# Patient Record
Sex: FEMALE | Race: WHITE | Employment: OTHER | URBAN - METROPOLITAN AREA
[De-identification: names, ages, dates, MRNs, and addresses within clinical notes are randomized per-mention and may not be internally consistent; named-entity substitution may affect disease eponyms.]

---

## 2017-01-11 LAB
A-G RATIO,AGRAT: 1.2 RATIO (ref 1.1–2.6)
ALBUMIN SERPL-MCNC: 4 G/DL (ref 3.5–5)
ALP SERPL-CCNC: 58 U/L (ref 40–120)
ALT SERPL-CCNC: 25 U/L (ref 5–40)
ANION GAP SERPL CALC-SCNC: 15 MMOL/L
AST SERPL W P-5'-P-CCNC: 28 U/L (ref 10–37)
BASOPHILS ABS-DIF,2030: 0 K/UL (ref 0–0.2)
BASOPHILS C MAN (DIFF), 1068: 1 % (ref 0–2)
BILIRUB SERPL-MCNC: 0.3 MG/DL (ref 0.2–1.2)
BILIRUBIN, DIRECT,CBIL: <0.2 MG/DL (ref 0–0.3)
BUN SERPL-MCNC: 23 MG/DL (ref 6–22)
CALCIUM SERPL-MCNC: 9.1 MG/DL (ref 8.4–10.5)
CHLORIDE SERPL-SCNC: 99 MMOL/L (ref 98–110)
CHOLEST SERPL-MCNC: 218 MG/DL (ref 110–200)
CO2 SERPL-SCNC: 24 MMOL/L (ref 20–32)
CREAT SERPL-MCNC: 1.5 MG/DL (ref 0.8–1.4)
EOS ABS-DIF,2069: 0 K/UL (ref 0–0.5)
EOSINOPHILS C MAN (DIFF), 1067: 1 % (ref 0–6)
ERYTHROCYTE [DISTWIDTH] IN BLOOD BY AUTOMATED COUNT: 12.5 % (ref 10–16)
GFRAA, 66117: 41.5
GFRNA, 66118: 34.3
GLOBULIN,GLOB: 3.4 G/DL (ref 2–4)
GLUCOSE SERPL-MCNC: 149 MG/DL (ref 65–99)
HCT VFR BLD AUTO: 35 % (ref 35.1–48)
HDLC SERPL-MCNC: 53 MG/DL (ref 40–59)
HGB BLD-MCNC: 12.2 G/DL (ref 11.7–16)
LDLC SERPL CALC-MCNC: 115 MG/DL (ref 50–99)
LYMPHOCYTES C MAN (DIFF), 1065: 52 % (ref 27–45)
LYMPHS ABS-DIF,2105: 2.3 K/UL (ref 1–4.8)
MAGNESIUM SERPL-MCNC: 1.8 MG/DL (ref 1.6–2.5)
MCH RBC QN AUTO: 31 PG (ref 26–34)
MCHC RBC AUTO-ENTMCNC: 35 G/DL (ref 32–36)
MCV RBC AUTO: 90 FL (ref 80–95)
MONOCYTES ABS-DIF,2141: 0.2 K/UL (ref 0.1–0.9)
MONOCYTES C MAN (DIFF), 1066: 4 % (ref 3–9)
NEUTROPHILS ABS,2156: 1.9 K/UL (ref 1.8–7.7)
NEUTROPHILS C MAN (DIFF), 1064: 42 % (ref 40–75)
NORMOCHROMIC CELLAVISION, 1078: ABNORMAL
NORMOCYTIC CELLAVISION, 1079: ABNORMAL
PLATELET # BLD AUTO: 181 K/UL (ref 140–440)
PMV BLD AUTO: 10.9 FL (ref 6–10.8)
POTASSIUM SERPL-SCNC: 5.2 MMOL/L (ref 3.5–5.5)
PROGRAF LEVEL (FK506), 10002: 4.1 NG/ML
PROT SERPL-MCNC: 7.4 G/DL (ref 6.2–8.1)
RBC # BLD AUTO: 3.9 M/UL (ref 3.8–5.2)
SMEAR EVAL, 1131: ABNORMAL
SODIUM SERPL-SCNC: 138 MMOL/L (ref 133–145)
TRIGL SERPL-MCNC: 251 MG/DL (ref 40–149)
VLDLC SERPL CALC-MCNC: 50 MG/DL (ref 8–30)
WBC # BLD AUTO: 4.9 K/UL (ref 4–11)

## 2017-01-18 ENCOUNTER — OFFICE VISIT (OUTPATIENT)
Dept: HEMATOLOGY | Age: 62
End: 2017-01-18

## 2017-01-18 VITALS
OXYGEN SATURATION: 98 % | DIASTOLIC BLOOD PRESSURE: 65 MMHG | HEIGHT: 65 IN | RESPIRATION RATE: 16 BRPM | WEIGHT: 175 LBS | BODY MASS INDEX: 29.16 KG/M2 | TEMPERATURE: 99.5 F | SYSTOLIC BLOOD PRESSURE: 121 MMHG | HEART RATE: 105 BPM

## 2017-01-18 DIAGNOSIS — T86.40 COMPLICATION OF TRANSPLANTED LIVER, UNSPECIFIED COMPLICATION (HCC): ICD-10-CM

## 2017-01-18 DIAGNOSIS — K76.0 FATTY (CHANGE OF) LIVER, NOT ELSEWHERE CLASSIFIED: ICD-10-CM

## 2017-01-18 DIAGNOSIS — Z94.4 LIVER REPLACED BY TRANSPLANT (HCC): ICD-10-CM

## 2017-01-18 DIAGNOSIS — Z94.4 S/P LIVER TRANSPLANT (HCC): ICD-10-CM

## 2017-01-18 RX ORDER — TACROLIMUS 1 MG/1
CAPSULE ORAL
Qty: 420 CAP | Refills: 6 | Status: SHIPPED | OUTPATIENT
Start: 2017-01-18 | End: 2017-06-09 | Stop reason: ALTCHOICE

## 2017-01-18 NOTE — MR AVS SNAPSHOT
Visit Information Date & Time Provider Department Dept. Phone Encounter #  
 1/18/2017 10:30 AM Lexi Auguste NP [facility] [place] [facility] [address] [facility] [id number] Follow-up Instructions Return in about 6 months (around 7/18/2017). Your Appointments 7/26/2017 10:30 AM  
Follow Up with Berhane Dunn NP [facility] [place] [facility] [place] ([facility]) Appt Note: s/p transplant; s/p transplant [facility] [address] [facility] [address]  
  
   
 [facility] [address] [facility] Upcoming Health Maintenance Date Due Hepatitis C Screening 1955 FOOT EXAM Q1 9/24/1963 MICROALBUMIN Q1 9/24/1963 EYE EXAM RETINAL OR DILATED Q1 9/24/1963 DTaP/Tdap/Td series (1 - Tdap) 9/24/1974 FOBT Q 1 YEAR AGE 50-75 9/24/2003 Pneumococcal 19-64 Highest Risk (2 of 3 - PCV13) 1/1/2013 ZOSTER VACCINE AGE 60> 9/24/2013 PAP AKA CERVICAL CYTOLOGY 10/18/2014 INFLUENZA AGE 9 TO ADULT 8/1/2016 HEMOGLOBIN A1C Q6M 12/15/2016 LIPID PANEL Q1 1/11/2018 BREAST CANCER SCRN MAMMOGRAM 11/22/2018 Allergies as of 1/18/2017  Review Complete On: 1/18/2017 By: Tania Gibbons Severity Noted Reaction Type Reactions Percocet [Oxycodone-acetaminophen]  01/27/2013    Shortness of Breath, Itching, Other (comments) \"Burning skin\" Current Immunizations  Reviewed on 4/11/2013 Name Date Influenza Vaccine 1/21/2013 Pneumococcal Vaccine (Unspecified Type) 1/1/2012 Not reviewed this visit You Were Diagnosed With   
  
 Codes Comments Fatty (change of) liver, not elsewhere classified     ICD-10-CM: K76.0 ICD-9-CM: 571.8 Liver replaced by transplant [facility])     ICD-10-CM: Z94.4 ICD-9-CM: V42.7 Complication of transplanted liver, unspecified complication ([facility] [address]     HRF-63-BL: T86.40 ICD-9-CM: 057.97 S/P liver transplant ([facility]     ICD-10-CM: Z94.4 ICD-9-CM: V42.7 Vitals BP Pulse Temp Resp Height(growth percentile) 121/65 (BP 1 Location: Left arm, BP Patient Position: At rest) (!) 105 99.5 °F (37.5 °C) (Tympanic) 16 5' 5\" (1.651 m) Weight(growth percentile) SpO2 BMI OB Status Smoking Status 175 lb (79.4 kg) 98% 29.12 kg/m2 Postmenopausal Never Smoker Vitals History BMI and BSA Data Body Mass Index Body Surface Area  
 29.12 kg/m 2 1.91 m 2 Preferred Pharmacy Pharmacy Name Phone [id number] Susana He, [place] [phone] Your Updated Medication List  
  
   
This list is accurate as of: 17 11:24 AM.  Always use your most recent med list.  
  
  
  
  
 alendronate 70 mg tablet Commonly known as:  FOSAMAX TAKE 1 TABLET BY MOUTH EVERY THURSDAY. furosemide 40 mg tablet Commonly known as:  LASIX TAKE ONE TABLET BY MOUTH EVERY DAY  
  
 HYDROmorphone 2 mg tablet Commonly known as:  DILAUDID Take 0.5 Tabs by mouth every four (4) hours as needed for Pain.  
  
 metFORMIN 500 mg tablet Commonly known as:  GLUCOPHAGE Take 750 mg by mouth two (2) times daily (with meals). metoprolol tartrate 25 mg tablet Commonly known as:  LOPRESSOR  
TAKE 1 TABLET (25 MG) BY ORAL ROUTE 2 TIMES PER DAY (GENERIC LOPRESSOR)  
  
 pantoprazole 40 mg tablet Commonly known as:  PROTONIX Take 1 Tab by mouth daily. tacrolimus 1 mg capsule Commonly known as:  PROGRAF  
7 mg po bid VYVANSE PO Take  by mouth. Prescriptions Printed Refills  
 tacrolimus (PROGRAF) 1 mg capsule 6 Si mg po bid Class: Print Follow-up Instructions Return in about 6 months (around 2017). To-Do List   
 2017 Lab:  CBC WITH AUTOMATED DIFF   
  
 2017 Lab:  HEPATIC FUNCTION PANEL   
  
 2017 Lab:  LIPID PANEL   
  
 2017 Lab:  MAGNESIUM   
  
 2017   Lab:  METABOLIC PANEL, BASIC   
 01/18/2017 Lab:  TACROLIMUS, WHOLE BLOOD Introducing [facility]! [facility] introduces [employer] patient portal. Now you can access parts of your medical record, email your doctor's office, and request medication refills online. 1. In your internet browser, go to [email or web address] [email or web address] 2. Click on the First Time User? Click Here link in the Sign In box. You will see the New Member Sign Up page. 3. Enter your L[employer]izer Access Code exactly as it appears below. You will not need to use this code after youve completed the sign-up process. If you do not sign up before the expiration date, you must request a new code. · [employer] Access Code: [email or web address] Expires: 4/18/2017 11:06 AM 
 
4. Enter the last four digits of your Social Security Number (xxxx) and Date of Birth (mm/dd/yyyy) as indicated and click Submit. You will be taken to the next sign-up page. 5. Create a Locomizer ID. This will be your L[employer]izer login ID and cannot be changed, so think of one that is secure and easy to remember. 6. Create a L[employer]izer password. You can change your password at any time. 7. Enter your Password Reset Question and Answer. This can be used at a later time if you forget your password. 8. Enter your e-mail address. You will receive e-mail notification when new information is available in [address] 9. Click Sign Up. You can now view and download portions of your medical record. 10. Click the Download Summary menu link to download a portable copy of your medical information. If you have questions, please visit the Frequently Asked Questions section of the L[employer]izer website. Remember, Locomizer is NOT to be used for urgent needs. For medical emergencies, dial 911. Now available from your iPhone and Android! Please provide this summary of care documentation to your next provider. Your primary care clinician is listed as Kavita Lamas.  If you have any questions after today's visit, please call [phone].

## 2017-01-18 NOTE — PROGRESS NOTES
[facility]    Chauncey Soto MD April G SILVA Suh NP        at [address] [facility], [address] [address]  [address].     [phone]     FAX: [phone]    at [address], [address] [facility], [address]     [phone]     FAX: [phone]       PCP. MD MAREK Somers      Problem List  Date Reviewed: 7/20/2016          Codes Class Noted    Liver replaced by transplant [facility]) ICD-10-CM: Z94.4  ICD-9-CM: V42.7  11/13/2013        Encounter for long-term (current) use of other medications ICD-10-CM: Z79.899  ICD-9-CM: V58.69  10/62/4345        Complications of transplanted liver ICD-10-CM: T86.40  ICD-9-CM: 996.82  11/13/2013        Back pain, lumbosacral ICD-10-CM: M54.5, M54.89  ICD-9-CM: 724.2, 724.6  1/27/2013        S/P liver transplant ([facility] [address] ICD-10-CM: Z94.4  ICD-9-CM: V42.7  5/28/2012    Overview Addendum 6/25/2013  9:48 AM by Chauncey Soto MD     4/2013             Diabetes mellitus ([facility] [address] ICD-10-CM: E11.9  ICD-9-CM: 250.00  5/28/2012        Colon polyps ICD-10-CM: K63.5  ICD-9-CM: 211.3  5/28/2012        Breast cancer ([facility] [address] ICD-10-CM: C50.919  ICD-9-CM: 174.9  5/28/2012    Overview Signed 5/28/2012  7:00 AM by Chauncey Soto MD     Masectomy, chemotherapy,XRT. 1996  Tamoxifen [id number]               H/O shoulder surgery ICD-10-CM: Z98.890  ICD-9-CM: V45.89  5/28/2012    Overview Signed 5/28/2012  7:07 AM by Chauncey Soto MD     12/2011                   Niharika Bob returns to the [address] for management of liver allograft function, to adjust immune suppression. The active problem list, all pertinent past medical history, medications, liver histology, endoscopic studies, radiologic findings and laboratory findings related to the liver disorder were reviewed with the patient.     The patient underwent liver [facility] [place] in 4/2013. The patient is currently receiving tacrolimus for immune suppression. Ultrasound of the liver was performed in 11/2013. This suggests that the liver is normal.      The most recent laboratory studies indicate that the liver transaminases are normal, alkaline phosphatase is normal, tests of hepatic synthetic and metabolic function are normal, and the platelet count is normal.    The patient feels well and voiced no complaints today. Her creatinine increased to 2 last year after beginning Trulicity. She discontinued this medication and her creatinine returned to baseline. The back pain she had has improved markedly following the treatments to her back. She is attempting to lose weight, but has not had much success. The patient has limitations in functional activities secondary to these symptoms. The patient has not experienced fevers, chills, shortness of breath, swelling of the abdomen, swelling of the lower extremities. ALLERGIES:  Allergies   Allergen Reactions    Percocet [Oxycodone-Acetaminophen] Shortness of Breath, Itching and Other (comments)     \"Burning skin\"     Current Outpatient Prescriptions   Medication Sig Dispense Refill    metoprolol tartrate (LOPRESSOR) 25 mg tablet TAKE 1 TABLET (25 MG) BY ORAL ROUTE 2 TIMES PER DAY (GENERIC LOPRESSOR) 60 Tab 10    furosemide (LASIX) 40 mg tablet TAKE ONE TABLET BY MOUTH EVERY DAY 90 Tab 3    alendronate (FOSAMAX) 70 mg tablet TAKE 1 TABLET BY MOUTH EVERY THURSDAY. 4 Tab 11    tacrolimus (PROGRAF) 1 mg capsule 7 mg po bid 420 Cap 6    metFORMIN (GLUCOPHAGE) 500 mg tablet Take 750 mg by mouth two (2) times daily (with meals).  HYDROmorphone (DILAUDID) 2 mg tablet Take 0.5 Tabs by mouth every four (4) hours as needed for Pain. 20 Tab 0    pantoprazole (PROTONIX) 40 mg tablet Take 1 Tab by mouth daily.  90 Tab 3       SYSTEM REVIEW NOT RELATED TO LIVER DISEASE OR REVIEWED ABOVE:  Constitution systems: Negative for fever, chills, weight gain, weight loss. Eyes: Negative for visual changes. ENT: Negative for sore throat, painful swallowing. Respiratory: Negative for cough, hemoptysis, SOB. Cardiology: Negative for chest pain, palpitations. GI:  Negative for constipation or diarrhea. : Negative for urinary frequency, dysuria, hematuria, nocturia. Skin: Negative for rash. Hematology: Negative for easy bruising, blood clots. Musculo-skelatal: Negative for muscle pain, weakness. Positive for back pain. Neurologic: Negative for headaches, dizziness, vertigo, memory problems not related to HE. Psychology: Negative for anxiety, depression. FAMILY/SOCIAL HISTORY:  The patient is . There are 2 children and 2 grandchildren. The patient has never used tobacco products. The patient has never consumed significant amounts of alcohol. The patient currently works full time as for [employer] and as an  for [employer].    PHYSICAL EXAMINATION:    Visit Vitals    /67 (BP 1 Location: Left arm, BP Patient Position: Sitting)    Pulse (!) 105    Temp 99.5 °F (37.5 °C) (Tympanic)    Resp 16    Ht 5' 5\" (1.651 m)    Wt 175 lb (79.4 kg)    SpO2 98%    BMI 29.12 kg/m2       General: Appears healthy. No acute distress. Eyes: Sclera anicteric. ENT: No oral lesions. Thyroid normal.  Nodes: No adenopathy. Skin: No spider angiomata. No jaundice. No palmar erythema. Respiratory: Lungs clear to auscultation. Cardiovascular: Regular heart rate. No murmurs. No JVD. Abdomen:   Well healed liver transplant incision. Soft non-tender. Liver size normal to percussion/palpation. Spleen not palpable. No obvious ascites. Extremities: No lower edema. No muscle wasting. No gross arthritic changes. Neurologic:  Alert and oriented. Cranial nerves grossly intact. No asterixsis.     LAB STUDIES:    Liver [facility] Ref Rng & Units 1/11/2017 10/14/2016   WBC 4.0 - 11.0 K/uL 4.9 5.6   ANC 1.8 - 7.7 K/uL  1.7 (L)   HGB 11.7 - 16.0 g/dL 12.2 12.3   HGB 12.0 - 16.0 G/DL     HGB (iSTAT) 12.0 - 16.0 G/DL      - 440 K/uL 181 187   INR 0.8 - 1.2       AST 10 - 37 U/L 28 21   ALT 5 - 40 U/L 25 18   Alk Phos 40 - 120 U/L 58 54   Bili, Total 0.2 - 1.2 mg/dL 0.3 0.3   Bili, Direct 0.0 - 0.3 mg/dL <0.2 <0.2   Albumin 3.5 - 5.0 g/dL 4.0 4.1   BUN 6 - 22 mg/dL 23 (H) 36 (H)   BUN (iSTAT) 7 - 18 MG/DL     Creat 0.8 - 1.4 mg/dL 1.5 (H) 1.6 (H)   Na 133 - 145 mmol/L 138 139   K 3.5 - 5.5 mmol/L 5.2 5.2   Cl 98 - 110 mmol/L 99 101   CO2 20 - 32 mmol/L 24 24   Glucose 65 - 99 mg/dL 149 (H) 133 (H)   Magnesium 1.6 - 2.5 mg/dL 1.8 2.0   Ammonia 11 - 32 UMOL/L       Liver [facility] Ref Rng & Units 7/14/2016 7/16/2015   WBC 4.0 - 11.0 K/uL 4.4 5.4   ANC 1.8 - 7.7 K/uL     HGB 11.7 - 16.0 g/dL 11.4 (L) 11.7   HGB 12.0 - 16.0 G/DL     HGB (iSTAT) 12.0 - 16.0 G/DL      - 440 K/uL 189 165   INR 0.8 - 1.2       AST 10 - 37 U/L 19 19   ALT 5 - 40 U/L 17 12   Alk Phos 40 - 120 U/L 54 57   Bili, Total 0.2 - 1.2 mg/dL 0.2 0.3   Bili, Direct 0.0 - 0.3 mg/dL <0.2 <0.2   Albumin 3.5 - 5.0 g/dL 3.8 3.7   BUN 6 - 22 mg/dL 32 (H) 31 (H)   BUN (iSTAT) 7 - 18 MG/DL     Creat 0.8 - 1.4 mg/dL 1.7 (H) 1.3   Na 133 - 145 mmol/L 140 140   K 3.5 - 5.5 mmol/L 4.7 4.8   Cl 98 - 110 mmol/L 102 105   CO2 20 - 32 mmol/L 24 22   Glucose 65 - 99 mg/dL 124 (H) 147 (H)   Magnesium 1.6 - 2.5 mg/dL 1.9 2.1   Ammonia 11 - 32 UMOL/L       1/11/17: TAC - 4.1     SEROLOGIES:  2/2012. HAV total negative, HBsAntigen negative, anti-HBcore negative, anti-HBsurface negative, anti-HCV negative, Ferritin 20, NEVA negative, ASMA negative, AMA negative, ceruloplsmin 34, alpha-1-antitrypsin 195, A1AT phenotype MM.  2/2013.   Ferritin 434, ASMA weak positive, CMV IgG positive, EBV IgG positive, anti-HIV negative, HBA1C 5.1    LIVER HISTOLOGY:  Not available or performed    ENDOSCOPIC PROCEDURES:  1/2012. EGD by Dr Luzmaria Barron. Esophageal varices. RADIOLOGY:  1/2012. CT scan abdomen with and without IV contrast.  Changes consistent with cirrhosis. No liver mass lesions. No dilated bile ducts. No bile duct strictures. Varices. Generalized ascites. 07/2012:  Ultrasound of the liver. Echogenic consistent with chronic liver disease, cirrhosis. No liver mass lesions. No ascites  11/2012: Ultrasound of the liver. Echogenic consistent with chronic liver disease, cirrhosis. No liver mass lesions. Small amount ascites present. 1/2013. Ultrasound of liver. Echogenic consistent with cirrhosis. No liver mass lesions. No dilated bile ducts. Mild ascites. 11/2013. Ultrasound of liver. Normal appearing liver. No liver mass lesions. 12/2013:  MRI of abdomen. Questionable small focal stenosis of distal common hepatic duct. Focal stenosis in mid-portal vein. OTHER TESTING:.   2/2013. ETOH negative. 08/2013:  DEXA scan - osteoporosis     ASSESSMENT AND PLAN:    1. Liver transplant for Crytopgenic cirrhosis. 2. The liver transaminases are normal, alkaline phosphate is normal, liver synthetic and metabolic function is normal and platelet count is normal.      3. The patient is receiving immune suppression with tacrolimus which is being well tolerated with only mild side effects. Currently taking tacrolimus to 2 mg in AM and 1 mg in PM.  Tacrolimus level is now 4.1.      5. Cellcept was discontinued. 6. Chronic renal insufficiency from immune suppression. Creatinine 1.5. Stable for now. 7. The patient was counseled regarding alcohol use. 8. Osteoporosis is being treated with fosmax, calcium, vitamin D. She had a DEXA scan in October 2015 that continued to show osteoporosis, but increased BMD.     9. Patient should receive annual flu-vaccine from their primary care provider.   Live vaccines should not be administered. She states that she received her flu shot in September 2016. 10. Screening for skin cancer due to chronic immunosuppression. She was seen by Dr. Lobito Rodriguez in April 2016. 12. Laboratory studies should be performed every 3 months to assess liver graft function and blood levels of immune suppression. [facility] [address] 6 months. 15. Dr. Keron Caldwell was available during this visit.        Lexi Dennis, NP  Liver [place] of [address] [place] [address], [address] [place] [address] Street [address]  [phone]

## 2017-01-20 ENCOUNTER — PATIENT OUTREACH (OUTPATIENT)
Dept: HEMATOLOGY | Age: 62
End: 2017-01-20

## 2017-01-20 DIAGNOSIS — Z94.4 LIVER REPLACED BY TRANSPLANT (HCC): Primary | ICD-10-CM

## 2017-01-20 DIAGNOSIS — Z94.4 LIVER REPLACED BY TRANSPLANT (HCC): ICD-10-CM

## 2017-04-25 ENCOUNTER — NURSE NAVIGATOR (OUTPATIENT)
Dept: HEMATOLOGY | Age: 62
End: 2017-04-25

## 2017-05-16 ENCOUNTER — PATIENT OUTREACH (OUTPATIENT)
Dept: HEMATOLOGY | Age: 62
End: 2017-05-16

## 2017-05-16 DIAGNOSIS — Z94.4 S/P LIVER TRANSPLANT (HCC): Primary | ICD-10-CM

## 2017-05-16 NOTE — PROGRESS NOTES
Reviewed progress note from Dr. Wilfredo Barriga, Nephrologist. Asked that patient repeat labs tomorrow to ensure creatinine has decreased back to baseline. Order placed and faxed to [facility] ([phone]).

## 2017-05-19 ENCOUNTER — PATIENT OUTREACH (OUTPATIENT)
Dept: HEMATOLOGY | Age: 62
End: 2017-05-19

## 2017-05-19 NOTE — PROGRESS NOTES
Reviewed most recent labs with patient. Noted creatinine of 2. Patient reports she has an upcoming appointment with Nephrology. Asked that patient contact NN with an update once patient has been evaluated by nephrology.

## 2017-05-24 ENCOUNTER — PATIENT OUTREACH (OUTPATIENT)
Dept: HEMATOLOGY | Age: 62
End: 2017-05-24

## 2017-05-24 NOTE — PROGRESS NOTES
Reviewed repeat lab results with Dr. Carter Sweet. Creatinine remains at 2. Dr. Carter Sweet recommends d/c'ing Tacrolimus and switching to Sirolimus 1 mg BID. Phone call placed to patient. Reviewed the above information. Patient expressed hesitation to switch medications. She has been taking Lasix 40 mg daily for LE edema. Patient asked if she could stop medication and see if creatinine improves. Advised patient to decrease dosing to every other day for now. Asked that she contact NN in 1 week to advise if edema worsened on decreased dose. If not, will advise patient to d/c medication and repeat labs to determine if creatinine returns to baseline. Patient verbalized understanding.

## 2017-06-09 ENCOUNTER — PATIENT OUTREACH (OUTPATIENT)
Dept: HEMATOLOGY | Age: 62
End: 2017-06-09

## 2017-06-09 RX ORDER — SIROLIMUS 1 MG/1
2 TABLET, FILM COATED ORAL DAILY
Qty: 60 TAB | Refills: 2 | Status: SHIPPED | OUTPATIENT
Start: 2017-06-09 | End: 2017-06-13

## 2017-06-09 RX ORDER — ALENDRONATE SODIUM 70 MG/1
70 TABLET ORAL
Qty: 12 TAB | Refills: 3 | Status: SHIPPED | OUTPATIENT
Start: 2017-06-09 | End: 2018-11-27

## 2017-06-13 ENCOUNTER — PATIENT OUTREACH (OUTPATIENT)
Dept: HEMATOLOGY | Age: 62
End: 2017-06-13

## 2017-06-13 RX ORDER — SIROLIMUS 1 MG/1
4 TABLET, FILM COATED ORAL DAILY
Qty: 360 TAB | Refills: 3 | Status: SHIPPED | OUTPATIENT
Start: 2017-06-13 | End: 2017-06-13

## 2017-06-13 RX ORDER — SIROLIMUS 1 MG/1
4 TABLET, FILM COATED ORAL DAILY
Qty: 360 TAB | Refills: 3 | Status: SHIPPED | OUTPATIENT
Start: 2017-06-13 | End: 2017-11-11

## 2017-06-15 NOTE — PROGRESS NOTES
Received phone call from patient who reports one month supply of Sirolimus is $190 at [facility] [place]. Patient reports she cannot afford medication. Phone call placed to insurance. Spoke with representative to inquire if mail-order will be cheaper. Representative states patient can obtain medication at a [employer] retail store; $10/30 day supply or $30/90 day supply. Representative states if patient obtains medication from [employer] [facility] mail order prices are $10/30 day supply or $20/90 day supply. Phone call returned to patient. Reviewed above information. Patient decided to receive medication via mail order. New prescription sent to [facility]. Patient advised to notify NN when she receives medication so lab can be obtained 1 week after switch.

## 2017-06-15 NOTE — PROGRESS NOTES
Patient reports edema developed when she tried stopping Lasix. Advised patient she will need to switch to Sirolimus for long-term kidney protection. Patient notified to alert NN when she has picked up medication from the pharmacy so labs can be arranged 1 week later.

## 2017-06-30 ENCOUNTER — NURSE NAVIGATOR (OUTPATIENT)
Dept: HEMATOLOGY | Age: 62
End: 2017-06-30

## 2017-08-02 ENCOUNTER — OFFICE VISIT (OUTPATIENT)
Dept: HEMATOLOGY | Age: 62
End: 2017-08-02

## 2017-08-02 VITALS
DIASTOLIC BLOOD PRESSURE: 50 MMHG | SYSTOLIC BLOOD PRESSURE: 116 MMHG | RESPIRATION RATE: 16 BRPM | WEIGHT: 175 LBS | HEIGHT: 65 IN | OXYGEN SATURATION: 98 % | TEMPERATURE: 98.5 F | BODY MASS INDEX: 29.16 KG/M2 | HEART RATE: 80 BPM

## 2017-08-02 DIAGNOSIS — Z94.4 S/P LIVER TRANSPLANT (HCC): Primary | ICD-10-CM

## 2017-08-02 NOTE — PROGRESS NOTES
[address] MD, [place], [place]       SILVA Bear PA-C Vance Bender, NP Pattricia Seidel, NP        at [address], [address] [employer]  [address].     [phone]     FAX: [phone]    at [address], [address] [place] [address], [address] [facility] [address]     [phone]     FAX: [phone]       PCP. MD TRINITY Vzaquez. Jackie Herman      Problem List  Date Reviewed: 8/2/2017          Codes Class Noted    Liver replaced by transplant [facility]) ICD-10-CM: Z94.4  ICD-9-CM: V42.7  11/13/2013        Encounter for long-term (current) use of other medications ICD-10-CM: Z79.899  ICD-9-CM: V58.69  00/61/8435        Complications of transplanted liver ICD-10-CM: T86.40  ICD-9-CM: 996.82  11/13/2013        Back pain, lumbosacral ICD-10-CM: M54.5  ICD-9-CM: 724.2, 724.6  1/27/2013        S[facility] ICD-10-CM: Z94.4  ICD-9-CM: V42.7  5/28/2012    Overview Addendum 6/25/2013  9:48 AM by Dilshad Josue MD     4/2013             Diabetes mellitus ([facility] ICD-10-CM: E11.9  ICD-9-CM: 250.00  5/28/2012        Colon polyps ICD-10-CM: K63.5  ICD-9-CM: 211.3  5/28/2012        Breast cancer ([facility] ICD-10-CM: C50.919  ICD-9-CM: 174.9  5/28/2012    Overview Signed 5/28/2012  7:00 AM by Dilshad Josue MD     Masectomy, chemotherapy,XRT. 1996  Tamoxifen [id number]               H/O shoulder surgery ICD-10-CM: Z98.890  ICD-9-CM: V45.89  5/28/2012    Overview Signed 5/28/2012  7:07 AM by Dilshad Josue MD     12/2011                   Onealemigdio [facility] of liver allograft function, to adjust immune suppression.   The active problem list, all pertinent past medical history, medications, liver histology, endoscopic studies, radiologic findings and laboratory findings related to the liver disorder were reviewed with the patient. The patient underwent liver transplantation at [place], [place] in 4/2013. The patient is currently receiving sirolimus for immune suppression. Ultrasound of the liver was performed in 11/2013. This suggests that the liver is normal.      The most recent laboratory studies indicate that the liver transaminases are normal, alkaline phosphatase is normal, tests of hepatic synthetic and metabolic function are normal, and the platelet count is normal.    The patient feels well and voiced no complaints today. Her creatinine increased to 2 last year after beginning Trulicity. She discontinued this medication and her creatinine returned to baseline. The back pain she had has improved markedly following the treatments to her back. She is attempting to lose weight, but has not had much success. The patient has limitations in functional activities secondary to these symptoms. The patient has not experienced fevers, chills, shortness of breath, swelling of the abdomen, swelling of the lower extremities. ALLERGIES:  Allergies   Allergen Reactions    Percocet [Oxycodone-Acetaminophen] Shortness of Breath, Itching and Other (comments)     \"Burning skin\"     Current Outpatient Prescriptions   Medication Sig Dispense Refill    sirolimus (RAPAMUNE) 1 mg tablet Take 4 Tabs by mouth daily. 360 Tab 3    alendronate (FOSAMAX) 70 mg tablet Take 1 Tab by mouth every seven (7) days. 12 Tab 3    LISDEXAMFETAMINE DIMESYLATE (VYVANSE PO) Take  by mouth.  metoprolol tartrate (LOPRESSOR) 25 mg tablet TAKE 1 TABLET (25 MG) BY ORAL ROUTE 2 TIMES PER DAY (GENERIC LOPRESSOR) 60 Tab 10    furosemide (LASIX) 40 mg tablet TAKE ONE TABLET BY MOUTH EVERY DAY 90 Tab 3    metFORMIN (GLUCOPHAGE) 500 mg tablet Take 750 mg by mouth two (2) times daily (with meals).  pantoprazole (PROTONIX) 40 mg tablet Take 1 Tab by mouth daily.  90 Tab 3    HYDROmorphone (DILAUDID) 2 mg tablet Take 0.5 Tabs by mouth every four (4) hours as needed for Pain. 20 Tab 0       SYSTEM REVIEW NOT RELATED TO LIVER DISEASE OR REVIEWED ABOVE:  Constitution systems: Negative for fever, chills, weight gain, weight loss. Eyes: Negative for visual changes. ENT: Negative for sore throat, painful swallowing. Respiratory: Negative for cough, hemoptysis, SOB. Cardiology: Negative for chest pain, palpitations. GI:  Negative for constipation or diarrhea. : Negative for urinary frequency, dysuria, hematuria, nocturia. Skin: Negative for rash. Hematology: Negative for easy bruising, blood clots. Musculo-skelatal: Negative for muscle pain, weakness. Positive for back pain. Neurologic: Negative for headaches, dizziness, vertigo, memory problems not related to HE. Psychology: Negative for anxiety, depression. FAMILY/SOCIAL HISTORY:  The patient is . There are 2 children and 2 grandchildren. The patient has never used tobacco products. The patient has never consumed significant amounts of alcohol. The patient currently works full time as for [employer] and as an  for [employer].    PHYSICAL EXAMINATION:    Visit Vitals    /50 (BP 1 Location: Left arm, BP Patient Position: Sitting)    Pulse 80    Temp 98.5 °F (36.9 °C) (Tympanic)    Resp 16    Ht 5' 5\" (1.651 m)    Wt 175 lb (79.4 kg)    SpO2 98%    BMI 29.12 kg/m2       General: Appears healthy. No acute distress. Eyes: Sclera anicteric. ENT: No oral lesions. Thyroid normal.  Nodes: No adenopathy. Skin: No spider angiomata. No jaundice. No palmar erythema. Respiratory: Lungs clear to auscultation. Cardiovascular: Regular heart rate. No murmurs. No JVD. Abdomen:   Well healed liver transplant incision. Soft non-tender. Liver size normal to percussion/palpation. Spleen not palpable. No obvious ascites. Extremities: No lower edema.   No muscle wasting. No gross arthritic changes. Neurologic:  Alert and oriented. Cranial nerves grossly intact. No asterixsis. LAB STUDIES:  From 04/2017:  AST/ ALT/ ALP/ T. BILI/ ALB:25/ 24/ 61/ 0.2/ 4.1  BUN/ CRT:  29/ 2.0    From 05/2017:  BUN/ CRT:  31/ 2.0    Form 06/2017:  AST/ ALT/ ALP/ T. BILI/ ALB:  21/ 16/ 60/ 0.2/ 4.0  BUN/ CRT:  28/ 1.5    From 07/2017:  AST/ ALT/ ALP/ T. BILI/ ALB: 26/ 21/ 70/ 0.2/ 3.8  BUN/ CRT:  27/ 1.6    Liver [facility] Ref Rng & Units 1/11/2017 10/14/2016   WBC 4.0 - 11.0 K/uL 4.9 5.6   ANC 1.8 - 7.7 K/uL  1.7 (L)   HGB 11.7 - 16.0 g/dL 12.2 12.3   HGB 12.0 - 16.0 G/DL     HGB (iSTAT) 12.0 - 16.0 G/DL      - 440 K/uL 181 187   INR 0.8 - 1.2       AST 10 - 37 U/L 28 21   ALT 5 - 40 U/L 25 18   Alk Phos 40 - 120 U/L 58 54   Bili, Total 0.2 - 1.2 mg/dL 0.3 0.3   Bili, Direct 0.0 - 0.3 mg/dL <0.2 <0.2   Albumin 3.5 - 5.0 g/dL 4.0 4.1   BUN 6 - 22 mg/dL 23 (H) 36 (H)   BUN (iSTAT) 7 - 18 MG/DL     Creat 0.8 - 1.4 mg/dL 1.5 (H) 1.6 (H)   Na 133 - 145 mmol/L 138 139   K 3.5 - 5.5 mmol/L 5.2 5.2   Cl 98 - 110 mmol/L 99 101   CO2 20 - 32 mmol/L 24 24   Glucose 65 - 99 mg/dL 149 (H) 133 (H)   Magnesium 1.6 - 2.5 mg/dL 1.8 2.0   Ammonia 11 - 32 UMOL/L        SEROLOGIES:  2/2012. HAV total negative, HBsAntigen negative, anti-HBcore negative, anti-HBsurface negative, anti-HCV negative, Ferritin 20, NEVA negative, ASMA negative, AMA negative, ceruloplsmin 34, alpha-1-antitrypsin 195, A1AT phenotype MM.  2/2013. Ferritin 434, ASMA weak positive, CMV IgG positive, EBV IgG positive, anti-HIV negative, HBA1C 5.1    LIVER HISTOLOGY:  Not available or performed    ENDOSCOPIC PROCEDURES:  1/2012. EGD by Dr Enrico Little. Esophageal varices. RADIOLOGY:  1/2012. CT scan abdomen with and without IV contrast.  Changes consistent with cirrhosis. No liver mass lesions. No dilated bile ducts. No bile duct strictures. Varices. Generalized ascites.   07/2012:  Ultrasound of the liver.  Echogenic consistent with chronic liver disease, cirrhosis. No liver mass lesions. No ascites  11/2012: Ultrasound of the liver. Echogenic consistent with chronic liver disease, cirrhosis. No liver mass lesions. Small amount ascites present. 1/2013. Ultrasound of liver. Echogenic consistent with cirrhosis. No liver mass lesions. No dilated bile ducts. Mild ascites. 11/2013. Ultrasound of liver. Normal appearing liver. No liver mass lesions. 12/2013:  MRI of abdomen. Questionable small focal stenosis of distal common hepatic duct. Focal stenosis in mid-portal vein. OTHER TESTING:.   2/2013. ETOH negative. 08/2013:  DEXA scan - osteoporosis     ASSESSMENT AND PLAN:  Liver transplant for Crytopgenic cirrhosis. The most recent laboratory studies indicate that the liver transaminases are normal, alkaline phosphatase is normal, tests of hepatic synthetic and metabolic function are normal, and the platelet count is normal.  She has standing orders for labs. The patient is receiving immune suppression with sirolimus which is being well tolerated with only mild side effects. Currently taking tacrolimus to 2 mg/day. There is no current sirolimus level. Cellcept was discontinued. Chronic renal insufficiency from immune suppression. Creatinine 1.6. Stable for now. The patient was counseled regarding alcohol use. Osteoporosis is being treated with fosmax. She had a DEXA scan in October 2015 that continued to show osteoporosis, but increased BMD.     Patient should receive annual flu-vaccine from their primary care provider. Live vaccines should not be administered. She states that she received her flu shot in September 2016. Screening for skin cancer due to chronic immunosuppression. She was seen by Dr. Kaylen Jason in April 2016. No longer seeing this physician.  She will find another dermatologist.     Laboratory studies should be performed every 3 months to assess liver graft function and blood levels of immune suppression. We will perform labs next month in order to get an accurate sirolimus level. [facility] [address] 3 months. Dr. Henrik Pemberton was available during this visit.        Marco A Parks NP  Liver [place] of [address] [place] [address], [address] [place] [address] [facility], [address] [place] [address] Street - [address]  [phone]

## 2017-08-02 NOTE — PROGRESS NOTES
Xenia Lloyd is a 59 y.o. female    Chief Complaint   Patient presents with    Follow Up Chronic Condition       1. Have you been to the ER, urgent care clinic or hospitalized since your last visit? NO.     2. Have you seen or consulted any other health care providers outside of the [address] [place] [address] since your last visit (Include any pap smears or colon screening)?  NO

## 2017-08-02 NOTE — MR AVS SNAPSHOT
Visit Information Date & Time Provider Department Dept. Phone Encounter #  
 8/2/2017 10:30 AM Frida Roach NP Liver [place] of [address] [place] [address] [id number] Follow-up Instructions Return in about 3 months (around 11/2/2017). Upcoming Health Maintenance Date Due Hepatitis C Screening 1955 FOOT EXAM Q1 9/24/1963 MICROALBUMIN Q1 9/24/1963 EYE EXAM RETINAL OR DILATED Q1 9/24/1963 DTaP/Tdap/Td series (1 - Tdap) 9/24/1974 FOBT Q 1 YEAR AGE 50-75 9/24/2003 Pneumococcal 19-64 Highest Risk (2 of 3 - PCV13) 1/1/2013 ZOSTER VACCINE AGE 60> 7/24/2013 PAP AKA CERVICAL CYTOLOGY 10/18/2014 HEMOGLOBIN A1C Q6M 12/15/2016 INFLUENZA AGE 9 TO ADULT 8/1/2017 LIPID PANEL Q1 1/11/2018 BREAST CANCER SCRN MAMMOGRAM 11/22/2018 Allergies as of 8/2/2017  Review Complete On: 1/18/2017 By: Obey Franklin NP Severity Noted Reaction Type Reactions Percocet [Oxycodone-acetaminophen]  01/27/2013    Shortness of Breath, Itching, Other (comments) \"Burning skin\" Current Immunizations  Reviewed on 4/11/2013 Name Date Influenza Vaccine 1/21/2013 Pneumococcal Vaccine (Unspecified Type) 1/1/2012 Not reviewed this visit Vitals BP Pulse Temp Resp Height(growth percentile) 116/50 (BP 1 Location: Left arm, BP Patient Position: Sitting) 80 98.5 °F (36.9 °C) (Tympanic) 16 5' 5\" (1.651 m) Weight(growth percentile) SpO2 BMI OB Status Smoking Status 175 lb (79.4 kg) 98% 29.12 kg/m2 Postmenopausal Never Smoker BMI and BSA Data Body Mass Index Body Surface Area  
 29.12 kg/m 2 1.91 m 2 Preferred Pharmacy Pharmacy Name Phone  [facility] [phone] Your Updated Medication List  
  
   
This list is accurate as of: 8/2/17 11:26 AM.  Always use your most recent med list.  
  
  
  
  
 alendronate 70 mg tablet Commonly known as:  FOSAMAX Take 1 Tab by mouth every seven (7) days. furosemide 40 mg tablet Commonly known as:  LASIX TAKE ONE TABLET BY MOUTH EVERY DAY  
  
 HYDROmorphone 2 mg tablet Commonly known as:  DILAUDID Take 0.5 Tabs by mouth every four (4) hours as needed for Pain.  
  
 metFORMIN 500 mg tablet Commonly known as:  GLUCOPHAGE Take 750 mg by mouth two (2) times daily (with meals). metoprolol tartrate 25 mg tablet Commonly known as:  LOPRESSOR  
TAKE 1 TABLET (25 MG) BY ORAL ROUTE 2 TIMES PER DAY (GENERIC LOPRESSOR)  
  
 pantoprazole 40 mg tablet Commonly known as:  PROTONIX Take 1 Tab by mouth daily. sirolimus 1 mg tablet Commonly known as:  RAPAMUNE Take 4 Tabs by mouth daily. VYVANSE PO Take  by mouth. Follow-up Instructions Return in about 3 months (around 11/2/2017). Introducing [facility] & HEALTH SERVICES! [facility] introduces [employer] patient portal. Now you can access parts of your medical record, email your doctor's office, and request medication refills online. 1. In your internet browser, go to [email or web address] 2. Click on the First Time User? Click Here link in the Sign In box. You will see the New Member Sign Up page. 3. Enter your [employer] Access Code exactly as it appears below. You will not need to use this code after youve completed the sign-up process. If you do not sign up before the expiration date, you must request a new code. · [employer] Access Code: [email or web address] Expires: 10/31/2017 11:26 AM 
 
4. Enter the last four digits of your Social Security Number (xxxx) and Date of Birth (mm/dd/yyyy) as indicated and click Submit. You will be taken to the next sign-up page. 5. Create a Upshott ID. This will be your [employer] login ID and cannot be changed, so think of one that is secure and easy to remember. 6. Create a U[employer]hott password. You can change your password at any time. 7. Enter your Password Reset Question and Answer. This can be used at a later time if you forget your password. 8. Enter your e-mail address. You will receive e-mail notification when new information is available in [address] 9. Click Sign Up. You can now view and download portions of your medical record. 10. Click the Download Summary menu link to download a portable copy of your medical information. If you have questions, please visit the Frequently Asked Questions section of the [employer] website. Remember, [employer] is NOT to be used for urgent needs. For medical emergencies, dial 911. Now available from your iPhone and Android! Please provide this summary of care documentation to your next provider. Your primary care clinician is listed as Chris Mancera. If you have any questions after today's visit, please call [phone].

## 2017-08-08 DIAGNOSIS — Z94.4 S/P LIVER TRANSPLANT (HCC): Primary | ICD-10-CM

## 2017-08-09 NOTE — PROGRESS NOTES
Spoke with patient and asked that patient repeat labs early next week to obtain Sirolimus trough. Placed new standing orders and faxed to [facility] ([phone]).

## 2017-08-25 ENCOUNTER — PATIENT OUTREACH (OUTPATIENT)
Dept: HEMATOLOGY | Age: 62
End: 2017-08-25

## 2017-08-25 DIAGNOSIS — Z94.4 LIVER REPLACED BY TRANSPLANT (HCC): Primary | ICD-10-CM

## 2017-08-25 NOTE — PROGRESS NOTES
Spoke with patient and reviewed most recent lab results. Notified to decrease Sirolimus dose from 2 mg daily to 1 mg daily. Asked that patient repeat labs on 9/1.

## 2017-09-12 ENCOUNTER — PATIENT OUTREACH (OUTPATIENT)
Dept: HEMATOLOGY | Age: 62
End: 2017-09-12

## 2017-10-13 NOTE — PROGRESS NOTES
Received VM from patient who reports she has an appointment with a dermatologist, Dr. Geovani Reis, on 10/18. She asked for records to be faxed to the office (F: [phone]). Phone call placed to patient. Received VM. Notified records have been faxed to derms office. Asked that patient repeat labs ~1 week prior to next appointment with KALIA Todd NP on 11/6.

## 2017-10-27 LAB
A-G RATIO,AGRAT: 1.1 RATIO (ref 1.1–2.6)
ALBUMIN SERPL-MCNC: 3.9 G/DL (ref 3.5–5)
ALP SERPL-CCNC: 144 U/L (ref 40–120)
ALT SERPL-CCNC: 156 U/L (ref 5–40)
ANION GAP SERPL CALC-SCNC: 15 MMOL/L
AST SERPL W P-5'-P-CCNC: 169 U/L (ref 10–37)
BILIRUB SERPL-MCNC: 0.3 MG/DL (ref 0.2–1.2)
BILIRUBIN, DIRECT,CBIL: <0.2 MG/DL (ref 0–0.3)
BUN SERPL-MCNC: 31 MG/DL (ref 6–22)
CALCIUM SERPL-MCNC: 9.2 MG/DL (ref 8.4–10.5)
CHLORIDE SERPL-SCNC: 100 MMOL/L (ref 98–110)
CO2 SERPL-SCNC: 24 MMOL/L (ref 20–32)
CREAT SERPL-MCNC: 1.6 MG/DL (ref 0.8–1.4)
ERYTHROCYTE [DISTWIDTH] IN BLOOD BY AUTOMATED COUNT: 13.8 % (ref 10–16)
GFRAA, 66117: 38.5
GFRNA, 66118: 31.8
GLOBULIN,GLOB: 3.6 G/DL (ref 2–4)
GLUCOSE SERPL-MCNC: 158 MG/DL (ref 70–99)
HCT VFR BLD AUTO: 35.4 % (ref 35.1–48)
HGB BLD-MCNC: 11.4 G/DL (ref 11.7–16)
MCH RBC QN AUTO: 28 PG (ref 26–34)
MCHC RBC AUTO-ENTMCNC: 32 G/DL (ref 32–36)
MCV RBC AUTO: 88 FL (ref 80–95)
PLATELET # BLD AUTO: 199 K/UL (ref 140–440)
PMV BLD AUTO: 10.6 FL (ref 6–10.8)
POTASSIUM SERPL-SCNC: 4.6 MMOL/L (ref 3.5–5.5)
PROT SERPL-MCNC: 7.5 G/DL (ref 6.2–8.1)
RAPAMYCIN (SIROLIMUS): 3.6 NG/ML (ref 3–20)
RBC # BLD AUTO: 4.01 M/UL (ref 3.8–5.2)
SODIUM SERPL-SCNC: 139 MMOL/L (ref 133–145)
WBC # BLD AUTO: 4 K/UL (ref 4–11)

## 2017-10-31 ENCOUNTER — PATIENT OUTREACH (OUTPATIENT)
Dept: HEMATOLOGY | Age: 62
End: 2017-10-31

## 2017-10-31 DIAGNOSIS — Z94.4 LIVER REPLACED BY TRANSPLANT (HCC): Primary | ICD-10-CM

## 2017-10-31 NOTE — PROGRESS NOTES
Discussed elevated liver enzymes with KALIA Troncoso NP. Phone call placed to patient. Reviewed labs. Patient notified to repeat labs on 11/2. Orders placed and faxed to [facility]. Will f/u with patient once results are available.

## 2017-11-03 LAB
A-G RATIO,AGRAT: 1.2 RATIO (ref 1.1–2.6)
ALBUMIN SERPL-MCNC: 4.1 G/DL (ref 3.5–5)
ALP SERPL-CCNC: 201 U/L (ref 40–120)
ALT SERPL-CCNC: 276 U/L (ref 5–40)
ANION GAP SERPL CALC-SCNC: 17 MMOL/L
AST SERPL W P-5'-P-CCNC: 315 U/L (ref 10–37)
BILIRUB SERPL-MCNC: 0.4 MG/DL (ref 0.2–1.2)
BUN SERPL-MCNC: 24 MG/DL (ref 6–22)
CALCIUM SERPL-MCNC: 9.7 MG/DL (ref 8.4–10.5)
CHLORIDE SERPL-SCNC: 94 MMOL/L (ref 98–110)
CO2 SERPL-SCNC: 25 MMOL/L (ref 20–32)
CREAT SERPL-MCNC: 1.5 MG/DL (ref 0.8–1.4)
GFRAA, 66117: 41.4
GFRNA, 66118: 34.2
GLOBULIN,GLOB: 3.5 G/DL (ref 2–4)
GLUCOSE SERPL-MCNC: 116 MG/DL (ref 70–99)
INR PPP: 0.9 (ref 0.89–1.29)
POTASSIUM SERPL-SCNC: 4.2 MMOL/L (ref 3.5–5.5)
PROT SERPL-MCNC: 7.6 G/DL (ref 6.2–8.1)
PROTHROMBIN TIME: 9.4 SEC (ref 9–13)
RAPAMYCIN (SIROLIMUS): 3.6 NG/ML (ref 3–20)
SODIUM SERPL-SCNC: 136 MMOL/L (ref 133–145)

## 2017-11-06 ENCOUNTER — OFFICE VISIT (OUTPATIENT)
Dept: HEMATOLOGY | Age: 62
End: 2017-11-06

## 2017-11-06 ENCOUNTER — HOSPITAL ENCOUNTER (OUTPATIENT)
Dept: LAB | Age: 62
Discharge: HOME | End: 2017-11-06
Payer: MEDICARE

## 2017-11-06 VITALS
SYSTOLIC BLOOD PRESSURE: 149 MMHG | HEART RATE: 83 BPM | RESPIRATION RATE: 16 BRPM | HEIGHT: 65 IN | TEMPERATURE: 96.8 F | WEIGHT: 178 LBS | DIASTOLIC BLOOD PRESSURE: 71 MMHG | BODY MASS INDEX: 29.66 KG/M2 | OXYGEN SATURATION: 97 %

## 2017-11-06 DIAGNOSIS — Z94.4 STATUS POST LIVER TRANSPLANT (HCC): Primary | ICD-10-CM

## 2017-11-06 DIAGNOSIS — R74.9 ABNORMAL SERUM ENZYME LEVEL: ICD-10-CM

## 2017-11-06 DIAGNOSIS — Z94.4 STATUS POST LIVER TRANSPLANT (HCC): ICD-10-CM

## 2017-11-06 LAB
ALBUMIN SERPL-MCNC: 3.4 G/DL (ref 3.4–5)
ALBUMIN/GLOB SERPL: 0.8 {RATIO} (ref 0.8–1.7)
ALP SERPL-CCNC: 349 U/L (ref 45–117)
ALT SERPL-CCNC: 349 U/L (ref 13–56)
ANION GAP SERPL CALC-SCNC: 13 MMOL/L (ref 3–18)
AST SERPL-CCNC: 325 U/L (ref 15–37)
BASOPHILS # BLD: 0 K/UL (ref 0–0.06)
BASOPHILS NFR BLD: 1 % (ref 0–2)
BILIRUB DIRECT SERPL-MCNC: 0.2 MG/DL (ref 0–0.2)
BILIRUB SERPL-MCNC: 0.5 MG/DL (ref 0.2–1)
BUN SERPL-MCNC: 19 MG/DL (ref 7–18)
BUN/CREAT SERPL: 11 (ref 12–20)
CALCIUM SERPL-MCNC: 8.7 MG/DL (ref 8.5–10.1)
CHLORIDE SERPL-SCNC: 102 MMOL/L (ref 100–108)
CO2 SERPL-SCNC: 27 MMOL/L (ref 21–32)
CREAT SERPL-MCNC: 1.75 MG/DL (ref 0.6–1.3)
DIFFERENTIAL METHOD BLD: ABNORMAL
EOSINOPHIL # BLD: 0.1 K/UL (ref 0–0.4)
EOSINOPHIL NFR BLD: 2 % (ref 0–5)
ERYTHROCYTE [DISTWIDTH] IN BLOOD BY AUTOMATED COUNT: 14.2 % (ref 11.6–14.5)
GLOBULIN SER CALC-MCNC: 4.5 G/DL (ref 2–4)
GLUCOSE SERPL-MCNC: 106 MG/DL (ref 74–99)
HCT VFR BLD AUTO: 34.9 % (ref 35–45)
HGB BLD-MCNC: 11.4 G/DL (ref 12–16)
INR PPP: 0.8 (ref 0.8–1.2)
LYMPHOCYTES # BLD: 2.3 K/UL (ref 0.9–3.6)
LYMPHOCYTES NFR BLD: 44 % (ref 21–52)
MCH RBC QN AUTO: 28.1 PG (ref 24–34)
MCHC RBC AUTO-ENTMCNC: 32.7 G/DL (ref 31–37)
MCV RBC AUTO: 86.2 FL (ref 74–97)
MONOCYTES # BLD: 0.4 K/UL (ref 0.05–1.2)
MONOCYTES NFR BLD: 8 % (ref 3–10)
NEUTS SEG # BLD: 2.4 K/UL (ref 1.8–8)
NEUTS SEG NFR BLD: 45 % (ref 40–73)
PLATELET # BLD AUTO: 248 K/UL (ref 135–420)
PMV BLD AUTO: 10.5 FL (ref 9.2–11.8)
POTASSIUM SERPL-SCNC: 3.7 MMOL/L (ref 3.5–5.5)
PROT SERPL-MCNC: 7.9 G/DL (ref 6.4–8.2)
PROTHROMBIN TIME: 10.6 SEC (ref 11.5–15.2)
RBC # BLD AUTO: 4.05 M/UL (ref 4.2–5.3)
SODIUM SERPL-SCNC: 142 MMOL/L (ref 136–145)
WBC # BLD AUTO: 5.2 K/UL (ref 4.6–13.2)

## 2017-11-06 PROCEDURE — 80048 BASIC METABOLIC PNL TOTAL CA: CPT | Performed by: NURSE PRACTITIONER

## 2017-11-06 PROCEDURE — 85610 PROTHROMBIN TIME: CPT | Performed by: NURSE PRACTITIONER

## 2017-11-06 PROCEDURE — 80076 HEPATIC FUNCTION PANEL: CPT | Performed by: NURSE PRACTITIONER

## 2017-11-06 PROCEDURE — 36415 COLL VENOUS BLD VENIPUNCTURE: CPT | Performed by: NURSE PRACTITIONER

## 2017-11-06 PROCEDURE — 85025 COMPLETE CBC W/AUTO DIFF WBC: CPT | Performed by: NURSE PRACTITIONER

## 2017-11-06 RX ORDER — GLIMEPIRIDE 4 MG/1
4 TABLET ORAL DAILY
COMMUNITY
Start: 2017-11-03 | End: 2021-03-15 | Stop reason: ALTCHOICE

## 2017-11-06 NOTE — PROGRESS NOTES
Magdaleno Newton is a 57 y.o. female    No chief complaint on file. 1. Have you been to the ER, urgent care clinic or hospitalized since your last visit? NO.     2. Have you seen or consulted any other health care providers outside of the [address] [place] [address] since your last visit (Include any pap smears or colon screening)?  NO  Learning Assessment 7/20/2016   PRIMARY LEARNER Patient   PRIMARY LANGUAGE ENGLISH   LEARNER PREFERENCE PRIMARY DEMONSTRATION   ANSWERED BY self   RELATIONSHIP SELF

## 2017-11-06 NOTE — MR AVS SNAPSHOT
Visit Information Date & Time Provider Department Dept. Phone Encounter #  
 11/6/2017 10:30 AM [place] [id number] Follow-up Instructions Return in about 2 weeks (around 11/20/2017). Your Appointments 11/7/2017  7:30 AM  
PROCEDURE with Ge Painting MD  
[address] [facility] Appt Note: LBX  
 [address], [address] [place] [facility]  
  
   
 [address], [address] [facility] Upcoming Health Maintenance Date Due Hepatitis C Screening 1955 FOOT EXAM Q1 9/24/1963 MICROALBUMIN Q1 9/24/1963 EYE EXAM RETINAL OR DILATED Q1 9/24/1963 DTaP/Tdap/Td series (1 - Tdap) 9/24/1974 FOBT Q 1 YEAR AGE 50-75 9/24/2003 Pneumococcal 19-64 Highest Risk (2 of 3 - PCV13) 1/1/2013 ZOSTER VACCINE AGE 60> 7/24/2013 PAP AKA CERVICAL CYTOLOGY 10/18/2014 HEMOGLOBIN A1C Q6M 12/15/2016 INFLUENZA AGE 9 TO ADULT 8/1/2017 LIPID PANEL Q1 1/11/2018 BREAST CANCER SCRN MAMMOGRAM 11/22/2018 Allergies as of 11/6/2017  Review Complete On: 11/6/2017 By: Rosales Edmonds Severity Noted Reaction Type Reactions Percocet [Oxycodone-acetaminophen]  01/27/2013    Shortness of Breath, Itching, Other (comments) \"Burning skin\" Current Immunizations  Reviewed on 4/11/2013 Name Date Influenza Vaccine 1/21/2013 Pneumococcal Vaccine (Unspecified Type) 1/1/2012 Not reviewed this visit You Were Diagnosed With   
  
 Codes Comments Status post liver transplant ([facility]    -  Primary ICD-10-CM: Z94.4 ICD-9-CM: V42.7 Abnormal serum enzyme level     ICD-10-CM: R74.9 ICD-9-CM: 790.5 Vitals BP Pulse Temp Resp Height(growth percentile) Weight(growth percentile)  149/71 (BP 1 Location: Left arm, BP Patient Position: Sitting) 83 96.8 °F (36 °C) (Tympanic) 16 5' 5\" (1.651 m) 178 lb (80.7 kg) SpO2 BMI OB Status Smoking Status 97% 29.62 kg/m2 Postmenopausal Never Smoker Vitals History BMI and BSA Data Body Mass Index Body Surface Area  
 29.62 kg/m 2 1.92 m 2 Preferred Pharmacy Pharmacy Name Phone [id number] [place] [phone] Your Updated Medication List  
  
   
This list is accurate as of: 11/6/17 11:29 AM.  Always use your most recent med list.  
  
  
  
  
 alendronate 70 mg tablet Commonly known as:  FOSAMAX Take 1 Tab by mouth every seven (7) days. furosemide 40 mg tablet Commonly known as:  LASIX TAKE ONE TABLET BY MOUTH EVERY DAY  
  
 glimepiride 4 mg tablet Commonly known as:  AMARYL Take 4 mg by mouth daily. HYDROmorphone 2 mg tablet Commonly known as:  DILAUDID Take 0.5 Tabs by mouth every four (4) hours as needed for Pain.  
  
 metoprolol tartrate 25 mg tablet Commonly known as:  LOPRESSOR  
TAKE 1 TABLET (25 MG) BY ORAL ROUTE 2 TIMES PER DAY (GENERIC LOPRESSOR)  
  
 pantoprazole 40 mg tablet Commonly known as:  PROTONIX Take 1 Tab by mouth daily. sirolimus 1 mg tablet Commonly known as:  RAPAMUNE Take 4 Tabs by mouth daily. VYVANSE PO Take  by mouth. Follow-up Instructions Return in about 2 weeks (around 11/20/2017). To-Do List   
 12/06/2017 Procedures:  BIOPSY LIVER Introducing [facility] & HEALTH SERVICES! O[facility] introduces [employer] patient portal. Now you can access parts of your medical record, email your doctor's office, and request medication refills online. 1. In your internet browser, go to [email or web address] 2. Click on the First Time User? Click Here link in the Sign In box. You will see the New Member Sign Up page. 3. Enter your [employer] Access Code exactly as it appears below.  You will not need to use this code after youve completed the sign-up process. If you do not sign up before the expiration date, you must request a new code. · [employer] Access Code: [email or web address] Expires: 2/4/2018 11:29 AM 
 
4. Enter the last four digits of your Social Security Number (xxxx) and Date of Birth (mm/dd/yyyy) as indicated and click Submit. You will be taken to the next sign-up page. 5. Create a [employer] ID. This will be your [employer] login ID and cannot be changed, so think of one that is secure and easy to remember. 6. Create a [employer] password. You can change your password at any time. 7. Enter your Password Reset Question and Answer. This can be used at a later time if you forget your password. 8. Enter your e-mail address. You will receive e-mail notification when new information is available in [address] 9. Click Sign Up. You can now view and download portions of your medical record. 10. Click the Download Summary menu link to download a portable copy of your medical information. If you have questions, please visit the Frequently Asked Questions section of the [employer] website. Remember, [employer] is NOT to be used for urgent needs. For medical emergencies, dial 911. Now available from your iPhone and Android! Please provide this summary of care documentation to your next provider. Your primary care clinician is listed as [facility]. If you have any questions after today's visit, please call [phone].

## 2017-11-06 NOTE — PROGRESS NOTES
[address] MD Don, [address], [place], [place]       SILVA Richards PA-C Jenita Colonel, [facility]   [place] [place], NP Florine Merlin, NP        at [address]     [address] [facility], [address] [address]     [phone]     FAX: [phone]    at [address], [address] [place] [address], [address]     [phone]     FAX: [phone]       PCP. MD TRINITY Burgess. Milton Victoria      Problem List  Date Reviewed: 11/6/2017          Codes Class Noted    Liver replaced by transplant [facility]) ICD-10-CM: Z94.4  ICD-9-CM: V42.7  11/13/2013        Encounter for long-term (current) use of other medications ICD-10-CM: Z79.899  ICD-9-CM: V58.69  57/10/7151        Complications of transplanted liver ICD-10-CM: T86.40  ICD-9-CM: 996.82  11/13/2013        Back pain, lumbosacral ICD-10-CM: M54.5  ICD-9-CM: 724.2, 724.6  1/27/2013        S/P liver transplant ([facility] ICD-10-CM: Z94.4  ICD-9-CM: V42.7  5/28/2012    Overview Addendum 6/25/2013  9:48 AM by Julian Jarquin MD     4/2013             Diabetes mellitus ([facility] ICD-10-CM: E11.9  ICD-9-CM: 250.00  5/28/2012        Colon polyps ICD-10-CM: K63.5  ICD-9-CM: 211.3  5/28/2012        Breast cancer ([facility] ICD-10-CM: C50.919  ICD-9-CM: 174.9  5/28/2012    Overview Signed 5/28/2012  7:00 AM by Julian Jarquin MD     Masectomy, chemotherapy,XRT. 1996  Tamoxifen [id number]               H/O shoulder surgery ICD-10-CM: Z98.890  ICD-9-CM: V45.89  5/28/2012    Overview Signed 5/28/2012  7:07 AM by Julian Jarquin MD     12/2011                   Jesusita Garner returns to the [address] for management of liver allograft function, to adjust immune suppression.   The active problem list, all pertinent past medical history, medications, liver histology, endoscopic studies, radiologic findings and laboratory findings related to the liver disorder were reviewed with the patient. The patient underwent liver transplantation at [place], [place] in 4/2013. The patient is currently receiving sirolimus for immune suppression. Ultrasound of the liver was performed in 11/2013. This suggests that the liver is normal.      The most recent laboratory studies indicate that the liver transaminases are grossly elevated in the 300's, alkaline phosphatase is elevated, tests of hepatic synthetic and metabolic function are normal, and the platelet count is normal.  BUN/CRT are elevated at 24/ 1.5    The patient feels well and voiced no complaints today. Her creatinine increased to 2 last year after beginning Trulicity. She discontinued this medication and her creatinine returned to baseline. The back pain she had has improved markedly following the treatments to her back. She is attempting to lose weight, but has not had much success. The patient has limitations in functional activities secondary to these symptoms. The patient has not experienced fevers, chills, shortness of breath, swelling of the abdomen, swelling of the lower extremities. ALLERGIES:  Allergies   Allergen Reactions    Percocet [Oxycodone-Acetaminophen] Shortness of Breath, Itching and Other (comments)     \"Burning skin\"     Current Outpatient Prescriptions   Medication Sig Dispense Refill    glimepiride (AMARYL) 4 mg tablet Take 4 mg by mouth daily.  sirolimus (RAPAMUNE) 1 mg tablet Take 4 Tabs by mouth daily. 360 Tab 3    alendronate (FOSAMAX) 70 mg tablet Take 1 Tab by mouth every seven (7) days. 12 Tab 3    LISDEXAMFETAMINE DIMESYLATE (VYVANSE PO) Take  by mouth.       metoprolol tartrate (LOPRESSOR) 25 mg tablet TAKE 1 TABLET (25 MG) BY ORAL ROUTE 2 TIMES PER DAY (GENERIC LOPRESSOR) 60 Tab 10    furosemide (LASIX) 40 mg tablet TAKE ONE TABLET BY MOUTH EVERY DAY 90 Tab 3    pantoprazole (PROTONIX) 40 mg tablet Take 1 Tab by mouth daily. 90 Tab 3    HYDROmorphone (DILAUDID) 2 mg tablet Take 0.5 Tabs by mouth every four (4) hours as needed for Pain. 20 Tab 0       SYSTEM REVIEW NOT RELATED TO LIVER DISEASE OR REVIEWED ABOVE:  Constitution systems: Negative for fever, chills, weight gain, weight loss. Eyes: Negative for visual changes. ENT: Negative for sore throat, painful swallowing. Respiratory: Negative for cough, hemoptysis, SOB. Cardiology: Negative for chest pain, palpitations. GI:  Negative for constipation or diarrhea. : Negative for urinary frequency, dysuria, hematuria, nocturia. Skin: Negative for rash. Hematology: Negative for easy bruising, blood clots. Musculo-skelatal: Negative for muscle pain, weakness. Positive for back pain. Neurologic: Negative for headaches, dizziness, vertigo, memory problems not related to HE. Psychology: Negative for anxiety, depression. FAMILY/SOCIAL HISTORY:  The patient is . There are 2 children and 2 grandchildren. The patient has never used tobacco products. The patient has never consumed significant amounts of alcohol. The patient currently works full time as for [employer] and as an  for [employer].    PHYSICAL EXAMINATION:    Visit Vitals    /71 (BP 1 Location: Left arm, BP Patient Position: Sitting)    Pulse 83    Temp 96.8 °F (36 °C) (Tympanic)    Resp 16    Ht 5' 5\" (1.651 m)    Wt 178 lb (80.7 kg)    SpO2 97%    BMI 29.62 kg/m2       General: Appears healthy. No acute distress. Eyes: Sclera anicteric. ENT: No oral lesions. Thyroid normal.  Nodes: No adenopathy. Skin: No spider angiomata. No jaundice. No palmar erythema. Respiratory: Lungs clear to auscultation. Cardiovascular: Regular heart rate. No murmurs. No JVD. Abdomen:   Well healed liver transplant incision. Soft non-tender. Liver size normal to percussion/palpation. Spleen not palpable.   No obvious ascites. Extremities: No lower edema. No muscle wasting. No gross arthritic changes. Neurologic:  Alert and oriented. Cranial nerves grossly intact. No asterixsis. LAB STUDIES:  From 04/2017:  AST/ ALT/ ALP/ T. BILI/ ALB:25/ 24/ 61/ 0.2/ 4.1  BUN/ CRT:  29/ 2.0    From 05/2017:  BUN/ CRT:  31/ 2.0    Form 06/2017:  AST/ ALT/ ALP/ T. BILI/ ALB:  21/ 16/ 60/ 0.2/ 4.0  BUN/ CRT:  28/ 1.5    From 07/2017:  AST/ ALT/ ALP/ T. BILI/ ALB: 26/ 21/ 70/ 0.2/ 3.8  BUN/ CRT:  27/ 1.6    Liver [facility] Latest Ref Rng & Units 11/2/2017 10/27/2017   WBC 4.0 - 11.0 K/uL  4.0   ANC 1.8 - 7.7 K/uL     HGB 11.7 - 16.0 g/dL  11.4 (L)    - 440 K/uL  199   INR 0.89 - 1.29 0.90    AST 10 - 37 U/L 315 (H) 169 (H)   ALT 5 - 40 U/L 276 (H) 156 (H)   Alk Phos 40 - 120 U/L 201 (H) 144 (H)   Bili, Total 0.2 - 1.2 mg/dL 0.4 0.3   Bili, Direct 0.0 - 0.3 mg/dL  <0.2   Albumin 3.5 - 5.0 g/dL 4.1 3.9   BUN 6 - 22 mg/dL 24 (H) 31 (H)   Creat 0.8 - 1.4 mg/dL 1.5 (H) 1.6 (H)   Na 133 - 145 mmol/L 136 139   K 3.5 - 5.5 mmol/L 4.2 4.6   Cl 98 - 110 mmol/L 94 (L) 100   CO2 20 - 32 mmol/L 25 24   Glucose 70 - 99 mg/dL 116 (H) 158 (H)   Magnesium 1.6 - 2.5 mg/dL       SEROLOGIES:  2/2012. HAV total negative, HBsAntigen negative, anti-HBcore negative, anti-HBsurface negative, anti-HCV negative, Ferritin 20, NEVA negative, ASMA negative, AMA negative, ceruloplsmin 34, alpha-1-antitrypsin 195, A1AT phenotype MM.  2/2013. Ferritin 434, ASMA weak positive, CMV IgG positive, EBV IgG positive, anti-HIV negative, HBA1C 5.1    LIVER HISTOLOGY:  Not available or performed    ENDOSCOPIC PROCEDURES:  1/2012. EGD by Dr [address] Street. Esophageal varices. RADIOLOGY:  1/2012. CT scan abdomen with and without IV contrast.  Changes consistent with cirrhosis. No liver mass lesions. No dilated bile ducts. No bile duct strictures. Varices. Generalized ascites. 07/2012:  Ultrasound of the liver.   Echogenic consistent with chronic liver disease, cirrhosis. No liver mass lesions. No ascites  11/2012: Ultrasound of the liver. Echogenic consistent with chronic liver disease, cirrhosis. No liver mass lesions. Small amount ascites present. 1/2013. Ultrasound of liver. Echogenic consistent with cirrhosis. No liver mass lesions. No dilated bile ducts. Mild ascites. 11/2013. Ultrasound of liver. Normal appearing liver. No liver mass lesions. 12/2013:  MRI of abdomen. Questionable small focal stenosis of distal common hepatic duct. Focal stenosis in mid-portal vein. OTHER TESTING:.   2/2013. ETOH negative. 08/2013:  DEXA scan - osteoporosis     ASSESSMENT AND PLAN:  Liver transplant for Crytopgenic cirrhosis. The most recent laboratory studies indicate that the liver transaminases are normal, alkaline phosphatase is normal, tests of hepatic synthetic and metabolic function are normal, and the platelet count is normal. Will perform laboratory testing to monitor liver function and degree of liver injury. This will include hepatic panel, a CBC w/ diff, a BMP, a PT/INR, and a CMV PCR QT. The patient is receiving immune suppression with sirolimus which is being well tolerated with only mild side effects. Currently taking  to 1 mg sirolimus bid. Sirolimus level on 11/02/2017 was 3.6. The patient reports that she felt poorly over the past week or so. Her liver enzymes are markedly elevated and have about doubled from 10/27/2017 to 11/02/2017. Liver biopsy was ordered today to be performed tomorrow to assess for acute allograph rejection. Chronic renal insufficiency from immune suppression. Creatinine 1.5. Stable for now. The patient was counseled regarding alcohol use. Osteoporosis is being treated with fosmax. She had a DEXA scan in October 2015 that continued to show osteoporosis, but increased BMD.     Patient should receive annual flu-vaccine from their primary care provider.   Live vaccines should not be administered. She states that she received her flu shot in September 2017. Screening for skin cancer due to chronic immunosuppression. She was seen by Dr. Trudy Caba in April 2016. No longer seeing this physician. She will find another dermatologist.     Laboratory studies should be performed every 4 weeks for now. [address] [address] in 2 weeks. Dr. Sintia Luna was available during this visit.        Lindsey Gan NP  Liver [place] of [address] [place] [address], [address] [place] [address] Marta Shepard, [address]  [phone]

## 2017-11-07 ENCOUNTER — HOSPITAL ENCOUNTER (OUTPATIENT)
Age: 62
Setting detail: OUTPATIENT SURGERY
Discharge: HOME | End: 2017-11-07
Attending: INTERNAL MEDICINE | Admitting: INTERNAL MEDICINE
Payer: MEDICARE

## 2017-11-07 ENCOUNTER — APPOINTMENT (OUTPATIENT)
Dept: ULTRASOUND IMAGING | Age: 62
End: 2017-11-07
Attending: INTERNAL MEDICINE
Payer: MEDICARE

## 2017-11-07 VITALS
OXYGEN SATURATION: 97 % | HEIGHT: 65 IN | SYSTOLIC BLOOD PRESSURE: 122 MMHG | WEIGHT: 178 LBS | TEMPERATURE: 97.5 F | BODY MASS INDEX: 29.66 KG/M2 | HEART RATE: 82 BPM | RESPIRATION RATE: 18 BRPM | DIASTOLIC BLOOD PRESSURE: 69 MMHG

## 2017-11-07 DIAGNOSIS — R79.89 ELEVATED LFTS: ICD-10-CM

## 2017-11-07 PROCEDURE — 76040000019: Performed by: INTERNAL MEDICINE

## 2017-11-07 PROCEDURE — 88313 SPECIAL STAINS GROUP 2: CPT | Performed by: INTERNAL MEDICINE

## 2017-11-07 PROCEDURE — 76942 ECHO GUIDE FOR BIOPSY: CPT

## 2017-11-07 PROCEDURE — 74011000250 HC RX REV CODE- 250: Performed by: INTERNAL MEDICINE

## 2017-11-07 PROCEDURE — 77030018836 HC SOL IRR NACL ICUM -A: Performed by: INTERNAL MEDICINE

## 2017-11-07 PROCEDURE — 88307 TISSUE EXAM BY PATHOLOGIST: CPT | Performed by: INTERNAL MEDICINE

## 2017-11-07 PROCEDURE — 77030013826 HC NDL BIOP MAXCOR BARD -B: Performed by: INTERNAL MEDICINE

## 2017-11-07 RX ORDER — SODIUM CHLORIDE 0.9 % (FLUSH) 0.9 %
5-10 SYRINGE (ML) INJECTION AS NEEDED
Status: DISCONTINUED | OUTPATIENT
Start: 2017-11-07 | End: 2017-11-07 | Stop reason: HOSPADM

## 2017-11-07 RX ORDER — HYDROMORPHONE HYDROCHLORIDE 1 MG/ML
1 INJECTION, SOLUTION INTRAMUSCULAR; INTRAVENOUS; SUBCUTANEOUS
Status: DISCONTINUED | OUTPATIENT
Start: 2017-11-07 | End: 2017-11-07 | Stop reason: HOSPADM

## 2017-11-07 RX ORDER — ONDANSETRON 2 MG/ML
4 INJECTION INTRAMUSCULAR; INTRAVENOUS
Status: DISCONTINUED | OUTPATIENT
Start: 2017-11-07 | End: 2017-11-07 | Stop reason: HOSPADM

## 2017-11-07 RX ORDER — LIDOCAINE HYDROCHLORIDE 10 MG/ML
10 INJECTION INFILTRATION; PERINEURAL ONCE
Status: COMPLETED | OUTPATIENT
Start: 2017-11-07 | End: 2017-11-07

## 2017-11-07 RX ORDER — SODIUM CHLORIDE 0.9 % (FLUSH) 0.9 %
5-10 SYRINGE (ML) INJECTION EVERY 8 HOURS
Status: DISCONTINUED | OUTPATIENT
Start: 2017-11-07 | End: 2017-11-07 | Stop reason: HOSPADM

## 2017-11-07 NOTE — IP AVS SNAPSHOT
Cristina Markham 
 
 
 [address] [facility] [address] 
[phone] Patient: Gonzalo Aponte MRN: [id number] USN:[id number] About your hospitalization You were admitted on:  November 7, 2017 You last received care in the:  [facility] ENDOSCOPY You were discharged on:  November 7, 2017 Why you were hospitalized Your primary diagnosis was:  Not on File Things You Need To Do (next 8 weeks) Follow up with Leigh Ann Houston MD  
  
Phone:  [phone] Where:  [facility], [address] Wednesday Nov 22, 2017 Follow Up with Leslee Couch MD at 10:15 AM  
Where: [address]) Discharge Orders None A check rah indicates which time of day the medication should be taken. My Medications TAKE these medications as instructed Instructions Each Dose to Equal  
 Morning Noon Evening Bedtime  
 alendronate 70 mg tablet Commonly known as:  FOSAMAX Your last dose was: Your next dose is: Take 1 Tab by mouth every seven (7) days. 70 mg  
    
   
   
   
  
 furosemide 40 mg tablet Commonly known as:  LASIX Your last dose was: Your next dose is: TAKE ONE TABLET BY MOUTH EVERY DAY  
     
   
   
   
  
 glimepiride 4 mg tablet Commonly known as:  AMARYL Your last dose was: Your next dose is: Take 4 mg by mouth daily. 4 mg  
    
   
   
   
  
 metoprolol tartrate 25 mg tablet Commonly known as:  LOPRESSOR Your last dose was: Your next dose is: TAKE 1 TABLET (25 MG) BY ORAL ROUTE 2 TIMES PER DAY (GENERIC LOPRESSOR)  
     
   
   
   
  
 sirolimus 1 mg tablet Commonly known as:  RAPAMUNE Your last dose was: Your next dose is: Take 4 Tabs by mouth daily. 4 mg VYVANSE PO Your last dose was: Your next dose is: Take  by mouth. Discharge Instructions [facility] Jarrell Vivas MD, Rito Chapin, FAASLD Nisha Weir, ZAKI Barker, [facility]   SILVA Hopson NP  
 
   at [facility] 
   [address] [facility], [address] [address] 
   [phone] FAX: [phone]    at [facility] 
   [address], [address] Marta Shepard, [address] 
   [phone] FAX: [phone] LIVER BIOPSY DISCHARGE INSTRUCTIONS ROZINA/ [address] 1955 Date: 11/7/2017 DIET:   
You may resume your previous diet. ACTIVITIES: 
Rest quietly the rest of today. You should not lift any objects more than 20 pounds for the next 2 days. If you work sitting down without strenuous activity you may return to work tomorrow. If you exert yourself or do heavy lifting at work you should take tomorrow off. Do not drive or operate hazardous machinery for 12 hours. SPECIAL INSTRUCTIONS: 
Do not use any aspirin or non-steroidal (Motin, Advil, Naproxen, etc) pain medications for the next 3 days. You may use extra-strength Tylenol (acetaminophen) if you experience pain or discomfort later today. Call the [address] office if you experience any of the following: 
Persistent or severe abdominal pain. Persistent or severe abdominal distention. Fever and chills Nausea and vomiting. New or unusual symptoms. Follow-up care: You should have a follow up appointment with Dr. Michael Dennis to review the results of the liver biopsy results in 2 weeks. If you do not have an appointment please call the office at the number listed above to schedule this. Other instructions: If you have any problems or questions call the [facility]aez [address] office at the phone number listed above. DISCHARGE SUMMARY from Nurse: The following personal items collected during your admission are returned to you:  
Dental Appliance: Dental Appliances: None Vision: Visual Aid: None Hearing Aid:   
Jewelry:   
Clothing:   
Other Valuables:   
Valuables sent to safe: Ana Crook DISCHARGE SUMMARY from Nurse PATIENT INSTRUCTIONS: 
 
 
F-face looks uneven A-arms unable to move or move unevenly S-speech slurred or non-existent T-time-call 911 as soon as signs and symptoms begin-DO NOT go Back to bed or wait to see if you get better-TIME IS BRAIN. Warning Signs of HEART ATTACK Call 911 if you have these symptoms: 
? Chest discomfort. Most heart attacks involve discomfort in the center of the chest that lasts more than a few minutes, or that goes away and comes back. It can feel like uncomfortable pressure, squeezing, fullness, or pain. ? Discomfort in other areas of the upper body. Symptoms can include pain or discomfort in one or both arms, the back, neck, jaw, or stomach. ? Shortness of breath with or without chest discomfort. ? Other signs may include breaking out in a cold sweat, nausea, or lightheadedness. Don't wait more than five minutes to call [address]! Fast action can save your life. Calling 911 is almost always the fastest way to get lifesaving treatment. Emergency Medical Services staff can begin treatment when they arrive  up to an hour sooner than if someone gets to the hospital by car. The discharge information has been reviewed with the patient and caregiver. The patient and caregiver verbalized understanding. Discharge medications reviewed with the patient and caregiver and appropriate educational materials and side effects teaching were provided. _____________________ Patient armband removed and shredded______________________________________________________________________________________________________________ Introducing [facility]! Jennifer Oconnor introduces [email or web address] patient portal. Now you can access parts of your medical record, email your doctor's office, and request medication refills online. 1. In your internet browser, go to [email or web address] 2. Click on the First Time User? Click Here link in the Sign In box. You will see the New Member Sign Up page. 3. Enter your [email or web address] Access Code exactly as it appears below. You will not need to use this code after youve completed the sign-up process. If you do not sign up before the expiration date, you must request a new code. · [email or web address] Access Code: [email or web address] Expires: 2/4/2018 11:29 AM 
 
4. Enter the last four digits of your Social Security Number (xxxx) and Date of Birth (mm/dd/yyyy) as indicated and click Submit. You will be taken to the next sign-up page. 5. Create a [email or web address] ID. This will be your [email or web address] login ID and cannot be changed, so think of one that is secure and easy to remember. 6. Create a [email or web address] password. You can change your password at any time. 7. Enter your Password Reset Question and Answer. This can be used at a later time if you forget your password. 8. Enter your e-mail address. You will receive e-mail notification when new information is available in [address] 9. Click Sign Up. You can now view and download portions of your medical record. 10. Click the Download Summary menu link to download a portable copy of your medical information. If you have questions, please visit the Frequently Asked Questions section of the [email or web address] website.  Remember, [email or web address] is NOT to be used for urgent needs. For medical emergencies, dial 911. Now available from your iPhone and Android! Providers Seen During Your Hospitalization Provider Specialty Primary office phone Lisbeth Winter MD Hepatology [phone] Your Primary Care Physician (PCP) Primary Care Physician Office Phone Office Fax Jade Long [phone] You are allergic to the following Allergen Reactions Percocet (Oxycodone-Acetaminophen) Shortness of Breath Itching Other (comments) \"Burning skin\" Recent Documentation Height Weight Breastfeeding? BMI OB Status Smoking Status 1.651 m 80.7 kg No 29.62 kg/m2 Postmenopausal Never Smoker Emergency Contacts Name Discharge Info Relation Home Work Mobile Elieser Dennis DISCHARGE CAREGIVER [3] Friend [5] [phone] Allisonzuleika Ferraronain CAREGIVER [3] Daughter [21] [phone]    
 D,Rhina  Child [2] [phone] Patient Belongings The following personal items are in your possession at time of discharge: 
  Dental Appliances: None  Visual Aid: None Please provide this summary of care documentation to your next provider. Signatures-by signing, you are acknowledging that this After Visit Summary has been reviewed with you and you have received a copy. Patient Signature:  ____________________________________________________________ Date:  ____________________________________________________________  
  
[facility] Provider Signature:  ____________________________________________________________ Date:  ____________________________________________________________

## 2017-11-07 NOTE — H&P
[address]choa MD, [address], [place], [place]       Vipul Roberts, ZAKI Darby, [facility]   SILVA Dent NP        at [address], [address] [address]     [phone]     FAX: [phone]    at [address], [address] [facility], [address]     [phone]     FAX: [phone]       PRE-PROCEDURE NOTE - LIVER BIOPSY    H and P from last office visit reviewed. Allergies reviewed. Out-patient medication list reviewed. Patient Active Problem List   Diagnosis Code    [facility] Z94.4    Diabetes mellitus ([facility] E11.9    Colon polyps K63.5    [facility] cancer [facility] C50.919    H/O shoulder surgery Z98.890    Back pain, lumbosacral M54.5    [facility] Z94.4    Encounter for long-term (current) use of other medications J44.812    Complications of transplanted liver T86.40       Allergies   Allergen Reactions    Percocet [Oxycodone-Acetaminophen] Shortness of Breath, Itching and Other (comments)     \"Burning skin\"       No current facility-administered medications on file prior to encounter. Current Outpatient Prescriptions on File Prior to Encounter   Medication Sig Dispense Refill    sirolimus (RAPAMUNE) 1 mg tablet Take 4 Tabs by mouth daily. 360 Tab 3    alendronate (FOSAMAX) 70 mg tablet Take 1 Tab by mouth every seven (7) days. 12 Tab 3    LISDEXAMFETAMINE DIMESYLATE (VYVANSE PO) Take  by mouth.  metoprolol tartrate (LOPRESSOR) 25 mg tablet TAKE 1 TABLET (25 MG) BY ORAL ROUTE 2 TIMES PER DAY (GENERIC LOPRESSOR) 60 Tab 10    furosemide (LASIX) 40 mg tablet TAKE ONE TABLET BY MOUTH EVERY DAY 90 Tab 3    glimepiride (AMARYL) 4 mg tablet Take 4 mg by mouth daily. For liver biopsy to assess elevated liver enzymes in a liver transplant recipient. The risks of the procedure were discussed with the patient. This included bleeding, pain, and puncture of other organs. All questions were answered. The patient wishes to proceed with the procedure. PHYSICAL EXAMINATION:  VS: per nursing note  General: No acute distress. Eyes: Sclera anicteric. ENT: No oral lesions. Thyroid normal.  Nodes: No adenopathy. Skin: No spider angiomata. No jaundice. No palmar erythema. Respiratory: Lungs clear to auscultation. Cardiovascular: Regular heart rate. No murmurs. No JVD. Abdomen: Soft non-tender, liver size normal to percussion/palpation. Spleen not palpable. No obvious ascites. Extremities: No edema. No muscle wasting. No gross arthritic changes. Neurologic: Alert and oriented. Cranial nerves grossly intact. No asterixis. LABS:  Lab Results   Component Value Date/Time    WBC 5.2 11/06/2017 12:58 PM    Hemoglobin, POC 11.2 07/19/2013 11:30 AM    HGB 11.4 11/06/2017 12:58 PM    Hematocrit, POC 33 07/19/2013 11:30 AM    HCT 34.9 11/06/2017 12:58 PM    PLATELET 337 68/35/3241 12:58 PM    MCV 86.2 11/06/2017 12:58 PM     Lab Results   Component Value Date/Time    INR 0.8 11/06/2017 12:58 PM    INR 0.90 11/02/2017 11:37 AM    INR 2.0 04/11/2013 02:45 PM    Prothrombin time 10.6 11/06/2017 12:58 PM    Prothrombin time 9.4 11/02/2017 11:37 AM    Prothrombin time 22.0 04/11/2013 02:45 PM       ASSESSMENT AND PLAN:  Liver biopsy under ultrasound guidance.     Chauncey Soto MD  Liver [place] of [address] [place] [address], [address] [place] [address], [address] [place] [address] Street [address]  [phone]

## 2017-11-07 NOTE — PROCEDURES
[address]see Ochoa MD, [address], [place], [place]       SILVA Mcintyre PA-C Lianne Mano, [facility]   SILVA Bullard NP        at [address], [address] [address] [employer]  [address].     [phone]     FAX: [phone]    at [facility], [address] [place] [address], [address] [facility] [address]     [phone]     FAX: [phone]       LIVER BIOPSY PROCEDURE NOTE    Liseth Saavedra  1955    INDICATIONS/PRE-OPERATIVE  DIAGNOSIS:  Evaluation of elevated liver chemistries in liver transplant recipient    : Mann Back MD    SEDATION: 1% Lidocaine injection 10 ml    PROCEDURE:  Informed consent to perform the procedure was obtained from the patient. The patient was positioned on the edge of the stretcher lying flat in the supine position. Ultrasound was utilized to image the liver. The diaphragm and any major mass lesion or vascular structures within the liver were identified. An appropriate site for liver biopsy was identified. The distance from the surface of the skin to the liver capsule was 3 cm. This area was prepped with betadine and draped in sterile fashion. The skin was infiltrated with 1% lidocaine. The deeper subcutanous tissues and liver capsule overlying the biopsy site were then infiltrated with 1% lidocaine until appropriate anesthesia was obtained. A small incision was made in the skin so the biopsy devise could be easily inserted. A total of 2 passes with the 18 gauge Bard biopsy devise was then made into the liver. Core(s) of liver tissue totaling 4 cm in length were obtained and placed into tissue fixative. A band aid was placed over the biopsy site. The patient was then repositioned on the right side and transported to the recovery area on the stretcher for routine monitoring until discharge.     The specimen was sent to pathology for processing via the normal transport mechanism. SPECIMEN REMOVED: Liver    ESTIMATED BLOOD LOSS: Negligible.       POST-OPERATIVE DIAGNOSIS: Same as Pre-operative Diagnosis    Julian Jarquin MD       11/7/2017  8:00 AM

## 2017-11-07 NOTE — DISCHARGE INSTRUCTIONS
[address] MD Don, [address], [facility] [place], [place]       Pagan Filter, NP    ZAKI Rae, [facility]   SILVA Smart NP        at [facility], [address] [facility], [address]     [phone]     FAX: [phone]    at [facility]     [facility], [address] [place] [address], [address]     [phone]     FAX: [phone]       LIVER BIOPSY DISCHARGE INSTRUCTIONS      Glen Shanks  1955  Date: 11/7/2017    DIET:    You may resume your previous diet. ACTIVITIES:  Rest quietly the rest of today. You should not lift any objects more than 20 pounds for the next 2 days. If you work sitting down without strenuous activity you may return to work tomorrow. If you exert yourself or do heavy lifting at work you should take tomorrow off. Do not drive or operate hazardous machinery for 12 hours. SPECIAL INSTRUCTIONS:  Do not use any aspirin or non-steroidal (Motin, Advil, Naproxen, etc) pain medications for the next 3 days. You may use extra-strength Tylenol (acetaminophen) if you experience pain or discomfort later today. Call the [address] office if you experience any of the following:  Persistent or severe abdominal pain. Persistent or severe abdominal distention. Fever and chills   Nausea and vomiting. New or unusual symptoms. Follow-up care: You should have a follow up appointment with Dr. Desean Clemens to review the results of the liver biopsy results in 2 weeks. If you do not have an appointment please call the office at the number listed above to schedule this. Other instructions: If you have any problems or questions call the [facility] office at the phone number listed above. DISCHARGE SUMMARY from Nurse:     The following personal items collected during your admission are returned to you: Dental Appliance: Dental Appliances: None  Vision: Visual Aid: None  Hearing Aid:    Jewelry:    Clothing:    Other Valuables:    Valuables sent to safe: Jose [facility]cantara DISCHARGE SUMMARY from Nurse    PATIENT INSTRUCTIONS:    After general anesthesia or intravenous sedation, for 24 hours or while taking prescription Narcotics:  · Limit your activities  · Do not drive and operate hazardous machinery  · Do not make important personal or business decisions  · Do  not drink alcoholic beverages  · If you have not urinated within 8 hours after discharge, please contact your surgeon on call. Report the following to your surgeon:  · Excessive pain, swelling, redness or odor of or around the surgical area  · Temperature over 100.5  · Nausea and vomiting lasting longer than 4 hours or if unable to take medications  · Any signs of decreased circulation or nerve impairment to extremity: change in color, persistent  numbness, tingling, coldness or increase pain  · Any questions    What to do at Home:  Recommended activity: Activity as tolerated and no driving for today,     If you experience any of the following symptoms as above, please follow up with Dr. Shannon Pulido. *  Please give a list of your current medications to your Primary Care Provider. *  Please update this list whenever your medications are discontinued, doses are      changed, or new medications (including over-the-counter products) are added. *  Please carry medication information at all times in case of emergency situations. These are general instructions for a healthy lifestyle:    No smoking/ No tobacco products/ Avoid exposure to second hand smoke  Surgeon General's Warning:  Quitting smoking now greatly reduces serious risk to your health.     Obesity, smoking, and sedentary lifestyle greatly increases your risk for illness    A healthy diet, regular physical exercise & weight monitoring are important for maintaining a healthy lifestyle    You may be retaining fluid if you have a history of heart failure or if you experience any of the following symptoms:  Weight gain of 3 pounds or more overnight or 5 pounds in a week, increased swelling in our hands or feet or shortness of breath while lying flat in bed. Please call your doctor as soon as you notice any of these symptoms; do not wait until your next office visit. Recognize signs and symptoms of STROKE:    F-face looks uneven    A-arms unable to move or move unevenly    S-speech slurred or non-existent    T-time-call 911 as soon as signs and symptoms begin-DO NOT go       Back to bed or wait to see if you get better-TIME IS BRAIN. Warning Signs of HEART ATTACK     Call 911 if you have these symptoms:   Chest discomfort. Most heart attacks involve discomfort in the center of the chest that lasts more than a few minutes, or that goes away and comes back. It can feel like uncomfortable pressure, squeezing, fullness, or pain.  Discomfort in other areas of the upper body. Symptoms can include pain or discomfort in one or both arms, the back, neck, jaw, or stomach.  Shortness of breath with or without chest discomfort.  Other signs may include breaking out in a cold sweat, nausea, or lightheadedness. Don't wait more than five minutes to call 911 - MINUTES MATTER! Fast action can save your life. Calling 911 is almost always the fastest way to get lifesaving treatment. Emergency Medical Services staff can begin treatment when they arrive -- up to an hour sooner than if someone gets to the hospital by car. The discharge information has been reviewed with the patient and caregiver. The patient and caregiver verbalized understanding. Discharge medications reviewed with the patient and caregiver and appropriate educational materials and side effects teaching were provided.   _____________________  Patient armband removed and shredded______________________________________________________________________________________________________________

## 2017-11-08 ENCOUNTER — PATIENT OUTREACH (OUTPATIENT)
Dept: HEMATOLOGY | Age: 62
End: 2017-11-08

## 2017-11-08 RX ORDER — MYCOPHENOLATE MOFETIL 500 MG/1
1000 TABLET ORAL 2 TIMES DAILY
Qty: 120 TAB | Refills: 11 | Status: SHIPPED | OUTPATIENT
Start: 2017-11-08 | End: 2019-03-04 | Stop reason: SDUPTHER

## 2017-11-08 RX ORDER — PREDNISONE 20 MG/1
60 TABLET ORAL
Qty: 90 TAB | Refills: 0 | Status: SHIPPED | OUTPATIENT
Start: 2017-11-08 | End: 2017-12-15 | Stop reason: SDUPTHER

## 2017-11-08 NOTE — PROGRESS NOTES
Received notification from Dr. Sintia Luna and KALIA White NP that patient's biopsy revealed rejection. Phone call placed to [facility] scheduling ([phone]). Spoke with [place] and scheduled patient's first Solumedrol infusion for tomorrow, 11/9. Phone call placed to patient. Notified of biopsy results and plan for steroid treatment. Patient notified of appointment tomorrow at [address] [facility] at 9 am.     Plan to administer Solumedrol IV 1 gram x 3 days, 500 mg x 1 day, 250 mg x 1 day, and 100 mg x 1 day. Patient notified to begin Prednisone 60 mg PO the following day. Notified to increase Sirolimus from 1 mg daily to 2 mg daily beginning now. Also notified to start Cellcept 500 mg BID now. Patient to have daily labs drawn at [facility] to monitor response. Will follow labs and communicate with patient. Patient verbalized understanding by read-back.

## 2017-11-09 ENCOUNTER — HOSPITAL ENCOUNTER (OUTPATIENT)
Dept: INFUSION THERAPY | Age: 62
Discharge: HOME | End: 2017-11-09
Payer: MEDICARE

## 2017-11-09 ENCOUNTER — TELEPHONE (OUTPATIENT)
Dept: HEMATOLOGY | Age: 62
End: 2017-11-09

## 2017-11-09 VITALS
HEIGHT: 66 IN | HEART RATE: 81 BPM | RESPIRATION RATE: 18 BRPM | BODY MASS INDEX: 28.45 KG/M2 | DIASTOLIC BLOOD PRESSURE: 71 MMHG | OXYGEN SATURATION: 98 % | WEIGHT: 177 LBS | SYSTOLIC BLOOD PRESSURE: 130 MMHG | TEMPERATURE: 98 F

## 2017-11-09 LAB
ALBUMIN SERPL-MCNC: 2.9 G/DL (ref 3.4–5)
ALBUMIN/GLOB SERPL: 0.6 {RATIO} (ref 0.8–1.7)
ALP SERPL-CCNC: 316 U/L (ref 45–117)
ALT SERPL-CCNC: 323 U/L (ref 13–56)
ANION GAP SERPL CALC-SCNC: 13 MMOL/L (ref 3–18)
AST SERPL-CCNC: 318 U/L (ref 15–37)
BILIRUB SERPL-MCNC: 0.4 MG/DL (ref 0.2–1)
BUN SERPL-MCNC: 22 MG/DL (ref 7–18)
BUN/CREAT SERPL: 12 (ref 12–20)
CALCIUM SERPL-MCNC: 8.6 MG/DL (ref 8.5–10.1)
CHLORIDE SERPL-SCNC: 100 MMOL/L (ref 100–108)
CMV DNA SERPL NAA+PROBE-ACNC: NEGATIVE IU/ML
CMV DNA SERPL NAA+PROBE-LOG IU: NORMAL LOG10 IU/ML
CO2 SERPL-SCNC: 25 MMOL/L (ref 21–32)
CREAT SERPL-MCNC: 1.78 MG/DL (ref 0.6–1.3)
GLOBULIN SER CALC-MCNC: 5.1 G/DL (ref 2–4)
GLUCOSE SERPL-MCNC: 287 MG/DL (ref 74–99)
POTASSIUM SERPL-SCNC: 3.5 MMOL/L (ref 3.5–5.5)
PROT SERPL-MCNC: 8 G/DL (ref 6.4–8.2)
SODIUM SERPL-SCNC: 138 MMOL/L (ref 136–145)

## 2017-11-09 PROCEDURE — 74011000258 HC RX REV CODE- 258: Performed by: NURSE PRACTITIONER

## 2017-11-09 PROCEDURE — 74011250636 HC RX REV CODE- 250/636: Performed by: NURSE PRACTITIONER

## 2017-11-09 PROCEDURE — 96365 THER/PROPH/DIAG IV INF INIT: CPT

## 2017-11-09 PROCEDURE — 80053 COMPREHEN METABOLIC PANEL: CPT | Performed by: INTERNAL MEDICINE

## 2017-11-09 RX ORDER — SODIUM CHLORIDE 0.9 % (FLUSH) 0.9 %
10-40 SYRINGE (ML) INJECTION AS NEEDED
Status: DISCONTINUED | OUTPATIENT
Start: 2017-11-09 | End: 2017-11-13 | Stop reason: HOSPADM

## 2017-11-09 RX ADMIN — SODIUM CHLORIDE 1000 MG: 900 INJECTION, SOLUTION INTRAVENOUS at 10:09

## 2017-11-09 RX ADMIN — Medication 10 ML: at 11:05

## 2017-11-09 NOTE — PROGRESS NOTES
[facility] Progress Note    Date: 2017    Name: Nicole Living    MRN: [id number]         : 1955    Solumedrol #1 of 6      Ms. Raines to [facility], ambulatory at 0915. Pt was assessed and education was provided. Ms Vonnie Burrell reports that she had a liver transplant in  and that she had a liver biopsy on 17 due to increased liver enzymes. She also reports that she has had a partial mastectomy on her right breast. Ms Vonnie Burrell is extremely talkative. Carenotes provided and patient given a printed copy to take home. Ms. Sonya Lazar vitals were reviewed and patient was observed for 5 minutes prior to treatment. Visit Vitals    /71 (BP 1 Location: Left arm, BP Patient Position: Sitting)    Pulse 81    Temp 98 °F (36.7 °C)    Resp 18    Ht 5' 5.5\" (1.664 m)    Wt 80.3 kg (177 lb)    SpO2 98%    Breastfeeding No    BMI 29.01 kg/m2     24 g PIV inserted in left wrist x one attempt. PIV flushed easily and had brisk blood return. Labs drawn for CMP. Recent Results (from the past 12 hour(s))   METABOLIC PANEL, COMPREHENSIVE    Collection Time: 17  9:15 AM   Result Value Ref Range    Sodium 138 136 - 145 mmol/L    Potassium 3.5 3.5 - 5.5 mmol/L    Chloride 100 100 - 108 mmol/L    CO2 25 21 - 32 mmol/L    Anion gap 13 3.0 - 18 mmol/L    Glucose 287 (H) 74 - 99 mg/dL    BUN 22 (H) 7.0 - 18 MG/DL    Creatinine 1.78 (H) 0.6 - 1.3 MG/DL    BUN/Creatinine ratio 12 12 - 20      GFR est AA 35 (L) >60 ml/min/1.73m2    GFR est non-AA 29 (L) >60 ml/min/1.73m2    Calcium 8.6 8.5 - 10.1 MG/DL    Bilirubin, total 0.4 0.2 - 1.0 MG/DL    ALT (SGPT) 323 (H) 13 - 56 U/L    AST (SGOT) 318 (H) 15 - 37 U/L    Alk. phosphatase 316 (H) 45 - 117 U/L    Protein, total 8.0 6.4 - 8.2 g/dL    Albumin 2.9 (L) 3.4 - 5.0 g/dL    Globulin 5.1 (H) 2.0 - 4.0 g/dL    A-G Ratio 0.6 (L) 0.8 - 1.7           1000 mg solumedrol infused as ordered. Pt tolerated infusion well and denied complaints.      Patient declined to have PIV left in wrist for tomorrow's infusion. PIV flushed with 10 ml NS and removed. Discussed signs and symptoms to report. Patient verbalized understanding. Patient armband removed and shredded. Ms. Sofya Bowden was discharged from [place] [address] in stable condition at 1115. She is to return on 11/10/17 at 0930 for her next solumedrol infusion.     Wali Kevin RN  November 9, 2017

## 2017-11-09 NOTE — TELEPHONE ENCOUNTER
Pt would like to know if it's ok for her to ride her bike being that she had a liver biopsy on Tuesday? I suggested that she could, Dr. Roseline Hummel only ask for you to take it light the day of the procedure but she insisted that I ask you.

## 2017-11-10 ENCOUNTER — HOSPITAL ENCOUNTER (OUTPATIENT)
Dept: INFUSION THERAPY | Age: 62
Discharge: HOME | End: 2017-11-10
Payer: MEDICARE

## 2017-11-10 VITALS
RESPIRATION RATE: 18 BRPM | SYSTOLIC BLOOD PRESSURE: 132 MMHG | DIASTOLIC BLOOD PRESSURE: 66 MMHG | HEART RATE: 83 BPM | TEMPERATURE: 98.1 F | OXYGEN SATURATION: 100 %

## 2017-11-10 LAB
ALBUMIN SERPL-MCNC: 2.9 G/DL (ref 3.4–5)
ALBUMIN/GLOB SERPL: 0.6 {RATIO} (ref 0.8–1.7)
ALP SERPL-CCNC: 298 U/L (ref 45–117)
ALT SERPL-CCNC: 292 U/L (ref 13–56)
ANION GAP SERPL CALC-SCNC: 15 MMOL/L (ref 3–18)
AST SERPL-CCNC: 202 U/L (ref 15–37)
BILIRUB SERPL-MCNC: 0.3 MG/DL (ref 0.2–1)
BUN SERPL-MCNC: 29 MG/DL (ref 7–18)
BUN/CREAT SERPL: 16 (ref 12–20)
CALCIUM SERPL-MCNC: 8.5 MG/DL (ref 8.5–10.1)
CHLORIDE SERPL-SCNC: 102 MMOL/L (ref 100–108)
CO2 SERPL-SCNC: 22 MMOL/L (ref 21–32)
CREAT SERPL-MCNC: 1.86 MG/DL (ref 0.6–1.3)
GLOBULIN SER CALC-MCNC: 5 G/DL (ref 2–4)
GLUCOSE SERPL-MCNC: 211 MG/DL (ref 74–99)
POTASSIUM SERPL-SCNC: 3.7 MMOL/L (ref 3.5–5.5)
PROT SERPL-MCNC: 7.9 G/DL (ref 6.4–8.2)
SODIUM SERPL-SCNC: 139 MMOL/L (ref 136–145)

## 2017-11-10 PROCEDURE — 96365 THER/PROPH/DIAG IV INF INIT: CPT

## 2017-11-10 PROCEDURE — 80053 COMPREHEN METABOLIC PANEL: CPT | Performed by: NURSE PRACTITIONER

## 2017-11-10 PROCEDURE — 74011000258 HC RX REV CODE- 258: Performed by: NURSE PRACTITIONER

## 2017-11-10 PROCEDURE — 74011250636 HC RX REV CODE- 250/636: Performed by: NURSE PRACTITIONER

## 2017-11-10 PROCEDURE — 36415 COLL VENOUS BLD VENIPUNCTURE: CPT | Performed by: NURSE PRACTITIONER

## 2017-11-10 RX ORDER — SODIUM CHLORIDE 0.9 % (FLUSH) 0.9 %
10-40 SYRINGE (ML) INJECTION AS NEEDED
Status: DISCONTINUED | OUTPATIENT
Start: 2017-11-10 | End: 2017-11-14 | Stop reason: HOSPADM

## 2017-11-10 RX ADMIN — Medication 10 ML: at 10:43

## 2017-11-10 RX ADMIN — SODIUM CHLORIDE 1000 MG: 900 INJECTION, SOLUTION INTRAVENOUS at 10:07

## 2017-11-10 NOTE — PROGRESS NOTES
[facility] Progress Note    Date: November 10, 2017    Name: Jeremías Moseley    MRN: [id number]         : 1955    Solumedrol #2 of 6    Ms. Raines arrived to [address] [place] [address] at [address] Ms. Arielle Nava was assessed and education was provided. Ms Arielle Nava denies complaints since receiving her first dose of solumedrol yesterday. Ms. Chris Roberts vitals were reviewed. Visit Vitals    /68 (BP 1 Location: Left arm, BP Patient Position: Sitting)    Pulse 89    Temp 98 °F (36.7 °C)    Resp 18    SpO2 97%     Patient Vitals for the past 12 hrs:   Temp Pulse Resp BP SpO2   11/10/17 1048 98.1 °F (36.7 °C) 83 18 132/66 100 %   11/10/17 0940 98 °F (36.7 °C) 89 18 146/68 97 %       24g IV inserted in patient's Left hand  X two attempts  Positive for blood return/ flushes without difficulty. Blood drawn for CMP as ordered and sent to lab. Recent Results (from the past 12 hour(s))   METABOLIC PANEL, COMPREHENSIVE    Collection Time: 11/10/17  9:45 AM   Result Value Ref Range    Sodium 139 136 - 145 mmol/L    Potassium 3.7 3.5 - 5.5 mmol/L    Chloride 102 100 - 108 mmol/L    CO2 22 21 - 32 mmol/L    Anion gap 15 3.0 - 18 mmol/L    Glucose 211 (H) 74 - 99 mg/dL    BUN 29 (H) 7.0 - 18 MG/DL    Creatinine 1.86 (H) 0.6 - 1.3 MG/DL    BUN/Creatinine ratio 16 12 - 20      GFR est AA 33 (L) >60 ml/min/1.73m2    GFR est non-AA 27 (L) >60 ml/min/1.73m2    Calcium 8.5 8.5 - 10.1 MG/DL    Bilirubin, total 0.3 0.2 - 1.0 MG/DL    ALT (SGPT) 292 (H) 13 - 56 U/L    AST (SGOT) 202 (H) 15 - 37 U/L    Alk. phosphatase 298 (H) 45 - 117 U/L    Protein, total 7.9 6.4 - 8.2 g/dL    Albumin 2.9 (L) 3.4 - 5.0 g/dL    Globulin 5.0 (H) 2.0 - 4.0 g/dL    A-G Ratio 0.6 (L) 0.8 - 1.7           1000 mg solumedrol  administered as ordered followed by NS flush. Ms. Arielle Nava tolerated well without complaints. IV removed/ intact. Gauze/ coban to site. Ms. Arielle Nava was discharged from [place] [address] in stable condition at 1050.   She is to return on 11/11/17 at 0900 for her next appointment.     Royal Ashwini RN  November 10, 2017

## 2017-11-11 ENCOUNTER — HOSPITAL ENCOUNTER (OUTPATIENT)
Dept: INFUSION THERAPY | Age: 62
Discharge: HOME | End: 2017-11-11
Payer: MEDICARE

## 2017-11-11 VITALS
DIASTOLIC BLOOD PRESSURE: 65 MMHG | HEART RATE: 79 BPM | TEMPERATURE: 98 F | RESPIRATION RATE: 18 BRPM | SYSTOLIC BLOOD PRESSURE: 138 MMHG | OXYGEN SATURATION: 100 %

## 2017-11-11 PROCEDURE — 80053 COMPREHEN METABOLIC PANEL: CPT | Performed by: INTERNAL MEDICINE

## 2017-11-11 PROCEDURE — 74011250636 HC RX REV CODE- 250/636: Performed by: NURSE PRACTITIONER

## 2017-11-11 PROCEDURE — 74011000258 HC RX REV CODE- 258: Performed by: NURSE PRACTITIONER

## 2017-11-11 PROCEDURE — 96365 THER/PROPH/DIAG IV INF INIT: CPT

## 2017-11-11 PROCEDURE — 36415 COLL VENOUS BLD VENIPUNCTURE: CPT | Performed by: INTERNAL MEDICINE

## 2017-11-11 RX ORDER — SIROLIMUS 1 MG/1
3 TABLET, FILM COATED ORAL DAILY
COMMUNITY
End: 2018-11-27 | Stop reason: SDUPTHER

## 2017-11-11 RX ORDER — SODIUM CHLORIDE 0.9 % (FLUSH) 0.9 %
10-40 SYRINGE (ML) INJECTION AS NEEDED
Status: DISCONTINUED | OUTPATIENT
Start: 2017-11-11 | End: 2017-11-11

## 2017-11-11 RX ORDER — SODIUM CHLORIDE 0.9 % (FLUSH) 0.9 %
10-40 SYRINGE (ML) INJECTION AS NEEDED
Status: DISCONTINUED | OUTPATIENT
Start: 2017-11-11 | End: 2017-11-15 | Stop reason: HOSPADM

## 2017-11-11 RX ADMIN — SODIUM CHLORIDE 1000 MG: 900 INJECTION INTRAVENOUS at 09:28

## 2017-11-11 RX ADMIN — Medication 10 ML: at 10:33

## 2017-11-11 RX ADMIN — Medication 10 ML: at 09:28

## 2017-11-11 NOTE — PROGRESS NOTES
[facility] Progress Note    Date: 2017    Name: Zoya Gan    MRN: [id number]         : 1955    Solumedrol Infusion # 3 of 6    Ms. Raines to [facility], ambulatory, at 3827. Pt was assessed and education was provided. Patient denies complaints today other than some soreness at liver biopsy site = 1/10. She reports difficulty in getting a prescription from her PCP for fast-acting insulin to cover higher blood sugars while getting Solumedrol and states her blood sugar was close to 500 at one point last night. Advised her to contact her pharmacist to see if the script had been called in and if not to try to contact PCP or Semaj Jeffrey NP. Advised her to go to ED if blood sugars continue to rise and she has not been able to obtain her insulin. Ms. Chuck Adams vitals were reviewed and patient was observed for 5 minutes prior to treatment. Visit Vitals    /65 (BP 1 Location: Left arm, BP Patient Position: Sitting)    Pulse 79    Temp 98 °F (36.7 °C)    Resp 18    SpO2 100%     Patient Vitals for the past 12 hrs:   Temp Pulse Resp BP SpO2   17 1036 - 79 18 138/65 100 %   17 0907 98 °F (36.7 °C) 73 18 139/71 99 %       22 g PIV placed in left lower forearm x one attempt. PIV flushed easily and had brisk blood return. Blood drawn off and sent to lab for CMP per orders. Solumedrol 1000 mg infused over one hour. Ms. Rachell Christy tolerated the infusion, and had no complaints. VS remained stable. PIV flushed with NS 10 ml and removed. No bleeding or hematoma noted at site. Guaze and coban applied. Reviewed discharge instructions with patient, including expected side effects of flushing and insomnia. Patient armband removed and shredded. Ms. Rachell Christy was discharged from [place] [address] in stable condition at 1035. She is to return to [facility] on 17 at 0900 for her next solumedrol infusion.     Kristin Ye RN  2017

## 2017-11-12 ENCOUNTER — HOSPITAL ENCOUNTER (OUTPATIENT)
Dept: INFUSION THERAPY | Age: 62
Discharge: HOME | End: 2017-11-12
Payer: MEDICARE

## 2017-11-12 VITALS
DIASTOLIC BLOOD PRESSURE: 67 MMHG | SYSTOLIC BLOOD PRESSURE: 138 MMHG | OXYGEN SATURATION: 100 % | HEART RATE: 72 BPM | RESPIRATION RATE: 18 BRPM | TEMPERATURE: 98.1 F

## 2017-11-12 LAB
ALBUMIN SERPL-MCNC: 3.2 G/DL (ref 3.4–5)
ALBUMIN SERPL-MCNC: 3.3 G/DL (ref 3.4–5)
ALBUMIN/GLOB SERPL: 0.7 {RATIO} (ref 0.8–1.7)
ALBUMIN/GLOB SERPL: 0.7 {RATIO} (ref 0.8–1.7)
ALP SERPL-CCNC: 250 U/L (ref 45–117)
ALP SERPL-CCNC: 270 U/L (ref 45–117)
ALT SERPL-CCNC: 194 U/L (ref 13–56)
ALT SERPL-CCNC: 252 U/L (ref 13–56)
ANION GAP SERPL CALC-SCNC: 12 MMOL/L (ref 3–18)
ANION GAP SERPL CALC-SCNC: 15 MMOL/L (ref 3–18)
AST SERPL-CCNC: 120 U/L (ref 15–37)
AST SERPL-CCNC: 74 U/L (ref 15–37)
BILIRUB SERPL-MCNC: 0.3 MG/DL (ref 0.2–1)
BILIRUB SERPL-MCNC: 0.3 MG/DL (ref 0.2–1)
BUN SERPL-MCNC: 36 MG/DL (ref 7–18)
BUN SERPL-MCNC: 40 MG/DL (ref 7–18)
BUN/CREAT SERPL: 21 (ref 12–20)
BUN/CREAT SERPL: 24 (ref 12–20)
CALCIUM SERPL-MCNC: 8.3 MG/DL (ref 8.5–10.1)
CALCIUM SERPL-MCNC: 8.6 MG/DL (ref 8.5–10.1)
CHLORIDE SERPL-SCNC: 103 MMOL/L (ref 100–108)
CHLORIDE SERPL-SCNC: 104 MMOL/L (ref 100–108)
CO2 SERPL-SCNC: 24 MMOL/L (ref 21–32)
CO2 SERPL-SCNC: 26 MMOL/L (ref 21–32)
CREAT SERPL-MCNC: 1.68 MG/DL (ref 0.6–1.3)
CREAT SERPL-MCNC: 1.69 MG/DL (ref 0.6–1.3)
GLOBULIN SER CALC-MCNC: 4.9 G/DL (ref 2–4)
GLOBULIN SER CALC-MCNC: 4.9 G/DL (ref 2–4)
GLUCOSE SERPL-MCNC: 56 MG/DL (ref 74–99)
GLUCOSE SERPL-MCNC: 70 MG/DL (ref 74–99)
POTASSIUM SERPL-SCNC: 3.3 MMOL/L (ref 3.5–5.5)
POTASSIUM SERPL-SCNC: 3.7 MMOL/L (ref 3.5–5.5)
PROT SERPL-MCNC: 8.1 G/DL (ref 6.4–8.2)
PROT SERPL-MCNC: 8.2 G/DL (ref 6.4–8.2)
SODIUM SERPL-SCNC: 142 MMOL/L (ref 136–145)
SODIUM SERPL-SCNC: 142 MMOL/L (ref 136–145)

## 2017-11-12 PROCEDURE — 96365 THER/PROPH/DIAG IV INF INIT: CPT

## 2017-11-12 PROCEDURE — 80053 COMPREHEN METABOLIC PANEL: CPT | Performed by: INTERNAL MEDICINE

## 2017-11-12 PROCEDURE — 74011250636 HC RX REV CODE- 250/636: Performed by: NURSE PRACTITIONER

## 2017-11-12 PROCEDURE — 74011000258 HC RX REV CODE- 258: Performed by: NURSE PRACTITIONER

## 2017-11-12 RX ORDER — SODIUM CHLORIDE 0.9 % (FLUSH) 0.9 %
10-40 SYRINGE (ML) INJECTION AS NEEDED
Status: DISCONTINUED | OUTPATIENT
Start: 2017-11-12 | End: 2017-11-16 | Stop reason: HOSPADM

## 2017-11-12 RX ADMIN — Medication 10 ML: at 10:00

## 2017-11-12 RX ADMIN — Medication 10 ML: at 10:35

## 2017-11-12 RX ADMIN — SODIUM CHLORIDE 500 MG: 900 INJECTION INTRAVENOUS at 10:00

## 2017-11-12 NOTE — PROGRESS NOTES
[facility] Progress Note    Date: 2017    Name: Zoya Gan    MRN: [id number]         : 1955    Solumedrol Infusion # 4 of 6    Ms. Raines to [facility], ambulatory, at 0900. Pt was assessed and education was provided. Patient denies complaints today. She reports she spoke with Semaj Jeffrey NP yesterday and received instructions on management of high blood sugars. Ms. Chuck Adams vitals were reviewed and patient was observed for 5 minutes prior to treatment. Visit Vitals    /67 (BP 1 Location: Left arm, BP Patient Position: Sitting)    Pulse 72    Temp 98.1 °F (36.7 °C)    Resp 18    SpO2 100%       Three attempts were made by this writer to place PIV in left arm (forearm x 2 and AC x 1) unsuccessfully. Was able to draw blood off of one to send to lab for CMP per orders. Guaze and coban placed to unsuccessful sites. 24 g PIV placed in left hand x one attempt by Colton Stanley from IV team. PIV flushed easily and had brisk blood return. Solumedrol 500 mg IV was infused over approximately 30 minutes. Patient denied complaints of itching, lip/tongue/facial swelling, SOB, CP or other complaints. Ms. Rachell Christy tolerated the infusion, and had no complaints. PIV flushed with NS 10 ml and removed. No bleeding or hematoma noted at site. Guaze and coban applied. Reviewed discharge instructions with patient, including expected side effects of insomnia and flushing. Patient armband removed and shredded. Ms. Rachell Christy was discharged from [place] [address] in stable condition at 200. She is to return to [facility] on 17 at 0930 for her next Solumedrol infusion.     Kristin Ye RN  2017

## 2017-11-13 ENCOUNTER — HOSPITAL ENCOUNTER (OUTPATIENT)
Dept: INFUSION THERAPY | Age: 62
Discharge: HOME | End: 2017-11-13
Payer: MEDICARE

## 2017-11-13 VITALS
OXYGEN SATURATION: 100 % | SYSTOLIC BLOOD PRESSURE: 136 MMHG | DIASTOLIC BLOOD PRESSURE: 58 MMHG | TEMPERATURE: 98.2 F | RESPIRATION RATE: 18 BRPM

## 2017-11-13 LAB
ALBUMIN SERPL-MCNC: 3 G/DL (ref 3.4–5)
ALBUMIN/GLOB SERPL: 0.7 {RATIO} (ref 0.8–1.7)
ALP SERPL-CCNC: 217 U/L (ref 45–117)
ALT SERPL-CCNC: 152 U/L (ref 13–56)
ANION GAP SERPL CALC-SCNC: 9 MMOL/L (ref 3–18)
AST SERPL-CCNC: 51 U/L (ref 15–37)
BILIRUB SERPL-MCNC: 0.2 MG/DL (ref 0.2–1)
BUN SERPL-MCNC: 44 MG/DL (ref 7–18)
BUN/CREAT SERPL: 27 (ref 12–20)
CALCIUM SERPL-MCNC: 8.4 MG/DL (ref 8.5–10.1)
CHLORIDE SERPL-SCNC: 107 MMOL/L (ref 100–108)
CO2 SERPL-SCNC: 26 MMOL/L (ref 21–32)
CREAT SERPL-MCNC: 1.61 MG/DL (ref 0.6–1.3)
GLOBULIN SER CALC-MCNC: 4.1 G/DL (ref 2–4)
GLUCOSE SERPL-MCNC: 58 MG/DL (ref 74–99)
POTASSIUM SERPL-SCNC: 4.1 MMOL/L (ref 3.5–5.5)
PROT SERPL-MCNC: 7.1 G/DL (ref 6.4–8.2)
SODIUM SERPL-SCNC: 142 MMOL/L (ref 136–145)

## 2017-11-13 PROCEDURE — 36415 COLL VENOUS BLD VENIPUNCTURE: CPT | Performed by: NURSE PRACTITIONER

## 2017-11-13 PROCEDURE — 80195 ASSAY OF SIROLIMUS: CPT | Performed by: NURSE PRACTITIONER

## 2017-11-13 PROCEDURE — 74011000258 HC RX REV CODE- 258: Performed by: NURSE PRACTITIONER

## 2017-11-13 PROCEDURE — 80053 COMPREHEN METABOLIC PANEL: CPT | Performed by: NURSE PRACTITIONER

## 2017-11-13 PROCEDURE — 96365 THER/PROPH/DIAG IV INF INIT: CPT

## 2017-11-13 PROCEDURE — 74011250636 HC RX REV CODE- 250/636: Performed by: NURSE PRACTITIONER

## 2017-11-13 RX ORDER — SODIUM CHLORIDE 0.9 % (FLUSH) 0.9 %
10 SYRINGE (ML) INJECTION AS NEEDED
Status: DISCONTINUED | OUTPATIENT
Start: 2017-11-13 | End: 2017-11-17 | Stop reason: HOSPADM

## 2017-11-13 RX ADMIN — Medication 10 ML: at 10:48

## 2017-11-13 RX ADMIN — SODIUM CHLORIDE 250 MG: 900 INJECTION, SOLUTION INTRAVENOUS at 10:05

## 2017-11-13 NOTE — PROGRESS NOTES
[facility] Progress Note    Date: 2017    Name: Dyllan Campos    MRN: [id number]         : 1955    Solumedrol Infusion # 5 of 6    Ms. Raines to [place], ambulatory, at 0940. Pt was assessed and education was provided. Patient denies complaints today. Ms. Fatoumata Foss vitals were reviewed and patient was observed for 5 minutes prior to treatment. Visit Vitals    /58 (BP 1 Location: Left arm, BP Patient Position: Sitting)    Temp 98.2 °F (36.8 °C)    Resp 18    SpO2 100%     22 g PIV placed in left hand x one attempt, secured with tegaderm, brisk blood return collected 1 vial of blood then positive return stopped, able to flush site,  Collected remained of vials from left antecubital using a 24 g butterfly needle, covered with 2x2 and coban, patient tolerated well. Recent Results (from the past 12 hour(s))   METABOLIC PANEL, COMPREHENSIVE    Collection Time: 17  9:45 AM   Result Value Ref Range    Sodium 142 136 - 145 mmol/L    Potassium 4.1 3.5 - 5.5 mmol/L    Chloride 107 100 - 108 mmol/L    CO2 26 21 - 32 mmol/L    Anion gap 9 3.0 - 18 mmol/L    Glucose 58 (L) 74 - 99 mg/dL    BUN 44 (H) 7.0 - 18 MG/DL    Creatinine 1.61 (H) 0.6 - 1.3 MG/DL    BUN/Creatinine ratio 27 (H) 12 - 20      GFR est AA 39 (L) >60 ml/min/1.73m2    GFR est non-AA 32 (L) >60 ml/min/1.73m2    Calcium 8.4 (L) 8.5 - 10.1 MG/DL    Bilirubin, total 0.2 0.2 - 1.0 MG/DL    ALT (SGPT) 152 (H) 13 - 56 U/L    AST (SGOT) 51 (H) 15 - 37 U/L    Alk. phosphatase 217 (H) 45 - 117 U/L    Protein, total 7.1 6.4 - 8.2 g/dL    Albumin 3.0 (L) 3.4 - 5.0 g/dL    Globulin 4.1 (H) 2.0 - 4.0 g/dL    A-G Ratio 0.7 (L) 0.8 - 1.7       ( see [facility] for remaining lab results)      Solumedrol 500 mg IV was infused over approximately 30 minutes. Patient denied complaints of itching, lip/tongue/facial swelling, SOB, CP or other complaints. Ms. Pattie Tyson tolerated the infusion, and had no complaints.     PIV flushed with NS 10 ml and removed. No bleeding or hematoma noted at site. Guaze and coban applied. Reviewed discharge instructions with patient, including expected side effects of insomnia and flushing. Patient armband removed and shredded. Ms. Jacki Aguirre was discharged from [place] [address] in stable condition at 1050. She is to return to [facility] on 11/14/17 at 1130 for her final Solumedrol infusion.     Adolfo Tobias RN  November 13, 2017

## 2017-11-14 ENCOUNTER — HOSPITAL ENCOUNTER (OUTPATIENT)
Dept: INFUSION THERAPY | Age: 62
Discharge: HOME | End: 2017-11-14
Payer: MEDICARE

## 2017-11-14 VITALS
DIASTOLIC BLOOD PRESSURE: 52 MMHG | RESPIRATION RATE: 18 BRPM | HEART RATE: 77 BPM | TEMPERATURE: 98.8 F | OXYGEN SATURATION: 100 % | SYSTOLIC BLOOD PRESSURE: 144 MMHG

## 2017-11-14 LAB
ALBUMIN SERPL-MCNC: 2.7 G/DL (ref 3.4–5)
ALBUMIN/GLOB SERPL: 0.6 {RATIO} (ref 0.8–1.7)
ALP SERPL-CCNC: 189 U/L (ref 45–117)
ALT SERPL-CCNC: 131 U/L (ref 13–56)
ANION GAP SERPL CALC-SCNC: 9 MMOL/L (ref 3–18)
AST SERPL-CCNC: 50 U/L (ref 15–37)
BILIRUB SERPL-MCNC: 0.3 MG/DL (ref 0.2–1)
BUN SERPL-MCNC: 37 MG/DL (ref 7–18)
BUN/CREAT SERPL: 20 (ref 12–20)
CALCIUM SERPL-MCNC: 8.2 MG/DL (ref 8.5–10.1)
CHLORIDE SERPL-SCNC: 104 MMOL/L (ref 100–108)
CO2 SERPL-SCNC: 27 MMOL/L (ref 21–32)
CREAT SERPL-MCNC: 1.85 MG/DL (ref 0.6–1.3)
GLOBULIN SER CALC-MCNC: 4.2 G/DL (ref 2–4)
GLUCOSE SERPL-MCNC: 117 MG/DL (ref 74–99)
POTASSIUM SERPL-SCNC: 3.7 MMOL/L (ref 3.5–5.5)
PROT SERPL-MCNC: 6.9 G/DL (ref 6.4–8.2)
SIROLIMUS BLD-MCNC: 4.4 NG/ML (ref 3–20)
SODIUM SERPL-SCNC: 140 MMOL/L (ref 136–145)

## 2017-11-14 PROCEDURE — 36415 COLL VENOUS BLD VENIPUNCTURE: CPT | Performed by: NURSE PRACTITIONER

## 2017-11-14 PROCEDURE — 74011250636 HC RX REV CODE- 250/636: Performed by: NURSE PRACTITIONER

## 2017-11-14 PROCEDURE — 80053 COMPREHEN METABOLIC PANEL: CPT | Performed by: NURSE PRACTITIONER

## 2017-11-14 PROCEDURE — 96374 THER/PROPH/DIAG INJ IV PUSH: CPT

## 2017-11-14 RX ORDER — SODIUM CHLORIDE 0.9 % (FLUSH) 0.9 %
10-40 SYRINGE (ML) INJECTION AS NEEDED
Status: DISCONTINUED | OUTPATIENT
Start: 2017-11-14 | End: 2017-11-18 | Stop reason: HOSPADM

## 2017-11-14 RX ADMIN — METHYLPREDNISOLONE SODIUM SUCCINATE 100 MG: 125 INJECTION, POWDER, FOR SOLUTION INTRAMUSCULAR; INTRAVENOUS at 11:59

## 2017-11-14 RX ADMIN — Medication 10 ML: at 12:03

## 2017-11-14 NOTE — PROGRESS NOTES
[facility] Progress Note    Date: 2017    Name: Julian Harman    MRN: [id number]         : 1955    Solumedrol    Ms. Raines arrived to [facility] at 1140. Ms. Yesica Jeffery was assessed and education was provided. Ms. Coral Moore vitals were reviewed. Visit Vitals    /52 (BP 1 Location: Left arm, BP Patient Position: Sitting)    Pulse 77    Temp 98.8 °F (37.1 °C)    Resp 18    SpO2 100%       24g IV inserted in patient's Left hand  X one attempt. Positive for blood return. CMP drawn and sent to lab as ordered. Flushes without difficulty. Recent Results (from the past 12 hour(s))   METABOLIC PANEL, COMPREHENSIVE    Collection Time: 17 11:45 AM   Result Value Ref Range    Sodium 140 136 - 145 mmol/L    Potassium 3.7 3.5 - 5.5 mmol/L    Chloride 104 100 - 108 mmol/L    CO2 27 21 - 32 mmol/L    Anion gap 9 3.0 - 18 mmol/L    Glucose 117 (H) 74 - 99 mg/dL    BUN 37 (H) 7.0 - 18 MG/DL    Creatinine 1.85 (H) 0.6 - 1.3 MG/DL    BUN/Creatinine ratio 20 12 - 20      GFR est AA 33 (L) >60 ml/min/1.73m2    GFR est non-AA 27 (L) >60 ml/min/1.73m2    Calcium 8.2 (L) 8.5 - 10.1 MG/DL    Bilirubin, total 0.3 0.2 - 1.0 MG/DL    ALT (SGPT) 131 (H) 13 - 56 U/L    AST (SGOT) 50 (H) 15 - 37 U/L    Alk. phosphatase 189 (H) 45 - 117 U/L    Protein, total 6.9 6.4 - 8.2 g/dL    Albumin 2.7 (L) 3.4 - 5.0 g/dL    Globulin 4.2 (H) 2.0 - 4.0 g/dL    A-G Ratio 0.6 (L) 0.8 - 1.7         100 mg solumedrol administered as ordered followed by NS flush. Ms. Yesica Jeffery tolerated well without complaints. IV removed/ intact. Gauze/ coban to site. Ms. Yesica Jeffery was discharged from [place] [address] in stable condition at 1210. She is to follow up with [facility]. Phillip Braun RN  2017

## 2017-11-15 ENCOUNTER — PATIENT OUTREACH (OUTPATIENT)
Dept: HEMATOLOGY | Age: 62
End: 2017-11-15

## 2017-11-15 DIAGNOSIS — T86.41 LIVER TRANSPLANT REJECTION (HCC): Primary | ICD-10-CM

## 2017-11-15 NOTE — PROGRESS NOTES
Received VM from patient who wanted to confirm she is to start Prednisone today. Return call placed to patient. Instructed to begin Prednisone 60 mg daily today. Reviewed labs with patient. Instructed to repeat labs on 11/20. Confirmed f/u appointment with Dr. Dottie Saleh on 11/22. Lab orders placed and faxed to [facility] ([phone]).

## 2017-11-22 ENCOUNTER — HOSPITAL ENCOUNTER (OUTPATIENT)
Dept: LAB | Age: 62
Discharge: HOME | End: 2017-11-22
Payer: MEDICARE

## 2017-11-22 ENCOUNTER — OFFICE VISIT (OUTPATIENT)
Dept: HEMATOLOGY | Age: 62
End: 2017-11-22

## 2017-11-22 ENCOUNTER — PATIENT OUTREACH (OUTPATIENT)
Dept: HEMATOLOGY | Age: 62
End: 2017-11-22

## 2017-11-22 VITALS
OXYGEN SATURATION: 97 % | TEMPERATURE: 98.6 F | DIASTOLIC BLOOD PRESSURE: 72 MMHG | SYSTOLIC BLOOD PRESSURE: 159 MMHG | BODY MASS INDEX: 30.57 KG/M2 | HEART RATE: 96 BPM | RESPIRATION RATE: 18 BRPM | WEIGHT: 183.5 LBS | HEIGHT: 65 IN

## 2017-11-22 DIAGNOSIS — Z94.4 LIVER REPLACED BY TRANSPLANT (HCC): ICD-10-CM

## 2017-11-22 DIAGNOSIS — T86.41 LIVER TRANSPLANT REJECTION (HCC): Primary | ICD-10-CM

## 2017-11-22 DIAGNOSIS — Z94.4 S/P LIVER TRANSPLANT (HCC): ICD-10-CM

## 2017-11-22 LAB
ALBUMIN SERPL-MCNC: 2.9 G/DL (ref 3.4–5)
ALBUMIN/GLOB SERPL: 0.8 {RATIO} (ref 0.8–1.7)
ALP SERPL-CCNC: 177 U/L (ref 45–117)
ALT SERPL-CCNC: 207 U/L (ref 13–56)
ANION GAP SERPL CALC-SCNC: 11 MMOL/L (ref 3–18)
AST SERPL-CCNC: 138 U/L (ref 15–37)
BILIRUB DIRECT SERPL-MCNC: 0.2 MG/DL (ref 0–0.2)
BILIRUB SERPL-MCNC: 0.5 MG/DL (ref 0.2–1)
BUN SERPL-MCNC: 32 MG/DL (ref 7–18)
BUN/CREAT SERPL: 17 (ref 12–20)
CALCIUM SERPL-MCNC: 8 MG/DL (ref 8.5–10.1)
CHLORIDE SERPL-SCNC: 101 MMOL/L (ref 100–108)
CO2 SERPL-SCNC: 26 MMOL/L (ref 21–32)
CREAT SERPL-MCNC: 1.85 MG/DL (ref 0.6–1.3)
ERYTHROCYTE [DISTWIDTH] IN BLOOD BY AUTOMATED COUNT: 14.9 % (ref 11.6–14.5)
GLOBULIN SER CALC-MCNC: 3.6 G/DL (ref 2–4)
GLUCOSE SERPL-MCNC: 321 MG/DL (ref 74–99)
HCT VFR BLD AUTO: 34.5 % (ref 35–45)
HGB BLD-MCNC: 11.4 G/DL (ref 12–16)
MCH RBC QN AUTO: 29.1 PG (ref 24–34)
MCHC RBC AUTO-ENTMCNC: 33 G/DL (ref 31–37)
MCV RBC AUTO: 88 FL (ref 74–97)
PLATELET # BLD AUTO: 230 K/UL (ref 135–420)
PMV BLD AUTO: 10.5 FL (ref 9.2–11.8)
POTASSIUM SERPL-SCNC: 3.7 MMOL/L (ref 3.5–5.5)
PROT SERPL-MCNC: 6.5 G/DL (ref 6.4–8.2)
RBC # BLD AUTO: 3.92 M/UL (ref 4.2–5.3)
SODIUM SERPL-SCNC: 138 MMOL/L (ref 136–145)
WBC # BLD AUTO: 9.3 K/UL (ref 4.6–13.2)

## 2017-11-22 PROCEDURE — 36415 COLL VENOUS BLD VENIPUNCTURE: CPT | Performed by: INTERNAL MEDICINE

## 2017-11-22 PROCEDURE — 80048 BASIC METABOLIC PNL TOTAL CA: CPT | Performed by: INTERNAL MEDICINE

## 2017-11-22 PROCEDURE — 85027 COMPLETE CBC AUTOMATED: CPT | Performed by: INTERNAL MEDICINE

## 2017-11-22 PROCEDURE — 80076 HEPATIC FUNCTION PANEL: CPT | Performed by: INTERNAL MEDICINE

## 2017-11-22 PROCEDURE — 80195 ASSAY OF SIROLIMUS: CPT | Performed by: INTERNAL MEDICINE

## 2017-11-22 NOTE — PROGRESS NOTES
Reviewed labs with Dr. Robert Veloz who was with patient for an office visit. Patient will continue Prednisone 60 mg daily and Sirolimus 2 mg daily. Cellcept dose will be increased to 1000 mg BID. Labs to be repeated on 11/27. Orders placed and faxed to [facility].

## 2017-11-22 NOTE — PROGRESS NOTES
[address] Jessee Ochoa MD, [address], [place], [place]       SILVA Pina PA-C Murrel Monas, MIKHAILP-BC   SILVA Baldwin NP        at [address]     [address] [place] [address], [address] [address]     [phone]     FAX: [phone]    at [address], [id number] [place] [address], [address]     [phone]     FAX: [phone]       Patient Care Team:  Luis Enrique Dave MD as PCP - General (Family Practice)  Simeon Maynard MD as Physician (Surgery)  Daija Almendarez MD as Physician (Plastic Surgery)  Kimberli Conway MD as Physician (Obstetrics & Gynecology)  Queenie Medina MD as Physician (Radiation Oncology)  Josette Red MD as Physician (Neurosurgery)  Za Bennett DDS as Physician ([address])  Kimberli Conway MD as Physician (Ophthalmology)  Tamara Carrion O.D. as Physician (Optometry)  Asiya Gould MD as Physician (Orthopedic Surgery)  Elisha Natarajan RN as Nurse Natali Barrera MD as Physician (Internal Medicine)  Renetta Duke MD (Gastroenterology)  Richard Torres MD (Internal Medicine)        Problem List  Date Reviewed: 11/6/2017          Codes Class Noted    Liver replaced by transplant [facility]) ICD-10-CM: Z94.4  ICD-9-CM: V42.7  11/13/2013        Encounter for long-term (current) use of other medications ICD-10-CM: Z79.899  ICD-9-CM: V58.69  31/04/8789        Complication of transplanted liver [facility]) ICD-10-CM: T86.40  ICD-9-CM: 996.82  11/13/2013        Back pain, lumbosacral ICD-10-CM: M54.5  ICD-9-CM: 724.2, 724.6  1/27/2013        S/P liver transplant ([facility] ICD-10-CM: Z94.4  ICD-9-CM: V42.7  5/28/2012    Overview Addendum 6/25/2013  9:48 AM by Kilo Hartman MD     4/2013             Diabetes mellitus ([facility].) ICD-10-CM: E11.9  ICD-9-CM: 250.00  5/28/2012        Colon polyps ICD-10-CM: K63.5  ICD-9-CM: 211.3  5/28/2012        Breast cancer ([facility] ICD-10-CM: C50.919  ICD-9-CM: 174.9  5/28/2012    Overview Signed 5/28/2012  7:00 AM by Gio Marsh MD     Masectomy, chemotherapy,XRT. 1996  Tamoxifen [id number]               H/O shoulder surgery ICD-10-CM: Z98.890  ICD-9-CM: V45.89  5/28/2012    Overview Signed 5/28/2012  7:07 AM by Gio Marsh MD     12/2011                   Jarred Butler returns to the [address] for management of liver allograft function, to adjust immune suppression. The active problem list, all pertinent past medical history, medications, liver histology, endoscopic studies, radiologic findings and laboratory findings related to the liver disorder were reviewed with the patient. The patient underwent liver transplantation at [place], [place] in 4/2013. The patient is currently receiving sirolimus for immune suppression. Ultrasound of the liver was performed in 11/2013. This suggests that the liver is normal.      The patient had an acute elevation in liver enzymes into the 200-300 range from normal in 10/2017. A liver was performed in 11/2017. This demonstrated acute rejction. The patient was treated with 3 gms of steroids over the days and then prednisone taper. The liver enzymes have come down into the 100-150 range. She is on prednisone 60 mg every day, sirolimus 3 mg every day and cellcept 2000 mg every day. The most recent laboratory studies indicate that the liver transaminases are grossly elevated in the 300's, alkaline phosphatase is elevated, tests of hepatic synthetic and metabolic function are normal, and the platelet count is normal.  BUN/CRT are elevated at 24/ 1.5    The patient has no symptoms which can be attributed to the liver disorder.     The patient has not experienced fatigue, pain in the right side over the liver,     The patient completes all daily activities without any functional limitations. ALLERGIES:  Allergies   Allergen Reactions    Tape [Adhesive] Other (comments)     Pulls skin off    Percocet [Oxycodone-Acetaminophen] Shortness of Breath, Itching and Other (comments)     \"Burning skin\"     Current Outpatient Prescriptions   Medication Sig Dispense Refill    sirolimus (RAPAMUNE) 1 mg tablet Take 3 mg by mouth daily.  mycophenolate (CELLCEPT) 500 mg tablet Take 2 Tabs by mouth two (2) times a day. 120 Tab 11    glimepiride (AMARYL) 4 mg tablet Take 4 mg by mouth daily.  alendronate (FOSAMAX) 70 mg tablet Take 1 Tab by mouth every seven (7) days. 12 Tab 3    LISDEXAMFETAMINE DIMESYLATE (VYVANSE PO) Take  by mouth.  metoprolol tartrate (LOPRESSOR) 25 mg tablet TAKE 1 TABLET (25 MG) BY ORAL ROUTE 2 TIMES PER DAY (GENERIC LOPRESSOR) 60 Tab 10    furosemide (LASIX) 40 mg tablet TAKE ONE TABLET BY MOUTH EVERY DAY 90 Tab 3    predniSONE (DELTASONE) 20 mg tablet Take 2 Tabs by mouth daily (with breakfast). 60 Tab 0    cyproheptadine (PERIACTIN) 4 mg tablet Take 1 Tab by mouth three (3) times daily as needed. Indications: Pruritus of Skin 90 Tab 0       SYSTEM REVIEW NOT RELATED TO LIVER DISEASE OR REVIEWED ABOVE:  Constitution systems: Weight gain with steroids. Eyes: Negative for visual changes. ENT: Negative for sore throat, painful swallowing. Respiratory: Negative for cough, hemoptysis, SOB. Cardiology: Negative for chest pain, palpitations. GI:  Negative for constipation or diarrhea. : Negative for urinary frequency, dysuria, hematuria, nocturia. Skin: Negative for rash. Hematology: Negative for easy bruising, blood clots. Musculo-skelatal: Negative for muscle pain, weakness. The back pain is improved with the high dose steroids. Neurologic: Negative for headaches, dizziness, vertigo, memory problems not related to HE. Psychology: Negative for anxiety, depression.      FAMILY HISTORY:  There is no family history of liver disease. SOCIAL HISTORY:  The patient is . There are 2 children and 2 grandchildren. The patient has never used tobacco products. The patient has never consumed significant amounts of alcohol. The patient used to work for [employer] and then as an  for [employer].  She has not worked since the liver transplant. PHYSICAL EXAMINATION:    Visit Vitals    /72 (BP 1 Location: Left arm, BP Patient Position: Sitting)    Pulse 96    Temp 98.6 °F (37 °C) (Tympanic)    Resp 18    Ht 5' 5\" (1.651 m)    Wt 183 lb 8 oz (83.2 kg)    SpO2 97%    BMI 30.54 kg/m2       General: Appears healthy. No acute distress. Eyes: Sclera anicteric. ENT: No oral lesions. Thyroid normal.  Nodes: No adenopathy. Skin: No spider angiomata. No jaundice. No palmar erythema. Respiratory: Lungs clear to auscultation. Cardiovascular: Regular heart rate. No murmurs. No JVD. Abdomen:   Well healed liver transplant incision. Soft non-tender. Liver size normal to percussion/palpation. Spleen not palpable. No obvious ascites. Extremities: No lower edema. No muscle wasting. No gross arthritic changes. Neurologic:  Alert and oriented. Cranial nerves grossly intact. No asterixsis. LAB STUDIES:  From 07/2017:  AST/ ALT/ ALP/ T. BILI/ ALB: 26/ 21/ 70/ 0.2/ 3.8  BUN/ CRT:  27/ 1.6    From 10/2017  AST/ALT/ALP/T Bili/ALB:  315/276/204/0.4/4.1    Liver [facility] Latest Ref Rng & Units 11/2/2017 10/27/2017   WBC 4.0 - 11.0 K/uL  4.0   ANC 1.8 - 7.7 K/uL     HGB 11.7 - 16.0 g/dL  11.4 (L)    - 440 K/uL  199   INR 0.89 - 1.29 0.90    AST 10 - 37 U/L 315 (H) 169 (H)   ALT 5 - 40 U/L 276 (H) 156 (H)   Alk Phos 40 - 120 U/L 201 (H) 144 (H)   Bili, Total 0.2 - 1.2 mg/dL 0.4 0.3   Bili, Direct 0.0 - 0.3 mg/dL  <0.2   Albumin 3.5 - 5.0 g/dL 4.1 3.9   BUN 6 - 22 mg/dL 24 (H) 31 (H)   Creat 0.8 - 1.4 mg/dL 1.5 (H) 1.6 (H)   Na 133 - 145 mmol/L 136 139   K 3.5 - 5.5 mmol/L 4.2 4.6   Cl 98 - 110 mmol/L 94 (L) 100   CO2 20 - 32 mmol/L 25 24   Glucose 70 - 99 mg/dL 116 (H) 158 (H)   Magnesium 1.6 - 2.5 mg/dL       From 11/2017  AST/ALT/ALP/T Bili/ALB:  125/190/189/0.5/3.6  BUN/CREAT:  20/1.5    SEROLOGIES:  2/2012. HAV total negative, HBsAntigen negative, anti-HBcore negative, anti-HBsurface negative, anti-HCV negative, Ferritin 20, NEVA negative, ASMA negative, AMA negative, ceruloplsmin 34, alpha-1-antitrypsin 195, A1AT phenotype MM.  2/2013. Ferritin 434, ASMA weak positive, CMV IgG positive, EBV IgG positive, anti-HIV negative, HBA1C 5.1    LIVER HISTOLOGY:  11/2017. Slides reviewed by MLS. Acute ayden rejection. ENDOSCOPIC PROCEDURES:  1/2012. EGD by Dr Tabitha Chandra. Esophageal varices. RADIOLOGY:  1/2012. CT scan abdomen with and without IV contrast.  Changes consistent with cirrhosis. No liver mass lesions. No dilated bile ducts. No bile duct strictures. Varices. Generalized ascites. 07/2012:  Ultrasound of the liver. Echogenic consistent with chronic liver disease, cirrhosis. No liver mass lesions. No ascites  11/2012: Ultrasound of the liver. Echogenic consistent with chronic liver disease, cirrhosis. No liver mass lesions. Small amount ascites present. 1/2013. Ultrasound of liver. Echogenic consistent with cirrhosis. No liver mass lesions. No dilated bile ducts. Mild ascites. 11/2013. Ultrasound of liver. Normal appearing liver. No liver mass lesions. 12/2013:  MRI of abdomen. Questionable small focal stenosis of distal common hepatic duct. Focal stenosis in mid-portal vein. OTHER TESTING:.   2/2013. ETOH negative. 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 This was treated with high dose prednisone which is now being tapered. She is now taking 60 mg prednisone.   The dose can be reduced to 40 mg and the by 10 mg every 2 weeks until 290 mg. She should not go below 20 mg every day until the lvier transaminases have been normal for a few weeks. Sirolimus dose is 3 mg every day. The sirolimus level was 8. Cellcept was increased to 1000 mg BID. Chronic renal insufficiency from immune suppression. Creatinine 1.5. Stable for now. The patient was counseled regarding alcohol use. Osteoporosis is being treated with fosmax. She had a DEXA scan in October 2015 that continued to show osteoporosis, but increased BMD.     Patient should receive annual flu-vaccine from their primary care provider. Live vaccines should not be administered. She states that she received her flu shot in September 2017. Screening for skin cancer due to chronic immunosuppression. She was seen by Dr. Lorenzo Barba in April 2016. No longer seeing this physician. She will find another dermatologist.     Laboratory studies should be performed every 4 weeks for now. [facility] 87 in 2 weeks.     Frank Chester MD  Liver [place] of [address] [place] [address], [address] [facility], [address] [phone]

## 2017-11-22 NOTE — MR AVS SNAPSHOT
Visit Information Date & Time Provider Department Dept. Phone Encounter #  
 11/22/2017 10:15 AM Liberty Castro MD [address] of  [address] [id number] Follow-up Instructions Return in about 4 weeks (around 12/20/2017) for MLS. Your Appointments 12/20/2017  1:45 PM  
Follow Up with Liberty Castro MD  
[address] [facility] ([facility]) Appt Note: 4wk f/up [facility], [address] [facility] [address]  
  
   
 [facility], [address] Upcoming Health Maintenance Date Due Hepatitis C Screening 1955 FOOT EXAM Q1 9/24/1963 MICROALBUMIN Q1 9/24/1963 EYE EXAM RETINAL OR DILATED Q1 9/24/1963 DTaP/Tdap/Td series (1 - Tdap) 9/24/1974 FOBT Q 1 YEAR AGE 50-75 9/24/2003 Pneumococcal 19-64 Highest Risk (2 of 3 - PCV13) 1/1/2013 ZOSTER VACCINE AGE 60> 7/24/2013 PAP AKA CERVICAL CYTOLOGY 10/18/2014 HEMOGLOBIN A1C Q6M 12/15/2016 LIPID PANEL Q1 1/11/2018 BREAST CANCER SCRN MAMMOGRAM 11/22/2018 Allergies as of 11/22/2017  Review Complete On: 11/22/2017 By: Irish Perez Severity Noted Reaction Type Reactions Tape [Adhesive] High 11/09/2017    Other (comments) Pulls skin off Percocet [Oxycodone-acetaminophen]  01/27/2013    Shortness of Breath, Itching, Other (comments) \"Burning skin\" Current Immunizations  Reviewed on 4/11/2013 Name Date Influenza Vaccine 1/21/2013 Pneumococcal Vaccine (Unspecified Type) 1/1/2012 Not reviewed this visit Vitals BP Pulse Temp Resp Height(growth percentile) Weight(growth percentile) 159/72 (BP 1 Location: Left arm, BP Patient Position: Sitting) 96 98.6 °F (37 °C) (Tympanic) 18 5' 5\" (1.651 m) 183 lb 8 oz (83.2 kg) SpO2 BMI OB Status Smoking Status 97% 30.54 kg/m2 Postmenopausal Never Smoker BMI and BSA Data Body Mass Index Body Surface Area 30.54 kg/m 2 1.95 m 2 Preferred Pharmacy Pharmacy Name Phone [id number] [place] [phone] Your Updated Medication List  
  
   
This list is accurate as of: 11/22/17 10:37 AM.  Always use your most recent med list.  
  
  
  
  
 alendronate 70 mg tablet Commonly known as:  FOSAMAX Take 1 Tab by mouth every seven (7) days. furosemide 40 mg tablet Commonly known as:  LASIX TAKE ONE TABLET BY MOUTH EVERY DAY  
  
 glimepiride 4 mg tablet Commonly known as:  AMARYL Take 4 mg by mouth daily. metoprolol tartrate 25 mg tablet Commonly known as:  LOPRESSOR  
TAKE 1 TABLET (25 MG) BY ORAL ROUTE 2 TIMES PER DAY (Andrade Lindsey) mycophenolate 500 mg tablet Commonly known as:  CELLCEPT Take 2 Tabs by mouth two (2) times a day. predniSONE 20 mg tablet Commonly known as:  Daniel Fossa Take 3 Tabs by mouth daily (with breakfast). sirolimus 1 mg tablet Commonly known as:  RAPAMUNE Take 2 mg by mouth daily. VYVANSE PO Take  by mouth. Follow-up Instructions Return in about 4 weeks (around 12/20/2017) for MLS. Introducing [facility] & HEALTH SERVICES! Obed Herndon introduces [employer] patient portal. Now you can access parts of your medical record, email your doctor's office, and request medication refills online. 1. In your internet browser, go to [email or web address] 2. Click on the First Time User? Click Here link in the Sign In box. You will see the New Member Sign Up page. 3. Enter your [employer] Access Code exactly as it appears below. You will not need to use this code after youve completed the sign-up process. If you do not sign up before the expiration date, you must request a new code. · [employer] Access Code: [email or web address] Expires: 2/4/2018 11:29 AM 
 
 4. Enter the last four digits of your Social Security Number (xxxx) and Date of Birth (mm/dd/yyyy) as indicated and click Submit. You will be taken to the next sign-up page. 5. Create a [employer] ID. This will be your [employer] login ID and cannot be changed, so think of one that is secure and easy to remember. 6. Create a [employer] password. You can change your password at any time. 7. Enter your Password Reset Question and Answer. This can be used at a later time if you forget your password. 8. Enter your e-mail address. You will receive e-mail notification when new information is available in [address] 9. Click Sign Up. You can now view and download portions of your medical record. 10. Click the Download Summary menu link to download a portable copy of your medical information. If you have questions, please visit the Frequently Asked Questions section of the [employer] website. Remember, [employer] is NOT to be used for urgent needs. For medical emergencies, dial 911. Now available from your iPhone and Android! Please provide this summary of care documentation to your next provider. Your primary care clinician is listed as Gerline Medico. If you have any questions after today's visit, please call [phone].

## 2017-11-25 LAB — SIROLIMUS BLD-MCNC: 5.8 NG/ML (ref 3–20)

## 2017-11-28 LAB
A-G RATIO,AGRAT: 1.2 RATIO (ref 1.1–2.6)
ALBUMIN SERPL-MCNC: 3.6 G/DL (ref 3.5–5)
ALP SERPL-CCNC: 189 U/L (ref 40–120)
ALT SERPL-CCNC: 190 U/L (ref 5–40)
ANION GAP SERPL CALC-SCNC: 15 MMOL/L
AST SERPL W P-5'-P-CCNC: 125 U/L (ref 10–37)
BILIRUB SERPL-MCNC: 0.5 MG/DL (ref 0.2–1.2)
BILIRUBIN, DIRECT,CBIL: 0.2 MG/DL (ref 0–0.3)
BUN SERPL-MCNC: 20 MG/DL (ref 6–22)
CALCIUM SERPL-MCNC: 8.9 MG/DL (ref 8.4–10.5)
CHLORIDE SERPL-SCNC: 96 MMOL/L (ref 98–110)
CO2 SERPL-SCNC: 24 MMOL/L (ref 20–32)
CREAT SERPL-MCNC: 1.5 MG/DL (ref 0.8–1.4)
ERYTHROCYTE [DISTWIDTH] IN BLOOD BY AUTOMATED COUNT: 14.9 % (ref 10–16)
GFRAA, 66117: 41.4
GFRNA, 66118: 34.2
GLOBULIN,GLOB: 2.9 G/DL (ref 2–4)
GLUCOSE SERPL-MCNC: 157 MG/DL (ref 70–99)
HCT VFR BLD AUTO: 40 % (ref 35.1–48)
HGB BLD-MCNC: 12.7 G/DL (ref 11.7–16)
MCH RBC QN AUTO: 29 PG (ref 26–34)
MCHC RBC AUTO-ENTMCNC: 32 G/DL (ref 32–36)
MCV RBC AUTO: 91 FL (ref 80–95)
PLATELET # BLD AUTO: 141 K/UL (ref 140–440)
PMV BLD AUTO: 10.8 FL (ref 6–10.8)
POTASSIUM SERPL-SCNC: 4.2 MMOL/L (ref 3.5–5.5)
PROT SERPL-MCNC: 6.5 G/DL (ref 6.2–8.1)
RAPAMYCIN (SIROLIMUS): 7.5 NG/ML (ref 3–20)
RBC # BLD AUTO: 4.4 M/UL (ref 3.8–5.2)
SODIUM SERPL-SCNC: 135 MMOL/L (ref 133–145)
WBC # BLD AUTO: 5.2 K/UL (ref 4–11)

## 2017-11-30 ENCOUNTER — PATIENT OUTREACH (OUTPATIENT)
Dept: HEMATOLOGY | Age: 62
End: 2017-11-30

## 2017-11-30 DIAGNOSIS — Z94.4 S/P LIVER TRANSPLANT (HCC): Primary | ICD-10-CM

## 2017-11-30 RX ORDER — CYPROHEPTADINE HYDROCHLORIDE 4 MG/1
4 TABLET ORAL
Qty: 90 TAB | Refills: 0 | Status: SHIPPED | OUTPATIENT
Start: 2017-11-30 | End: 2021-03-15 | Stop reason: ALTCHOICE

## 2017-11-30 NOTE — PROGRESS NOTES
Reviewed labs with Dr. Alex Tucker who recommended increasing Sirolimus to 3 mg daily. Phone call placed to patient. Reviewed labs and notified to increase Sirolimus dose. Advised to continue with weekly labs. Patient verbalized understanding. Standing orders placed and faxed to [facility].

## 2017-12-04 LAB
A-G RATIO,AGRAT: 1.3 RATIO (ref 1.1–2.6)
ALBUMIN SERPL-MCNC: 3.9 G/DL (ref 3.5–5)
ALP SERPL-CCNC: 126 U/L (ref 40–120)
ALT SERPL-CCNC: 64 U/L (ref 5–40)
ANION GAP SERPL CALC-SCNC: 17 MMOL/L
AST SERPL W P-5'-P-CCNC: 36 U/L (ref 10–37)
BILIRUB SERPL-MCNC: 0.3 MG/DL (ref 0.2–1.2)
BILIRUBIN, DIRECT,CBIL: <0.2 MG/DL (ref 0–0.3)
BUN SERPL-MCNC: 39 MG/DL (ref 6–22)
CALCIUM SERPL-MCNC: 9.1 MG/DL (ref 8.4–10.5)
CHLORIDE SERPL-SCNC: 99 MMOL/L (ref 98–110)
CO2 SERPL-SCNC: 26 MMOL/L (ref 20–32)
CREAT SERPL-MCNC: 1.7 MG/DL (ref 0.8–1.4)
ERYTHROCYTE [DISTWIDTH] IN BLOOD BY AUTOMATED COUNT: 14.2 % (ref 10–16)
GFRAA, 66117: 36.2
GFRNA, 66118: 29.9
GLOBULIN,GLOB: 2.9 G/DL (ref 2–4)
GLUCOSE SERPL-MCNC: 115 MG/DL (ref 70–99)
HCT VFR BLD AUTO: 39.7 % (ref 35.1–48)
HGB BLD-MCNC: 12.7 G/DL (ref 11.7–16)
MCH RBC QN AUTO: 29 PG (ref 26–34)
MCHC RBC AUTO-ENTMCNC: 32 G/DL (ref 32–36)
MCV RBC AUTO: 90 FL (ref 80–95)
PLATELET # BLD AUTO: 256 K/UL (ref 140–440)
PMV BLD AUTO: 10.5 FL (ref 6–10.8)
POTASSIUM SERPL-SCNC: 4.7 MMOL/L (ref 3.5–5.5)
PROT SERPL-MCNC: 6.8 G/DL (ref 6.2–8.1)
RAPAMYCIN (SIROLIMUS): 6.8 NG/ML (ref 3–20)
RBC # BLD AUTO: 4.42 M/UL (ref 3.8–5.2)
SODIUM SERPL-SCNC: 142 MMOL/L (ref 133–145)
WBC # BLD AUTO: 7.8 K/UL (ref 4–11)

## 2017-12-11 LAB
A-G RATIO,AGRAT: 1.3 RATIO (ref 1.1–2.6)
ALBUMIN SERPL-MCNC: 3.6 G/DL (ref 3.5–5)
ALP SERPL-CCNC: 105 U/L (ref 40–120)
ALT SERPL-CCNC: 53 U/L (ref 5–40)
ANION GAP SERPL CALC-SCNC: 20 MMOL/L
AST SERPL W P-5'-P-CCNC: 39 U/L (ref 10–37)
BILIRUB SERPL-MCNC: 0.2 MG/DL (ref 0.2–1.2)
BILIRUBIN, DIRECT,CBIL: <0.2 MG/DL (ref 0–0.3)
BUN SERPL-MCNC: 38 MG/DL (ref 6–22)
CALCIUM SERPL-MCNC: 8.8 MG/DL (ref 8.4–10.5)
CHLORIDE SERPL-SCNC: 100 MMOL/L (ref 98–110)
CO2 SERPL-SCNC: 23 MMOL/L (ref 20–32)
CREAT SERPL-MCNC: 1.7 MG/DL (ref 0.8–1.4)
ERYTHROCYTE [DISTWIDTH] IN BLOOD BY AUTOMATED COUNT: 13.8 % (ref 10–16)
GFRAA, 66117: 36.2
GFRNA, 66118: 29.9
GLOBULIN,GLOB: 2.8 G/DL (ref 2–4)
GLUCOSE SERPL-MCNC: 98 MG/DL (ref 70–99)
HCT VFR BLD AUTO: 37.2 % (ref 35.1–48)
HGB BLD-MCNC: 12.2 G/DL (ref 11.7–16)
MCH RBC QN AUTO: 29 PG (ref 26–34)
MCHC RBC AUTO-ENTMCNC: 33 G/DL (ref 32–36)
MCV RBC AUTO: 87 FL (ref 80–95)
PLATELET # BLD AUTO: 250 K/UL (ref 140–440)
PMV BLD AUTO: 10.4 FL (ref 6–10.8)
POTASSIUM SERPL-SCNC: 4 MMOL/L (ref 3.5–5.5)
PROT SERPL-MCNC: 6.4 G/DL (ref 6.2–8.1)
RAPAMYCIN (SIROLIMUS): 7.7 NG/ML (ref 3–20)
RBC # BLD AUTO: 4.28 M/UL (ref 3.8–5.2)
SODIUM SERPL-SCNC: 143 MMOL/L (ref 133–145)
WBC # BLD AUTO: 9.1 K/UL (ref 4–11)

## 2017-12-15 ENCOUNTER — TELEPHONE (OUTPATIENT)
Dept: HEMATOLOGY | Age: 62
End: 2017-12-15

## 2017-12-15 RX ORDER — PREDNISONE 20 MG/1
40 TABLET ORAL
Qty: 60 TAB | Refills: 0 | Status: SHIPPED | OUTPATIENT
Start: 2017-12-15 | End: 2018-03-05

## 2017-12-15 NOTE — TELEPHONE ENCOUNTER
Called patient, two identifiers verified, name and . Spoke with patient, informing her to decrease prednisone 60 mg to 40 mg daily per verbal MD order. Patient verbalized understanding. Verified medication refill of prednisone, Roselia Gomez RN will pend refill. Informed patient her labs were looking better and to continue to get weekly labs drawn. Patient verbalized understanding.      Glenna Hinson LPN

## 2017-12-18 LAB
A-G RATIO,AGRAT: 1.2 RATIO (ref 1.1–2.6)
ALBUMIN SERPL-MCNC: 3.3 G/DL (ref 3.5–5)
ALP SERPL-CCNC: 92 U/L (ref 40–120)
ALT SERPL-CCNC: 36 U/L (ref 5–40)
ANION GAP SERPL CALC-SCNC: 13 MMOL/L
AST SERPL W P-5'-P-CCNC: 25 U/L (ref 10–37)
BILIRUB SERPL-MCNC: 0.1 MG/DL (ref 0.2–1.2)
BILIRUBIN, DIRECT,CBIL: <0.2 MG/DL (ref 0–0.3)
BUN SERPL-MCNC: 37 MG/DL (ref 6–22)
CALCIUM SERPL-MCNC: 8.6 MG/DL (ref 8.4–10.5)
CHLORIDE SERPL-SCNC: 100 MMOL/L (ref 98–110)
CO2 SERPL-SCNC: 28 MMOL/L (ref 20–32)
CREAT SERPL-MCNC: 1.8 MG/DL (ref 0.8–1.4)
ERYTHROCYTE [DISTWIDTH] IN BLOOD BY AUTOMATED COUNT: 13.8 % (ref 10–16)
GFRAA, 66117: 33.9
GFRNA, 66118: 28
GLOBULIN,GLOB: 2.7 G/DL (ref 2–4)
GLUCOSE SERPL-MCNC: 175 MG/DL (ref 70–99)
HCT VFR BLD AUTO: 37.4 % (ref 35.1–48)
HGB BLD-MCNC: 12.2 G/DL (ref 11.7–16)
MCH RBC QN AUTO: 29 PG (ref 26–34)
MCHC RBC AUTO-ENTMCNC: 33 G/DL (ref 32–36)
MCV RBC AUTO: 88 FL (ref 80–95)
PLATELET # BLD AUTO: 184 K/UL (ref 140–440)
PMV BLD AUTO: 10.8 FL (ref 6–10.8)
POTASSIUM SERPL-SCNC: 3.7 MMOL/L (ref 3.5–5.5)
PROT SERPL-MCNC: 6 G/DL (ref 6.2–8.1)
RAPAMYCIN (SIROLIMUS): 8 NG/ML (ref 3–20)
RBC # BLD AUTO: 4.24 M/UL (ref 3.8–5.2)
SODIUM SERPL-SCNC: 141 MMOL/L (ref 133–145)
WBC # BLD AUTO: 8.9 K/UL (ref 4–11)

## 2017-12-20 ENCOUNTER — OFFICE VISIT (OUTPATIENT)
Dept: HEMATOLOGY | Age: 62
End: 2017-12-20

## 2017-12-20 VITALS
TEMPERATURE: 98.6 F | WEIGHT: 185 LBS | HEART RATE: 102 BPM | BODY MASS INDEX: 30.82 KG/M2 | RESPIRATION RATE: 18 BRPM | SYSTOLIC BLOOD PRESSURE: 141 MMHG | HEIGHT: 65 IN | DIASTOLIC BLOOD PRESSURE: 71 MMHG | OXYGEN SATURATION: 97 %

## 2017-12-20 DIAGNOSIS — T86.41 LIVER TRANSPLANT REJECTION (HCC): Primary | ICD-10-CM

## 2017-12-20 DIAGNOSIS — Z94.4 S/P LIVER TRANSPLANT (HCC): ICD-10-CM

## 2017-12-20 DIAGNOSIS — Z94.4 LIVER REPLACED BY TRANSPLANT (HCC): ICD-10-CM

## 2017-12-20 NOTE — MR AVS SNAPSHOT
Visit Information Date & Time Provider Department Dept. Phone Encounter #  
 12/20/2017  1:45 PM Najma Byrne MD [place] [id number] of [facility] [phone] Follow-up Instructions Return in about 4 weeks (around 1/17/2018) for MLS. Upcoming Health Maintenance Date Due Hepatitis C Screening 1955 FOOT EXAM Q1 9/24/1963 MICROALBUMIN Q1 9/24/1963 EYE EXAM RETINAL OR DILATED Q1 9/24/1963 DTaP/Tdap/Td series (1 - Tdap) 9/24/1974 FOBT Q 1 YEAR AGE 50-75 9/24/2003 Pneumococcal 19-64 Highest Risk (2 of 3 - PCV13) 1/1/2013 ZOSTER VACCINE AGE 60> 7/24/2013 PAP AKA CERVICAL CYTOLOGY 10/18/2014 HEMOGLOBIN A1C Q6M 12/15/2016 LIPID PANEL Q1 1/11/2018 Allergies as of 12/20/2017  Review Complete On: 12/20/2017 By: Kerry Perez Severity Noted Reaction Type Reactions Tape [Adhesive] High 11/09/2017    Other (comments) Pulls skin off Percocet [Oxycodone-acetaminophen]  01/27/2013    Shortness of Breath, Itching, Other (comments) \"Burning skin\" Current Immunizations  Reviewed on 4/11/2013 Name Date Influenza Vaccine 1/21/2013 Pneumococcal Vaccine (Unspecified Type) 1/1/2012 Not reviewed this visit Vitals BP Pulse Temp Resp Height(growth percentile) Weight(growth percentile) 141/71 (BP 1 Location: Right arm, BP Patient Position: Sitting) (!) 102 98.6 °F (37 °C) (Tympanic) 18 5' 5\" (1.651 m) 185 lb (83.9 kg) SpO2 BMI OB Status Smoking Status 97% 30.79 kg/m2 Postmenopausal Never Smoker Vitals History BMI and BSA Data Body Mass Index Body Surface Area 30.79 kg/m 2 1.96 m 2 Preferred Pharmacy Pharmacy Name Phone [id number] [place] , [address] [place] [phone] Your Updated Medication List  
  
   
This list is accurate as of: 12/20/17  2:25 PM.  Always use your most recent med list.  
  
  
  
  
 alendronate 70 mg tablet Commonly known as:  FOSAMAX Take 1 Tab by mouth every seven (7) days. cyproheptadine 4 mg tablet Commonly known as:  PERIACTIN Take 1 Tab by mouth three (3) times daily as needed. Indications: Pruritus of Skin  
  
 furosemide 40 mg tablet Commonly known as:  LASIX TAKE ONE TABLET BY MOUTH EVERY DAY  
  
 glimepiride 4 mg tablet Commonly known as:  AMARYL Take 4 mg by mouth daily. metoprolol tartrate 25 mg tablet Commonly known as:  LOPRESSOR  
TAKE 1 TABLET (25 MG) BY ORAL ROUTE 2 TIMES PER DAY (Mar Riccardo) mycophenolate 500 mg tablet Commonly known as:  CELLCEPT Take 2 Tabs by mouth two (2) times a day. predniSONE 20 mg tablet Commonly known as:  Kenya Beam Take 2 Tabs by mouth daily (with breakfast). sirolimus 1 mg tablet Commonly known as:  RAPAMUNE Take 3 mg by mouth daily. VYVANSE PO Take  by mouth. Follow-up Instructions Return in about 4 weeks (around 1/17/2018) for MLS. Introducing hospitals & HEALTH SERVICES! [facility] introduces [employer] patient portal. Now you can access parts of your medical record, email your doctor's office, and request medication refills online. 1. In your internet browser, go to [email or web address] 2. Click on the First Time User? Click Here link in the Sign In box. You will see the New Member Sign Up page. 3. Enter your [employer] Access Code exactly as it appears below. You will not need to use this code after youve completed the sign-up process. If you do not sign up before the expiration date, you must request a new code. · [employer] Access Code: [email or web address] Expires: 2/4/2018 11:29 AM 
 
4. Enter the last four digits of your Social Security Number (xxxx) and Date of Birth (mm/dd/yyyy) as indicated and click Submit. You will be taken to the next sign-up page. 5. Create a [email or web address] ID.  This will be your [employer] login ID and cannot be changed, so think of one that is secure and easy to remember. 6. Create a [employer] password. You can change your password at any time. 7. Enter your Password Reset Question and Answer. This can be used at a later time if you forget your password. 8. Enter your e-mail address. You will receive e-mail notification when new information is available in [address] 9. Click Sign Up. You can now view and download portions of your medical record. 10. Click the Download Summary menu link to download a portable copy of your medical information. If you have questions, please visit the Frequently Asked Questions section of the [employer] website. Remember, [employer] is NOT to be used for urgent needs. For medical emergencies, dial 911. Now available from your iPhone and Android! Please provide this summary of care documentation to your next provider. Your primary care clinician is listed as Favian Tabares. If you have any questions after today's visit, please call [phone].

## 2017-12-20 NOTE — PROGRESS NOTES
[address] Jes[address] Ochoa MD, [address], [facility], [place]       SILVA Servin PA-C Francoise Milian, MIKHAILP-BC   SILVA Virk NP        at [address], [address] [facility], [address]     [phone]     FAX: [phone]    at [address], [id number] [place] [address], [address]     [phone]     FAX: [phone]       Patient Care Team:  Joon Vivas MD as PCP - General (Family Practice)  Fara Valladares MD as Physician (Surgery)  Luci Cazares MD as Physician (Plastic Surgery)  Lavon Rebolledo MD as Physician (Obstetrics & Gynecology)  Jurgen Marcus MD as Physician (Radiation Oncology)  Itz Ng MD as Physician (Neurosurgery)  Alysia Chaparro DDS as Physician ([address])  Lavon Rebolledo MD as Physician (Ophthalmology)  Jhoan Fatima O.D. as Physician (Optometry)  Shahrzad Padron MD as Physician (Orthopedic Surgery)  Alex Abbott RN as Nurse Faby Malik MD as Physician (Internal Medicine)  Dylan Javed MD (Gastroenterology)  Benja Overton MD (Internal Medicine)        Problem List  Date Reviewed: 12/20/2017          Codes Class Noted    Liver replaced by transplant [facility]) ICD-10-CM: Z94.4  ICD-9-CM: V42.7  11/13/2013        Encounter for long-term (current) use of other medications ICD-10-CM: Z79.899  ICD-9-CM: V58.69  47/83/9014        Complication of transplanted liver [facility]) ICD-10-CM: T86.40  ICD-9-CM: 996.82  11/13/2013        Back pain, lumbosacral ICD-10-CM: M54.5  ICD-9-CM: 724.2, 724.6  1/27/2013        [facility] ICD-10-CM: Z94.4  ICD-9-CM: V42.7  5/28/2012    Overview Addendum 6/25/2013  9:48 AM by Yesy Guzman MD     4/2013             Diabetes mellitus ([facility] ICD-10-CM: E11.9  ICD-9-CM: 250.00  5/28/2012        Colon polyps ICD-10-CM: K63.5  ICD-9-CM: 211.3  5/28/2012        Breast cancer ([facility] ICD-10-CM: C50.919  ICD-9-CM: 174.9  5/28/2012    Overview Signed 5/28/2012  7:00 AM by Gio Marsh MD     Masectomy, chemotherapy,XRT. 1996  Tamoxifen [id number]               H/O shoulder surgery ICD-10-CM: Z98.890  ICD-9-CM: V45.89  5/28/2012    Overview Signed 5/28/2012  7:07 AM by Gio Marsh MD     12/2011                   Jarred Butler returns to the [address] for management of liver allograft function, to adjust immune suppression. The active problem list, all pertinent past medical history, medications, liver histology, endoscopic studies, radiologic findings and laboratory findings related to the liver disorder were reviewed with the patient. The patient underwent liver transplantation at [place], [place] in 4/2013. The patient is currently receiving sirolimus for immune suppression. Ultrasound of the liver was performed in 11/2013. This suggests that the liver is normal.      The patient had an acute graft rejection in 10/2017. The patient was treated with 3 gms of steroids over 3 days and then prednisone taper. She is currently taking prednisone 40 mg every day. The sirolimus dose is 3 mg BID. The dose of cellcept was increased to 1000 BID. The liver enzymes are now normal.      The Screat is 1.8 and stable. The patient has no symptoms which can be attributed to the liver disorder. The patient has not experienced fatigue, pain in the right side over the liver,     The patient completes all daily activities without any functional limitations.       ALLERGIES:  Allergies   Allergen Reactions    Tape [Adhesive] Other (comments)     Pulls skin off    Percocet [Oxycodone-Acetaminophen] Shortness of Breath, Itching and Other (comments)     \"Burning skin\"     Current Outpatient Prescriptions   Medication Sig Dispense Refill    predniSONE (DELTASONE) 20 mg tablet Take 2 Tabs by mouth daily (with breakfast). 60 Tab 0    cyproheptadine (PERIACTIN) 4 mg tablet Take 1 Tab by mouth three (3) times daily as needed. Indications: Pruritus of Skin 90 Tab 0    sirolimus (RAPAMUNE) 1 mg tablet Take 3 mg by mouth daily.  mycophenolate (CELLCEPT) 500 mg tablet Take 2 Tabs by mouth two (2) times a day. 120 Tab 11    glimepiride (AMARYL) 4 mg tablet Take 4 mg by mouth daily.  alendronate (FOSAMAX) 70 mg tablet Take 1 Tab by mouth every seven (7) days. 12 Tab 3    LISDEXAMFETAMINE DIMESYLATE (VYVANSE PO) Take  by mouth.  metoprolol tartrate (LOPRESSOR) 25 mg tablet TAKE 1 TABLET (25 MG) BY ORAL ROUTE 2 TIMES PER DAY (GENERIC LOPRESSOR) 60 Tab 10    furosemide (LASIX) 40 mg tablet TAKE ONE TABLET BY MOUTH EVERY DAY 90 Tab 3       SYSTEM REVIEW NOT RELATED TO LIVER DISEASE OR REVIEWED ABOVE:  Constitution systems: Weight gain with steroids. Eyes: Negative for visual changes. ENT: Negative for sore throat, painful swallowing. Respiratory: Negative for cough, hemoptysis, SOB. Cardiology: Negative for chest pain, palpitations. GI:  Negative for constipation or diarrhea. : Negative for urinary frequency, dysuria, hematuria, nocturia. Skin: Negative for rash. Hematology: Negative for easy bruising, blood clots. Musculo-skelatal: Negative for muscle pain, weakness. The back pain is improved with the high dose steroids. Neurologic: Negative for headaches, dizziness, vertigo, memory problems not related to HE. Psychology: Negative for anxiety, depression. FAMILY HISTORY:  There is no family history of liver disease. SOCIAL HISTORY:  The patient is . There are 2 children and 2 grandchildren. The patient has never used tobacco products. The patient has never consumed significant amounts of alcohol.     The patient used to work for [employer] and then as an  for [employer]. She has not worked since the liver transplant. PHYSICAL EXAMINATION:    Visit Vitals    /71 (BP 1 Location: Right arm, BP Patient Position: Sitting)    Pulse (!) 102    Temp 98.6 °F (37 °C) (Tympanic)    Resp 18    Ht 5' 5\" (1.651 m)    Wt 185 lb (83.9 kg)    SpO2 97%    BMI 30.79 kg/m2       General: Appears healthy. No acute distress. Eyes: Sclera anicteric. ENT: No oral lesions. Thyroid normal.  Nodes: No adenopathy. Skin: No spider angiomata. No jaundice. No palmar erythema. Respiratory: Lungs clear to auscultation. Cardiovascular: Regular heart rate. No murmurs. No JVD. Abdomen:   Well healed liver transplant incision. Soft non-tender. Liver size normal to percussion/palpation. Spleen not palpable. No obvious ascites. Extremities: No lower edema. No muscle wasting. No gross arthritic changes. Neurologic:  Alert and oriented. Cranial nerves grossly intact. No asterixsis. LAB STUDIES:  From 07/2017:  AST/ ALT/ ALP/ T. BILI/ ALB: 26/ 21/ 70/ 0.2/ 3.8  BUN/ CRT:  27/ 1.6    From 10/2017  AST/ALT/ALP/T Bili/ALB:  315/276/204/0.4/4.1    Liver [facility] Latest Ref Rng & Units 11/2/2017 10/27/2017   WBC 4.0 - 11.0 K/uL  4.0   ANC 1.8 - 7.7 K/uL     HGB 11.7 - 16.0 g/dL  11.4 (L)    - 440 K/uL  199   INR 0.89 - 1.29 0.90    AST 10 - 37 U/L 315 (H) 169 (H)   ALT 5 - 40 U/L 276 (H) 156 (H)   Alk Phos 40 - 120 U/L 201 (H) 144 (H)   Bili, Total 0.2 - 1.2 mg/dL 0.4 0.3   Bili, Direct 0.0 - 0.3 mg/dL  <0.2   Albumin 3.5 - 5.0 g/dL 4.1 3.9   BUN 6 - 22 mg/dL 24 (H) 31 (H)   Creat 0.8 - 1.4 mg/dL 1.5 (H) 1.6 (H)   Na 133 - 145 mmol/L 136 139   K 3.5 - 5.5 mmol/L 4.2 4.6   Cl 98 - 110 mmol/L 94 (L) 100   CO2 20 - 32 mmol/L 25 24   Glucose 70 - 99 mg/dL 116 (H) 158 (H)   Magnesium 1.6 - 2.5 mg/dL       From 11/2017  AST/ALT/ALP/T Bili/ALB:  125/190/189/0.5/3.6  BUN/CREAT:  20/1.5    From 12/2017  AST/ALT/ALP/T Bili/ALB:  25/36/92/0.1/3.3  BUN/CREAT: 37/1.8      SEROLOGIES:  2/2012. HAV total negative, HBsAntigen negative, anti-HBcore negative, anti-HBsurface negative, anti-HCV negative, Ferritin 20, NEVA negative, ASMA negative, AMA negative, ceruloplsmin 34, alpha-1-antitrypsin 195, A1AT phenotype MM.  2/2013. Ferritin 434, ASMA weak positive, CMV IgG positive, EBV IgG positive, anti-HIV negative, HBA1C 5.1    LIVER HISTOLOGY:  11/2017. Slides reviewed by MLS. Acute ayden rejection. ENDOSCOPIC PROCEDURES:  1/2012. EGD by Dr Suman Perez. Esophageal varices. RADIOLOGY:  1/2012. CT scan abdomen with and without IV contrast.  Changes consistent with cirrhosis. No liver mass lesions. No dilated bile ducts. No bile duct strictures. Varices. Generalized ascites. 07/2012:  Ultrasound of the liver. Echogenic consistent with chronic liver disease, cirrhosis. No liver mass lesions. No ascites  11/2012: Ultrasound of the liver. Echogenic consistent with chronic liver disease, cirrhosis. No liver mass lesions. Small amount ascites present. 1/2013. Ultrasound of liver. Echogenic consistent with cirrhosis. No liver mass lesions. No dilated bile ducts. Mild ascites. 11/2013. Ultrasound of liver. Normal appearing liver. No liver mass lesions. 12/2013:  MRI of abdomen. Questionable small focal stenosis of distal common hepatic duct. Focal stenosis in mid-portal vein. OTHER TESTING:.   2/2013. ETOH negative. 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 This was treated with high dose prednisone which is now being tapered. She is now taking 40 mg prednisone. The dose can be reduced to 20 mg for the next 1 month and then taper by 5 mg every 2 weeks. Sirolimus dose is 3 mg every day. The sirolimus level was 8. Cellcept was increased to 1000 mg BID. Chronic renal insufficiency from immune suppression. Creatinine 1.5.   Stable for now. The patient was counseled regarding alcohol use. Osteoporosis is being treated with fosmax. She had a DEXA scan in October 2015 that continued to show osteoporosis, but increased BMD.     Patient should receive annual flu-vaccine from their primary care provider. Live vaccines should not be administered. She states that she received her flu shot in September 2017. Screening for skin cancer due to chronic immunosuppression. She was seen by Dr. Leila Romero in April 2016. No longer seeing this physician. She will find another dermatologist.     Laboratory studies should be performed every 4 weeks for now. [facility] 87 in 4 weeks.     Olivia Leger MD  Liver [place] [facility] [address], [address] [place] [address], [address] [place] [address] Street - [address] [phone]

## 2017-12-20 NOTE — PROGRESS NOTES
Jarred Butler is a 57 y.o. female    No chief complaint on file. 1. Have you been to the ER, urgent care clinic or hospitalized since your last visit? NO.     2. Have you seen or consulted any other health care providers outside of the [address] [place] [address] since your last visit (Include any pap smears or colon screening)?  YES  Patient went to hematologist/oncologist at   Learning Assessment 7/20/2016   PRIMARY LEARNER Patient   PRIMARY LANGUAGE ENGLISH   LEARNER PREFERENCE PRIMARY DEMONSTRATION   ANSWERED BY self   RELATIONSHIP SELF

## 2017-12-26 LAB
A-G RATIO,AGRAT: 1.3 RATIO (ref 1.1–2.6)
ALBUMIN SERPL-MCNC: 3.4 G/DL (ref 3.5–5)
ALP SERPL-CCNC: 111 U/L (ref 40–120)
ALT SERPL-CCNC: 49 U/L (ref 5–40)
ANION GAP SERPL CALC-SCNC: 14 MMOL/L
AST SERPL W P-5'-P-CCNC: 28 U/L (ref 10–37)
BILIRUB SERPL-MCNC: 0.2 MG/DL (ref 0.2–1.2)
BILIRUBIN, DIRECT,CBIL: <0.2 MG/DL (ref 0–0.3)
BUN SERPL-MCNC: 21 MG/DL (ref 6–22)
CALCIUM SERPL-MCNC: 8.2 MG/DL (ref 8.4–10.5)
CHLORIDE SERPL-SCNC: 102 MMOL/L (ref 98–110)
CO2 SERPL-SCNC: 26 MMOL/L (ref 20–32)
CREAT SERPL-MCNC: 1.6 MG/DL (ref 0.8–1.4)
ERYTHROCYTE [DISTWIDTH] IN BLOOD BY AUTOMATED COUNT: 13.5 % (ref 10–16)
GFRAA, 66117: 38.5
GFRNA, 66118: 31.8
GLOBULIN,GLOB: 2.6 G/DL (ref 2–4)
GLUCOSE SERPL-MCNC: 153 MG/DL (ref 70–99)
HCT VFR BLD AUTO: 35.7 % (ref 35.1–48)
HGB BLD-MCNC: 11.6 G/DL (ref 11.7–16)
MCH RBC QN AUTO: 29 PG (ref 26–34)
MCHC RBC AUTO-ENTMCNC: 33 G/DL (ref 32–36)
MCV RBC AUTO: 88 FL (ref 80–95)
PLATELET # BLD AUTO: 194 K/UL (ref 140–440)
PMV BLD AUTO: 10.4 FL (ref 6–10.8)
POTASSIUM SERPL-SCNC: 4.1 MMOL/L (ref 3.5–5.5)
PROT SERPL-MCNC: 6 G/DL (ref 6.2–8.1)
RAPAMYCIN (SIROLIMUS): 7.6 NG/ML (ref 3–20)
RBC # BLD AUTO: 4.05 M/UL (ref 3.8–5.2)
SODIUM SERPL-SCNC: 142 MMOL/L (ref 133–145)
WBC # BLD AUTO: 6.9 K/UL (ref 4–11)

## 2017-12-29 ENCOUNTER — PATIENT OUTREACH (OUTPATIENT)
Dept: HEMATOLOGY | Age: 62
End: 2017-12-29

## 2018-01-02 LAB
A-G RATIO,AGRAT: 1.2 RATIO (ref 1.1–2.6)
ALBUMIN SERPL-MCNC: 3.6 G/DL (ref 3.5–5)
ALP SERPL-CCNC: 107 U/L (ref 40–120)
ALT SERPL-CCNC: 33 U/L (ref 5–40)
ANION GAP SERPL CALC-SCNC: 17 MMOL/L
AST SERPL W P-5'-P-CCNC: 25 U/L (ref 10–37)
BILIRUB SERPL-MCNC: 0.2 MG/DL (ref 0.2–1.2)
BILIRUBIN, DIRECT,CBIL: <0.2 MG/DL (ref 0–0.3)
BUN SERPL-MCNC: 23 MG/DL (ref 6–22)
CALCIUM SERPL-MCNC: 8.6 MG/DL (ref 8.4–10.5)
CHLORIDE SERPL-SCNC: 99 MMOL/L (ref 98–110)
CO2 SERPL-SCNC: 25 MMOL/L (ref 20–32)
CREAT SERPL-MCNC: 1.6 MG/DL (ref 0.8–1.4)
ERYTHROCYTE [DISTWIDTH] IN BLOOD BY AUTOMATED COUNT: 13.8 % (ref 10–16)
GFRAA, 66117: 38.5
GFRNA, 66118: 31.8
GLOBULIN,GLOB: 2.9 G/DL (ref 2–4)
GLUCOSE SERPL-MCNC: 148 MG/DL (ref 70–99)
HCT VFR BLD AUTO: 39.1 % (ref 35.1–48)
HGB BLD-MCNC: 12.6 G/DL (ref 11.7–16)
MCH RBC QN AUTO: 29 PG (ref 26–34)
MCHC RBC AUTO-ENTMCNC: 32 G/DL (ref 32–36)
MCV RBC AUTO: 89 FL (ref 80–95)
PLATELET # BLD AUTO: 252 K/UL (ref 140–440)
PMV BLD AUTO: 10.6 FL (ref 6–10.8)
POTASSIUM SERPL-SCNC: 3.9 MMOL/L (ref 3.5–5.5)
PROT SERPL-MCNC: 6.5 G/DL (ref 6.2–8.1)
RBC # BLD AUTO: 4.39 M/UL (ref 3.8–5.2)
SODIUM SERPL-SCNC: 141 MMOL/L (ref 133–145)
WBC # BLD AUTO: 6.1 K/UL (ref 4–11)

## 2018-01-03 LAB — RAPAMYCIN (SIROLIMUS): 7.4 NG/ML (ref 3–20)

## 2018-01-04 NOTE — PROGRESS NOTES
Reviewed labs with Dr. Oscar Arredondo. Phone call placed to patient. Reviewed most recent labs. Patient notified to continue Prednisone 20 mg daily for now. Repeat labs on 1/2/18 as planned. Patient verbalized understanding.

## 2018-01-10 RX ORDER — METOPROLOL TARTRATE 25 MG/1
25 TABLET, FILM COATED ORAL 2 TIMES DAILY
Qty: 180 TAB | Refills: 3 | Status: ON HOLD | OUTPATIENT
Start: 2018-01-10 | End: 2018-06-25 | Stop reason: DRUGHIGH

## 2018-01-10 NOTE — TELEPHONE ENCOUNTER
Received VM from patient requesting refill of Metoprolol. Patient reports pharmacy has attempted to obtain refill authorization without success. She reports Luis Alfredo Loya NP was the last prescriber of the medication. Noted refill request routed to NP who is no longer with the practice. New prescription e-scribed to pharmacy.

## 2018-01-15 LAB
ERYTHROCYTE [DISTWIDTH] IN BLOOD BY AUTOMATED COUNT: 13.5 % (ref 10–16)
HCT VFR BLD AUTO: 38.4 % (ref 35.1–48)
HGB BLD-MCNC: 12.5 G/DL (ref 11.7–16)
MCH RBC QN AUTO: 28 PG (ref 26–34)
MCHC RBC AUTO-ENTMCNC: 33 G/DL (ref 32–36)
MCV RBC AUTO: 87 FL (ref 80–95)
PLATELET # BLD AUTO: 217 K/UL (ref 140–440)
PMV BLD AUTO: 10.8 FL (ref 6–10.8)
RBC # BLD AUTO: 4.43 M/UL (ref 3.8–5.2)
WBC # BLD AUTO: 8.6 K/UL (ref 4–11)

## 2018-01-16 LAB
A-G RATIO,AGRAT: 1.6 RATIO (ref 1.1–2.6)
ALBUMIN SERPL-MCNC: 3.9 G/DL (ref 3.5–5)
ALP SERPL-CCNC: 119 U/L (ref 40–120)
ALT SERPL-CCNC: 34 U/L (ref 5–40)
ANION GAP SERPL CALC-SCNC: 16 MMOL/L
AST SERPL W P-5'-P-CCNC: 27 U/L (ref 10–37)
BILIRUB SERPL-MCNC: 0.2 MG/DL (ref 0.2–1.2)
BILIRUBIN, DIRECT,CBIL: <0.2 MG/DL (ref 0–0.3)
BUN SERPL-MCNC: 21 MG/DL (ref 6–22)
CALCIUM SERPL-MCNC: 8.7 MG/DL (ref 8.4–10.5)
CHLORIDE SERPL-SCNC: 99 MMOL/L (ref 98–110)
CO2 SERPL-SCNC: 25 MMOL/L (ref 20–32)
CREAT SERPL-MCNC: 1.6 MG/DL (ref 0.8–1.4)
GFRAA, 66117: 38.5
GFRNA, 66118: 31.8
GLOBULIN,GLOB: 2.4 G/DL (ref 2–4)
GLUCOSE SERPL-MCNC: 142 MG/DL (ref 70–99)
POTASSIUM SERPL-SCNC: 3.9 MMOL/L (ref 3.5–5.5)
PROT SERPL-MCNC: 6.3 G/DL (ref 6.2–8.1)
RAPAMYCIN (SIROLIMUS): 7.6 NG/ML (ref 3–20)
SODIUM SERPL-SCNC: 140 MMOL/L (ref 133–145)

## 2018-01-19 ENCOUNTER — PATIENT OUTREACH (OUTPATIENT)
Dept: HEMATOLOGY | Age: 63
End: 2018-01-19

## 2018-01-19 NOTE — PROGRESS NOTES
Phone call placed to patient. Reviewed labs. Notified to decrease Prednisone to 10 mg daily. Advised to continue weekly labs. Patient reports she was started on Keflex yesterday by PCP for right hand cellulitis. Advised patient to seek additional care if no improvement of symptoms.

## 2018-01-26 ENCOUNTER — PATIENT OUTREACH (OUTPATIENT)
Dept: HEMATOLOGY | Age: 63
End: 2018-01-26

## 2018-01-29 ENCOUNTER — HOSPITAL ENCOUNTER (OUTPATIENT)
Dept: LAB | Age: 63
Discharge: HOME | End: 2018-01-29
Payer: MEDICARE

## 2018-01-29 ENCOUNTER — OFFICE VISIT (OUTPATIENT)
Dept: HEMATOLOGY | Age: 63
End: 2018-01-29

## 2018-01-29 VITALS
BODY MASS INDEX: 33.19 KG/M2 | DIASTOLIC BLOOD PRESSURE: 80 MMHG | HEIGHT: 65 IN | TEMPERATURE: 98.5 F | OXYGEN SATURATION: 97 % | HEART RATE: 93 BPM | SYSTOLIC BLOOD PRESSURE: 171 MMHG | RESPIRATION RATE: 16 BRPM | WEIGHT: 199.2 LBS

## 2018-01-29 DIAGNOSIS — R74.8 ELEVATED LIVER ENZYMES: Primary | ICD-10-CM

## 2018-01-29 DIAGNOSIS — R74.8 ELEVATED LIVER ENZYMES: ICD-10-CM

## 2018-01-29 LAB
ALBUMIN SERPL-MCNC: 2.9 G/DL (ref 3.4–5)
ALBUMIN/GLOB SERPL: 0.9 {RATIO} (ref 0.8–1.7)
ALP SERPL-CCNC: 189 U/L (ref 45–117)
ALT SERPL-CCNC: 54 U/L (ref 13–56)
ANION GAP SERPL CALC-SCNC: 11 MMOL/L (ref 3–18)
AST SERPL-CCNC: 64 U/L (ref 15–37)
BILIRUB SERPL-MCNC: 0.2 MG/DL (ref 0.2–1)
BUN SERPL-MCNC: 28 MG/DL (ref 7–18)
BUN/CREAT SERPL: 17 (ref 12–20)
CALCIUM SERPL-MCNC: 8.9 MG/DL (ref 8.5–10.1)
CHLORIDE SERPL-SCNC: 106 MMOL/L (ref 100–108)
CO2 SERPL-SCNC: 23 MMOL/L (ref 21–32)
CREAT SERPL-MCNC: 1.66 MG/DL (ref 0.6–1.3)
GLOBULIN SER CALC-MCNC: 3.2 G/DL (ref 2–4)
GLUCOSE SERPL-MCNC: 187 MG/DL (ref 74–99)
POTASSIUM SERPL-SCNC: 4.9 MMOL/L (ref 3.5–5.5)
PROT SERPL-MCNC: 6.1 G/DL (ref 6.4–8.2)
SODIUM SERPL-SCNC: 140 MMOL/L (ref 136–145)

## 2018-01-29 PROCEDURE — 80053 COMPREHEN METABOLIC PANEL: CPT | Performed by: INTERNAL MEDICINE

## 2018-01-29 PROCEDURE — 36415 COLL VENOUS BLD VENIPUNCTURE: CPT | Performed by: INTERNAL MEDICINE

## 2018-01-29 NOTE — MR AVS SNAPSHOT
[address] [address] 
[phone] Patient: Escobar Reynoso MRN: [id number] EXE:[id number] Visit Information Date & Time Provider Department Dept. Phone Encounter #  
 1/29/2018  1:45 PM MD Altagracia Gunter [address] [id number] Follow-up Instructions Return in about 4 weeks (around 2/26/2018) for MLS. Upcoming Health Maintenance Date Due Hepatitis C Screening 1955 FOOT EXAM Q1 9/24/1963 MICROALBUMIN Q1 9/24/1963 EYE EXAM RETINAL OR DILATED Q1 9/24/1963 DTaP/Tdap/Td series (1 - Tdap) 9/24/1974 FOBT Q 1 YEAR AGE 50-75 9/24/2003 Pneumococcal 19-64 Highest Risk (2 of 3 - PCV13) 1/1/2013 ZOSTER VACCINE AGE 60> 7/24/2013 PAP AKA CERVICAL CYTOLOGY 10/18/2014 HEMOGLOBIN A1C Q6M 12/15/2016 LIPID PANEL Q1 1/11/2018 BREAST CANCER SCRN MAMMOGRAM 11/22/2018 Allergies as of 1/29/2018  Review Complete On: 1/29/2018 By: Melisa Brower Severity Noted Reaction Type Reactions Tape [Adhesive] High 11/09/2017    Other (comments) Pulls skin off Percocet [Oxycodone-acetaminophen]  01/27/2013    Shortness of Breath, Itching, Other (comments) \"Burning skin\" Current Immunizations  Reviewed on 4/11/2013 Name Date Influenza Vaccine 1/21/2013 Pneumococcal Vaccine (Unspecified Type) 1/1/2012 Not reviewed this visit You Were Diagnosed With   
  
 Codes Comments Elevated liver enzymes    -  Primary ICD-10-CM: R74.8 ICD-9-CM: 790.5 Vitals BP Pulse Temp Resp Height(growth percentile) 171/80 (BP 1 Location: Left arm, BP Patient Position: Sitting) 93 98.5 °F (36.9 °C) (Tympanic) 16 5' 5\" (1.651 m) Weight(growth percentile) SpO2 BMI OB Status Smoking Status 199 lb 3.2 oz (90.4 kg) 97% 33.15 kg/m2 Postmenopausal Never Smoker BMI and BSA Data Body Mass Index Body Surface Area  
 33.15 kg/m 2 2.04 m 2 Preferred Pharmacy Pharmacy Name Phone [id number] [place] [phone] Your Updated Medication List  
  
   
This list is accurate as of: 1/29/18  2:26 PM.  Always use your most recent med list.  
  
  
  
  
 alendronate 70 mg tablet Commonly known as:  FOSAMAX Take 1 Tab by mouth every seven (7) days. cyproheptadine 4 mg tablet Commonly known as:  PERIACTIN Take 1 Tab by mouth three (3) times daily as needed. Indications: Pruritus of Skin  
  
 furosemide 40 mg tablet Commonly known as:  LASIX TAKE ONE TABLET BY MOUTH EVERY DAY  
  
 glimepiride 4 mg tablet Commonly known as:  AMARYL Take 4 mg by mouth daily. INSULIN PEN NEEDLE  
by Does Not Apply route. metoprolol tartrate 25 mg tablet Commonly known as:  LOPRESSOR Take 1 Tab by mouth two (2) times a day. mycophenolate 500 mg tablet Commonly known as:  CELLCEPT Take 2 Tabs by mouth two (2) times a day. predniSONE 20 mg tablet Commonly known as:  Edrie Power Take 2 Tabs by mouth daily (with breakfast). sirolimus 1 mg tablet Commonly known as:  RAPAMUNE Take 3 mg by mouth daily. VYVANSE PO Take  by mouth. Follow-up Instructions Return in about 4 weeks (around 2/26/2018) for MLS. Introducing [facility]! [employer] introduces [employer] patient portal. Now you can access parts of your medical record, email your doctor's office, and request medication refills online. 1. In your internet browser, go to [email or web address] 2. Click on the First Time User? Click Here link in the Sign In box. You will see the New Member Sign Up page. 3. Enter your [employer] Access Code exactly as it appears below. You will not need to use this code after youve completed the sign-up process.  If you do not sign up before the expiration date, you must request a new code. · [email or web address] Access Code: [email or web address] Expires: 2/4/2018 11:29 AM 
 
4. Enter the last four digits of your Social Security Number (xxxx) and Date of Birth (mm/dd/yyyy) as indicated and click Submit. You will be taken to the next sign-up page. 5. Create a [email or web address] ID. This will be your [email or web address] login ID and cannot be changed, so think of one that is secure and easy to remember. 6. Create a [email or web address] password. You can change your password at any time. 7. Enter your Password Reset Question and Answer. This can be used at a later time if you forget your password. 8. Enter your e-mail address. You will receive e-mail notification when new information is available in [address] 9. Click Sign Up. You can now view and download portions of your medical record. 10. Click the Download Summary menu link to download a portable copy of your medical information. If you have questions, please visit the Frequently Asked Questions section of the [email or web address] website. Remember, [email or web address] is NOT to be used for urgent needs. For medical emergencies, dial 911. Now available from your iPhone and Android! Please provide this summary of care documentation to your next provider. Your primary care clinician is listed as Susana Salmeron. If you have any questions after today's visit, please call [phone].

## 2018-01-29 NOTE — PROGRESS NOTES
[address] Jes[address] Ochoa MD, [address], [place], [place]       SILVA Nair PA-C Alvira Hom[place], [facility]   SILVA Brown NP        at [address]     [address] [place] [address], [address] [address]     [phone]     FAX: [phone]    at [address], [address]     [phone]     FAX: [phone]       Patient Care Team:  Eunice Edwards MD as PCP - General (Family Practice)  Sebastian Matute MD as Physician (Surgery)  Melissa Snowden MD as Physician (Plastic Surgery)  Corina Hampton MD as Physician (Obstetrics & Gynecology)  Elly Ramirez MD as Physician (Radiation Oncology)  Boaz Ovalles MD as Physician (Neurosurgery)  Alena Leon DDS as Physician ([address])  Corina Hampton MD as Physician (Ophthalmology)  Jann Mckenzie O.D. as Physician (Optometry)  Lisa Andres MD as Physician (Orthopedic Surgery)  Bianca Brunner, RN as Nurse Nilda Barros MD as Physician (Internal Medicine)  Jessie Ridley MD (Gastroenterology)  Skinny Rosenthal MD (Internal Medicine)        Problem List  Date Reviewed: 12/20/2017          Codes Class Noted    Liver replaced by transplant [facility]) ICD-10-CM: Z94.4  ICD-9-CM: V42.7  11/13/2013        Encounter for long-term (current) use of other medications ICD-10-CM: Z79.899  ICD-9-CM: V58.69  04/09/0913        Complication of transplanted liver [facility]) ICD-10-CM: T86.40  ICD-9-CM: 996.82  11/13/2013        Back pain, lumbosacral ICD-10-CM: M54.5  ICD-9-CM: 724.2, 724.6  1/27/2013        [facility] ([facility] ICD-10-CM: Z94.4  ICD-9-CM: V42.7  5/28/2012    Overview Addendum 6/25/2013  9:48 AM by Carlitos Muñiz MD     4/2013             Diabetes mellitus ([facility].) ICD-10-CM: E11.9  ICD-9-CM: 250.00  5/28/2012        Colon polyps ICD-10-CM: K63.5  ICD-9-CM: 211.3  5/28/2012        Breast cancer ([facility] ICD-10-CM: C50.919  ICD-9-CM: 174.9  5/28/2012    Overview Signed 5/28/2012  7:00 AM by Chuck Santiago MD     Masectomy, chemotherapy,XRT. 1996  Tamoxifen [id number]               H/O shoulder surgery ICD-10-CM: Z98.890  ICD-9-CM: V45.89  5/28/2012    Overview Signed 5/28/2012  7:07 AM by Chuck Santiago MD     12/2011                   Elsy Arora returns to the [address] for management of liver allograft function, to adjust immune suppression. The active problem list, all pertinent past medical history, medications, liver histology, endoscopic studies, radiologic findings and laboratory findings related to the liver disorder were reviewed with the patient. The patient underwent liver transplantation at [place], [place] in 4/2013. The patient is currently receiving sirolimus cellcept and prednsione for immune suppression. The most recent ultrasound of the liver was performed in 2/2015. This suggests that the liver is normal.      The patient had an acute graft rejection in 10/2017. The patient was treated with 3 gms of steroids over 3 days and then prednisone taper. She is currently taking prednisone 10 mg every day. The sirolimus dose is 3 mg BID. The dose of cellcept was increased to 1000 BID. The liver enzymes are now normal.      The Screat is 1.8 and stable. The patient has developed a severe cellulitis of the right hand which required surgical debridement. The patient has not experienced fatigue, pain in the right side over the liver,     The patient completes all daily activities without any functional limitations.       ALLERGIES:  Allergies   Allergen Reactions    Tape [Adhesive] Other (comments)     Pulls skin off    Percocet [Oxycodone-Acetaminophen] Shortness of Breath, Itching and Other (comments)     \"Burning skin\"    Statins-Hmg-Coa Reductase Inhibitors Other (comments)     liver     MEDICATIONS:  Current Outpatient Prescriptions   Medication Sig    NEEDLES, INSULIN DISPOSABLE (INSULIN PEN NEEDLE) by Does Not Apply route.  metoprolol tartrate (LOPRESSOR) 25 mg tablet Take 1 Tab by mouth two (2) times a day.  cyproheptadine (PERIACTIN) 4 mg tablet Take 1 Tab by mouth three (3) times daily as needed. Indications: Pruritus of Skin    sirolimus (RAPAMUNE) 1 mg tablet Take 3 mg by mouth daily.  mycophenolate (CELLCEPT) 500 mg tablet Take 2 Tabs by mouth two (2) times a day.  glimepiride (AMARYL) 4 mg tablet Take 4 mg by mouth daily.  alendronate (FOSAMAX) 70 mg tablet Take 1 Tab by mouth every seven (7) days.  LISDEXAMFETAMINE DIMESYLATE (VYVANSE PO) Take  by mouth.  furosemide (LASIX) 40 mg tablet TAKE ONE TABLET BY MOUTH EVERY DAY    insulin NPH (HUMULIN N) 100 unit/mL (3 mL) inpn by SubCUTAneous route.  amoxicillin (AMOXIL) 500 mg capsule Take 4 capsules by mouth 1 hour prior to dental procedure    predniSONE (DELTASONE) 5 mg tablet Take 1 Tab by mouth daily. No current facility-administered medications for this visit. SYSTEM REVIEW NOT RELATED TO LIVER DISEASE OR REVIEWED ABOVE:  Constitution systems: Weight gain with steroids. Eyes: Negative for visual changes. ENT: Negative for sore throat, painful swallowing. Respiratory: Negative for cough, hemoptysis, SOB. Cardiology: Negative for chest pain, palpitations. GI:  Negative for constipation or diarrhea. : Negative for urinary frequency, dysuria, hematuria, nocturia. Skin: Negative for rash. Hematology: Negative for easy bruising, blood clots. Musculo-skelatal: Sever cellulitis of right hand requried surgery and is now casted. The back pain is improved with the high dose steroids. Neurologic: Negative for headaches, dizziness, vertigo, memory problems not related to HE. Psychology: Negative for anxiety, depression.      FAMILY HISTORY:  There is no family history of liver disease. SOCIAL HISTORY:  The patient is . There are 2 children and 2 grandchildren. The patient has never used tobacco products. The patient has never consumed significant amounts of alcohol. The patient used to work for [employer] and then as an  for [employer].  She has not worked since the liver transplant. PHYSICAL EXAMINATION:    Visit Vitals    /80 (BP 1 Location: Left arm, BP Patient Position: Sitting)    Pulse 93    Temp 98.5 °F (36.9 °C) (Tympanic)    Resp 16    Ht 5' 5\" (1.651 m)    Wt 199 lb 3.2 oz (90.4 kg)    SpO2 97%    BMI 33.15 kg/m2       General: Appears healthy. No acute distress. Eyes: Sclera anicteric. ENT: No oral lesions. Thyroid normal.  Nodes: No adenopathy. Skin: No spider angiomata. No jaundice. No palmar erythema. Respiratory: Lungs clear to auscultation. Cardiovascular: Regular heart rate. No murmurs. No JVD. Abdomen:   Well healed liver transplant incision. Soft non-tender. Liver size normal to percussion/palpation. Spleen not palpable. No obvious ascites. Extremities: No lower edema. No muscle wasting. No gross arthritic changes. Neurologic:  Alert and oriented. Cranial nerves grossly intact. No asterixsis.     LAB STUDIES:  Liver [place] of [address] [place] [address] 1/29/2018 1/15/2018   WBC 4.0 - 11.0 K/uL  8.6   ANC 1.8 - 8.0 K/UL     HGB 11.7 - 16.0 g/dL  12.5    - 440 K/uL  217   INR 0.8 - 1.2       AST 10 - 37 U/L 64 (H) 27   ALT 5 - 40 U/L 54 34   Alk Phos 40 - 120 U/L 189 (H) 119   Bili, Total 0.2 - 1.2 mg/dL 0.2 0.2   Bili, Direct 0.0 - 0.3 mg/dL  <0.2   Albumin 3.5 - 5.0 g/dL 2.9 (L) 3.9   BUN 6 - 22 mg/dL 28 (H) 21   Creat 0.8 - 1.4 mg/dL 1.66 (H) 1.6 (H)   Na 133 - 145 mmol/L 140 140   K 3.5 - 5.5 mmol/L 4.9 3.9   Cl 98 - 110 mmol/L 106 99   CO2 20 - 32 mmol/L 23 25   Glucose 70 - 99 mg/dL 187 (H) 142 (H)   Magnesium 1.6 - 2.5 mg/dL       Liver Virology and Transplant Immune Suppression Latest Ref Rng & Units 11/22/2017   Sirolimus Level 3.0 - 20.0 ng/mL 5.8     Liver Virology and Transplant Immune Suppression Latest Ref Rng & Units 11/13/2017   Sirolimus Level 3.0 - 20.0 ng/mL 4.4     SEROLOGIES:  2/2012. HAV total negative, HBsAntigen negative, anti-HBcore negative, anti-HBsurface negative, anti-HCV negative, Ferritin 20, NEVA negative, ASMA negative, AMA negative, ceruloplsmin 34, alpha-1-antitrypsin 195, A1AT phenotype MM.  2/2013. Ferritin 434, ASMA weak positive, CMV IgG positive, EBV IgG positive, anti-HIV negative, HBA1C 5.1    LIVER HISTOLOGY:  11/2017. Slides reviewed by MLS. Acute graft rejection. ENDOSCOPIC PROCEDURES:  1/2012. EGD by Dr Enrioc Little. Esophageal varices. RADIOLOGY:  1/2012. CT scan abdomen with and without IV contrast.  Changes consistent with cirrhosis. No liver mass lesions. No dilated bile ducts. No bile duct strictures. Varices. Generalized ascites. 07/2012:  Ultrasound of the liver. Echogenic consistent with chronic liver disease, cirrhosis. No liver mass lesions. No ascites  11/2012: Ultrasound of the liver. Echogenic consistent with chronic liver disease, cirrhosis. No liver mass lesions. Small amount ascites present. 1/2013. Ultrasound of liver. Echogenic consistent with cirrhosis. No liver mass lesions. No dilated bile ducts. Mild ascites. 11/2013. Ultrasound of liver. Normal appearing liver. No liver mass lesions. 12/2013:  MRI of abdomen. Questionable small focal stenosis of distal common hepatic duct. Focal stenosis in mid-portal vein. OTHER TESTING:.   2/2013. ETOH negative. 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 This was treated with high dose prednisone which is now being tapered. She is now taking 10 mg prednisone.       Sirolimus dose is 3 mg every day. The sirolimus level was 8. Cellcept was increased to 1000 mg BID. Chronic renal insufficiency from immune suppression. Creatinine 1.5. Stable for now. Severe cellulitus of the right hand developed when she was being treated for rejection with high dose immune suppression. This has required surgical debridement. The hand and forarm are now casted. The patient was counseled regarding alcohol use. Osteoporosis is being treated with fosmax. She had a DEXA scan in October 2015 that continued to show osteoporosis, but increased BMD.     Patient should receive annual flu-vaccine from their primary care provider. Live vaccines should not be administered. She states that she received her flu shot in September 2017. Screening for skin cancer due to chronic immunosuppression. She was seen by Dr. Luís Rice in April 2016. No longer seeing this physician. She will find another dermatologist.     Laboratory studies should be performed every 4 weeks for now. [facility] 87 in 4 weeks.     Jak Restrepo MD  [facility] of [address] [place] [address], [address] [place] [address], [address] [place] [address] Street [address]  [phone]

## 2018-01-29 NOTE — PROGRESS NOTES
1. Have you been to the ER, urgent care clinic since your last visit? Hospitalized since your last visit? Yes When: 1/21/2018 [facility], Cellulitis    2. Have you seen or consulted any other health care providers outside of the [address] [place] [address] since your last visit? Include any pap smears or colon screening.  No

## 2018-02-02 NOTE — PROGRESS NOTES
Late entry: 1/26/18 - Received VM from patient who reports she was hospitalized for a hand infection and underwent surgery earlier this week. Patient stated she was anticipating discharge over the weekend and inquired if she needed to attend f/u appointment with Dr. Andrei Caldwell scheduled on 1/29. Discussed with Dr. Andrei Caldwell. Return call placed to patient. Informed patient to keep scheduled appointment. 2/1/2018 - Received VM from patient who reports she was prescribed Valtrex for a cold sore. Patient asked if it is ok for her to take medication. 2/2/2018 - Reviewed labs with Dr. Andrei Caldwell. Phone call placed to patient. Patient notified to continue current doses of medications. Advised to continue weekly labs. Ok'd Valtrex.

## 2018-02-12 LAB
A-G RATIO,AGRAT: 1.5 RATIO (ref 1.1–2.6)
ALBUMIN SERPL-MCNC: 3.7 G/DL (ref 3.5–5)
ALP SERPL-CCNC: 188 U/L (ref 40–120)
ALT SERPL-CCNC: 8 U/L (ref 5–40)
ANION GAP SERPL CALC-SCNC: 19 MMOL/L
AST SERPL W P-5'-P-CCNC: 35 U/L (ref 10–37)
BILIRUB SERPL-MCNC: 0.2 MG/DL (ref 0.2–1.2)
BILIRUBIN, DIRECT,CBIL: <0.2 MG/DL (ref 0–0.3)
BUN SERPL-MCNC: 19 MG/DL (ref 6–22)
CALCIUM SERPL-MCNC: 8.4 MG/DL (ref 8.4–10.5)
CHLORIDE SERPL-SCNC: 100 MMOL/L (ref 98–110)
CO2 SERPL-SCNC: 21 MMOL/L (ref 20–32)
CREAT SERPL-MCNC: 1.4 MG/DL (ref 0.8–1.4)
ERYTHROCYTE [DISTWIDTH] IN BLOOD BY AUTOMATED COUNT: 14.4 % (ref 10–16)
GFRAA, 66117: 44.8
GFRNA, 66118: 36.9
GLOBULIN,GLOB: 2.4 G/DL (ref 2–4)
GLUCOSE SERPL-MCNC: 134 MG/DL (ref 70–99)
HCT VFR BLD AUTO: 33.2 % (ref 35.1–48)
HGB BLD-MCNC: 10.8 G/DL (ref 11.7–16)
MCH RBC QN AUTO: 29 PG (ref 26–34)
MCHC RBC AUTO-ENTMCNC: 33 G/DL (ref 32–36)
MCV RBC AUTO: 88 FL (ref 80–95)
PLATELET # BLD AUTO: 195 K/UL (ref 140–440)
PMV BLD AUTO: 10.6 FL (ref 6–10.8)
POTASSIUM SERPL-SCNC: 3.7 MMOL/L (ref 3.5–5.5)
PROT SERPL-MCNC: 6.1 G/DL (ref 6.2–8.1)
RAPAMYCIN (SIROLIMUS): 9 NG/ML (ref 3–20)
RBC # BLD AUTO: 3.79 M/UL (ref 3.8–5.2)
SODIUM SERPL-SCNC: 140 MMOL/L (ref 133–145)
WBC # BLD AUTO: 5.2 K/UL (ref 4–11)

## 2018-02-16 ENCOUNTER — PATIENT OUTREACH (OUTPATIENT)
Dept: HEMATOLOGY | Age: 63
End: 2018-02-16

## 2018-03-02 LAB
A-G RATIO,AGRAT: 1.9 RATIO (ref 1.1–2.6)
ALBUMIN SERPL-MCNC: 4.2 G/DL (ref 3.5–5)
ALP SERPL-CCNC: 229 U/L (ref 40–120)
ALT SERPL-CCNC: 10 U/L (ref 5–40)
ANION GAP SERPL CALC-SCNC: 17 MMOL/L
AST SERPL W P-5'-P-CCNC: 66 U/L (ref 10–37)
BILIRUB SERPL-MCNC: 0.1 MG/DL (ref 0.2–1.2)
BILIRUBIN, DIRECT,CBIL: <0.2 MG/DL (ref 0–0.3)
BUN SERPL-MCNC: 23 MG/DL (ref 6–22)
CALCIUM SERPL-MCNC: 8.9 MG/DL (ref 8.4–10.5)
CHLORIDE SERPL-SCNC: 103 MMOL/L (ref 98–110)
CO2 SERPL-SCNC: 23 MMOL/L (ref 20–32)
CREAT SERPL-MCNC: 1.6 MG/DL (ref 0.8–1.4)
ERYTHROCYTE [DISTWIDTH] IN BLOOD BY AUTOMATED COUNT: 14.7 % (ref 10–16)
GFRAA, 66117: 38.5
GFRNA, 66118: 31.8
GLOBULIN,GLOB: 2.2 G/DL (ref 2–4)
GLUCOSE SERPL-MCNC: 146 MG/DL (ref 70–99)
HCT VFR BLD AUTO: 33.2 % (ref 35.1–48)
HGB BLD-MCNC: 10.1 G/DL (ref 11.7–16)
MCH RBC QN AUTO: 28 PG (ref 26–34)
MCHC RBC AUTO-ENTMCNC: 30 G/DL (ref 32–36)
MCV RBC AUTO: 93 FL (ref 80–95)
PLATELET # BLD AUTO: 228 K/UL (ref 140–440)
PMV BLD AUTO: 10.2 FL (ref 6–10.8)
POTASSIUM SERPL-SCNC: 4.3 MMOL/L (ref 3.5–5.5)
PROT SERPL-MCNC: 6.4 G/DL (ref 6.2–8.1)
RAPAMYCIN (SIROLIMUS): 5.9 NG/ML (ref 3–20)
RBC # BLD AUTO: 3.59 M/UL (ref 3.8–5.2)
SODIUM SERPL-SCNC: 143 MMOL/L (ref 133–145)
WBC # BLD AUTO: 4.9 K/UL (ref 4–11)

## 2018-03-05 ENCOUNTER — OFFICE VISIT (OUTPATIENT)
Dept: HEMATOLOGY | Age: 63
End: 2018-03-05

## 2018-03-05 VITALS
SYSTOLIC BLOOD PRESSURE: 159 MMHG | WEIGHT: 197.5 LBS | HEART RATE: 75 BPM | OXYGEN SATURATION: 96 % | TEMPERATURE: 99.9 F | DIASTOLIC BLOOD PRESSURE: 73 MMHG | HEIGHT: 65 IN | RESPIRATION RATE: 16 BRPM | BODY MASS INDEX: 32.9 KG/M2

## 2018-03-05 DIAGNOSIS — T86.41 LIVER TRANSPLANT REJECTION (HCC): ICD-10-CM

## 2018-03-05 DIAGNOSIS — Z94.4 S/P LIVER TRANSPLANT (HCC): ICD-10-CM

## 2018-03-05 DIAGNOSIS — Z94.4 LIVER REPLACED BY TRANSPLANT (HCC): Primary | ICD-10-CM

## 2018-03-05 RX ORDER — PREDNISONE 5 MG/1
5 TABLET ORAL DAILY
Qty: 90 TAB | Refills: 3 | Status: SHIPPED | OUTPATIENT
Start: 2018-03-05 | End: 2018-11-27

## 2018-03-05 NOTE — PROGRESS NOTES
1. Have you been to the ER, urgent care clinic since your last visit? Hospitalized since your last visit? Yes [facility] 2. Have you seen or consulted any other health care providers outside of the [address] [place] [address] since your last visit? Include any pap smears or colon screening.  No

## 2018-03-05 NOTE — PROGRESS NOTES
[address] Jessee Ochoa MD, [address], [place], [place]       SILVA Zhang PA-C Katharine Keepers, [facility]   SILVA Campoverde NP        at [address]     [address] [place] [address], [address] [facility], [address]     [phone]     FAX: [phone]    at [address], [id number] [place] [address], [address]     [phone]     FAX: [phone]       Patient Care Team:  Angelique Whalen MD as PCP - General (Family Practice)  Raquel Dominguez MD as Physician (Surgery)  Harvey Mobley MD as Physician (Plastic Surgery)  Lavern Johnson MD as Physician (Obstetrics & Gynecology)  Gray French MD as Physician (Radiation Oncology)  Toni Mcduffie MD as Physician (Neurosurgery)  Eb Powell DDS as Physician ([address])  Lavern Johnson MD as Physician (Ophthalmology)  Gucci Florence O.D. as Physician (Optometry)  Jenna Grant MD as Physician (Orthopedic Surgery)  Olivia Moore RN as Nurse Zoltan Vila MD as Physician (Internal Medicine)  Suri Odell MD (Gastroenterology)  Andres Andrade MD (Internal Medicine)        Problem List  Date Reviewed: 4/18/2018          Codes Class Noted    Liver replaced by transplant [facility]) ICD-10-CM: Z94.4  ICD-9-CM: V42.7  11/13/2013        Encounter for long-term (current) use of other medications ICD-10-CM: Z79.899  ICD-9-CM: V58.69  18/66/1792        Complication of transplanted liver [facility]) ICD-10-CM: T86.40  ICD-9-CM: 996.82  11/13/2013        Back pain, lumbosacral ICD-10-CM: M54.5  ICD-9-CM: 724.2, 724.6  1/27/2013        S[facility]P [facility] ([facility] ICD-10-CM: Z94.4  ICD-9-CM: V42.7  5/28/2012    Overview Addendum 6/25/2013  9:48 AM by Jose C Lema MD     4/2013             Diabetes mellitus ([facility].) ICD-10-CM: E11.9  ICD-9-CM: 250.00  5/28/2012        Colon polyps ICD-10-CM: K63.5  ICD-9-CM: 211.3  5/28/2012        Breast cancer ([facility] ICD-10-CM: C50.919  ICD-9-CM: 174.9  5/28/2012    Overview Signed 5/28/2012  7:00 AM by Tomas Taylor MD     Masectomy, chemotherapy,XRT. 1996  Tamoxifen [id number]               H/O shoulder surgery ICD-10-CM: Z98.890  ICD-9-CM: V45.89  5/28/2012    Overview Signed 5/28/2012  7:07 AM by Tmoas Taylor MD     12/2011                   Sari Adams returns to the [address] for management of liver allograft function, to adjust immune suppression. The active problem list, all pertinent past medical history, medications, liver histology, endoscopic studies, radiologic findings and laboratory findings related to the liver disorder were reviewed with the patient. The patient underwent liver transplantation at [facility] in 4/2013. The patient is currently receiving sirolimus cellcept and prednsione for immune suppression. The most recent ultrasound of the liver was performed in 2/2015. This suggests that the liver is normal.      The patient had an acute graft rejection in 10/2017. The patient was treated with 3 gms of steroids over 3 days and then prednisone taper. She is currently taking prednisone 10 mg every day. The sirolimus dose is 3 mg BID. The dose of cellcept was increased to 1000 BID. The AST is mildly elevated. The ALT is normal.  The ALP is normal.    The Screat is 1.4-1.6 mg. The patient has developed a severe cellulitis of the right hand which required 2 surgeries in 1/2018 and 2/2018. The hand and forearm are now casted. She is on home IV ABX. The patient has not experienced fatigue, pain in the right side over the liver    The patient completes all daily activities without any functional limitations.       ALLERGIES:  Allergies   Allergen Reactions    Tape [Adhesive] Other (comments)     Pulls skin off    Percocet [Oxycodone-Acetaminophen] Shortness of Breath, Itching and Other (comments)     \"Burning skin\"    Statins-Hmg-Coa Reductase Inhibitors Other (comments)     liver     MEDICATIONS:  Current Outpatient Prescriptions   Medication Sig    predniSONE (DELTASONE) 5 mg tablet Take 1 Tab by mouth daily.  NEEDLES, INSULIN DISPOSABLE (INSULIN PEN NEEDLE) by Does Not Apply route.  metoprolol tartrate (LOPRESSOR) 25 mg tablet Take 1 Tab by mouth two (2) times a day.  cyproheptadine (PERIACTIN) 4 mg tablet Take 1 Tab by mouth three (3) times daily as needed. Indications: Pruritus of Skin    sirolimus (RAPAMUNE) 1 mg tablet Take 3 mg by mouth daily.  mycophenolate (CELLCEPT) 500 mg tablet Take 2 Tabs by mouth two (2) times a day.  alendronate (FOSAMAX) 70 mg tablet Take 1 Tab by mouth every seven (7) days.  furosemide (LASIX) 40 mg tablet TAKE ONE TABLET BY MOUTH EVERY DAY    insulin NPH (HUMULIN N) 100 unit/mL (3 mL) inpn by SubCUTAneous route.  amoxicillin (AMOXIL) 500 mg capsule Take 4 capsules by mouth 1 hour prior to dental procedure    glimepiride (AMARYL) 4 mg tablet Take 4 mg by mouth daily.  LISDEXAMFETAMINE DIMESYLATE (VYVANSE PO) Take  by mouth. No current facility-administered medications for this visit. SYSTEM REVIEW NOT RELATED TO LIVER DISEASE OR REVIEWED ABOVE:  Constitution systems: Weight gain with steroids. Eyes: Negative for visual changes. ENT: Negative for sore throat, painful swallowing. Respiratory: Negative for cough, hemoptysis, SOB. Cardiology: Negative for chest pain, palpitations. GI:  Negative for constipation or diarrhea. : Negative for urinary frequency, dysuria, hematuria, nocturia. Skin: Negative for rash. Hematology: Negative for easy bruising, blood clots. Musculo-skelatal: Severe cellulitis of right hand requried surgery and is now casted. The back pain is improved with the high dose steroids.   Neurologic: Negative for headaches, dizziness, vertigo, memory problems not related to HE. Psychology: Negative for anxiety, depression. FAMILY HISTORY:  There is no family history of liver disease. SOCIAL HISTORY:  The patient is . There are 2 children and 2 grandchildren. The patient has never used tobacco products. The patient has never consumed significant amounts of alcohol. The patient used to work for [employer] and then as an  for [employer].  She has not worked since the liver transplant. PHYSICAL EXAMINATION:    Visit Vitals    /73 (BP 1 Location: Left arm, BP Patient Position: Sitting)    Pulse 75    Temp 99.9 °F (37.7 °C) (Tympanic)    Resp 16    Ht 5' 5\" (1.651 m)    Wt 197 lb 8 oz (89.6 kg)    SpO2 96%    BMI 32.87 kg/m2       General: Appears healthy. No acute distress. Eyes: Sclera anicteric. ENT: No oral lesions. Thyroid normal.  Nodes: No adenopathy. Skin: No spider angiomata. No jaundice. No palmar erythema. Respiratory: Lungs clear to auscultation. Cardiovascular: Regular heart rate. No murmurs. No JVD. Abdomen:   Well healed liver transplant incision. Soft non-tender. Liver size normal to percussion/palpation. Spleen not palpable. No obvious ascites. Extremities: No lower edema. No muscle wasting. No gross arthritic changes. Neurologic:  Alert and oriented. Cranial nerves grossly intact. No asterixsis.     LAB STUDIES:  Liver [place] of [address] [place] [address] 3/2/2018 2/12/2018   WBC 4.0 - 11.0 K/uL 4.9 5.2   ANC 1.8 - 8.0 K/UL     HGB 11.7 - 16.0 g/dL 10.1 (L) 10.8 (L)    - 440 K/uL 228 195   INR 0.8 - 1.2       AST 10 - 37 U/L 66 (H) 35   ALT 5 - 40 U/L 10 8   Alk Phos 40 - 120 U/L 229 (H) 188 (H)   Bili, Total 0.2 - 1.2 mg/dL 0.1 (L) 0.2   Bili, Direct 0.0 - 0.3 mg/dL <0.2 <0.2   Albumin 3.5 - 5.0 g/dL 4.2 3.7   BUN 6 - 22 mg/dL 23 (H) 19   Creat 0.8 - 1.4 mg/dL 1.6 (H) 1.4   Na 133 - 145 mmol/L 143 140   K 3.5 - 5.5 mmol/L 4.3 3.7   Cl 98 - 110 mmol/L 103 100   CO2 20 - 32 mmol/L 23 21   Glucose 70 - 99 mg/dL 146 (H) 134 (H)   Magnesium 1.6 - 2.5 mg/dL         Liver Virology and Transplant Immune Suppression Latest Ref Rng & Units 11/22/2017   Sirolimus Level 3.0 - 20.0 ng/mL 5.8     Liver Virology and Transplant Immune Suppression Latest Ref Rng & Units 11/13/2017   Sirolimus Level 3.0 - 20.0 ng/mL 4.4     SEROLOGIES:  2/2012. HAV total negative, HBsAntigen negative, anti-HBcore negative, anti-HBsurface negative, anti-HCV negative, Ferritin 20, NEVA negative, ASMA negative, AMA negative, ceruloplsmin 34, alpha-1-antitrypsin 195, A1AT phenotype MM.  2/2013. Ferritin 434, ASMA weak positive, CMV IgG positive, EBV IgG positive, anti-HIV negative, HBA1C 5.1    LIVER HISTOLOGY:  11/2017. Slides reviewed by MLS. Acute graft rejection. ENDOSCOPIC PROCEDURES:  1/2012. EGD by Dr Sal De Souza. Esophageal varices. RADIOLOGY:  1/2012. CT scan abdomen with and without IV contrast.  Changes consistent with cirrhosis. No liver mass lesions. No dilated bile ducts. No bile duct strictures. Varices. Generalized ascites. 07/2012:  Ultrasound of the liver. Echogenic consistent with chronic liver disease, cirrhosis. No liver mass lesions. No ascites  11/2012: Ultrasound of the liver. Echogenic consistent with chronic liver disease, cirrhosis. No liver mass lesions. Small amount ascites present. 1/2013. Ultrasound of liver. Echogenic consistent with cirrhosis. No liver mass lesions. No dilated bile ducts. Mild ascites. 11/2013. Ultrasound of liver. Normal appearing liver. No liver mass lesions. 12/2013:  MRI of abdomen. Questionable small focal stenosis of distal common hepatic duct. Focal stenosis in mid-portal vein. OTHER TESTING:.   2/2013. ETOH negative. 08/2013:  DEXA scan - osteoporosis     ASSESSMENT AND PLAN:  Liver transplant for Crytopgenic cirrhosis.       Acute graft rejection in 10/2017. This was treated with high dose prednisone which is now being tapered. Will reduce the dose of prednisone to 5 mg every day. Sirolimus dose is 3 mg every day. The sirolimus level was 8. Cellcept was increased to 1000 mg BID. Chronic renal insufficiency from immune suppression. Creatinine is in the 1.4-1.6 mg range. Severe cellulitus of the right hand developed when she was being treated for rejection with high dose immune suppression. This has required surgical debridement. The hand and forarm are now casted. The patient was counseled regarding alcohol use. Osteoporosis is being treated with fosmax. She had a DEXA scan in October 2015 that continued to show osteoporosis, but increased BMD.     Patient should receive annual flu-vaccine from their primary care provider. Live vaccines should not be administered. She states that she received her flu shot in September 2017. Laboratory studies should be performed every 4 weeks for now. [facility] 87 in 4 weeks.     Dilshad Josue MD  Liver [place] of [address] [place] [address], [address] [place] [address], [address] [place] [address] Street [address]  [phone]

## 2018-03-05 NOTE — MR AVS SNAPSHOT
[address], [place] [address] 
[phone] Patient: Escobar Reynoso MRN: [id number] ESJ:0/30/7641 Visit Information Date & Time Provider Department Dept. Phone Encounter #  
 3/5/2018  1:45 PM MD Altagracia Gunter 13 of [place] [id number] Upcoming Health Maintenance Date Due Hepatitis C Screening 1955 FOOT EXAM Q1 9/24/1963 MICROALBUMIN Q1 9/24/1963 EYE EXAM RETINAL OR DILATED Q1 9/24/1963 DTaP/Tdap/Td series (1 - Tdap) 9/24/1974 FOBT Q 1 YEAR AGE 50-75 9/24/2003 Pneumococcal 19-64 Highest Risk (2 of 3 - PCV13) 1/1/2013 ZOSTER VACCINE AGE 60> 7/24/2013 PAP AKA CERVICAL CYTOLOGY 10/18/2014 HEMOGLOBIN A1C Q6M 12/15/2016 LIPID PANEL Q1 1/11/2018 BREAST CANCER SCRN MAMMOGRAM 11/22/2018 Allergies as of 3/5/2018  Review Complete On: 3/5/2018 By: Melisa Brower Severity Noted Reaction Type Reactions Tape [Adhesive] High 11/09/2017    Other (comments) Pulls skin off Percocet [Oxycodone-acetaminophen]  01/27/2013    Shortness of Breath, Itching, Other (comments) \"Burning skin\" Statins-hmg-coa Reductase Inhibitors  03/05/2018    Other (comments) liver Current Immunizations  Reviewed on 4/11/2013 Name Date Influenza Vaccine 1/21/2013 Pneumococcal Vaccine (Unspecified Type) 1/1/2012 Not reviewed this visit Vitals BP Pulse Temp Resp Height(growth percentile) 159/73 (BP 1 Location: Left arm, BP Patient Position: Sitting) 75 99.9 °F (37.7 °C) (Tympanic) 16 5' 5\" (1.651 m) Weight(growth percentile) SpO2 BMI OB Status Smoking Status 197 lb 8 oz (89.6 kg) 96% 32.87 kg/m2 Postmenopausal Never Smoker BMI and BSA Data Body Mass Index Body Surface Area  
 32.87 kg/m 2 2.03 m 2 Preferred Pharmacy Pharmacy Name Phone [id number] [place] [phone] Your Updated Medication List  
  
   
This list is accurate as of 3/5/18  2:01 PM.  Always use your most recent med list.  
  
  
  
  
 alendronate 70 mg tablet Commonly known as:  FOSAMAX Take 1 Tab by mouth every seven (7) days. cyproheptadine 4 mg tablet Commonly known as:  PERIACTIN Take 1 Tab by mouth three (3) times daily as needed. Indications: Pruritus of Skin  
  
 furosemide 40 mg tablet Commonly known as:  LASIX TAKE ONE TABLET BY MOUTH EVERY DAY  
  
 glimepiride 4 mg tablet Commonly known as:  AMARYL Take 4 mg by mouth daily. INSULIN PEN NEEDLE  
by Does Not Apply route. metoprolol tartrate 25 mg tablet Commonly known as:  LOPRESSOR Take 1 Tab by mouth two (2) times a day. mycophenolate 500 mg tablet Commonly known as:  CELLCEPT Take 2 Tabs by mouth two (2) times a day. predniSONE 5 mg tablet Commonly known as:  Geronimo Fast Take 1 Tab by mouth daily. sirolimus 1 mg tablet Commonly known as:  RAPAMUNE Take 3 mg by mouth daily. VYVANSE PO Take  by mouth. Prescriptions Sent to Pharmacy Refills  
 predniSONE (DELTASONE) 5 mg tablet 3 Sig: Take 1 Tab by mouth daily. Class: Normal  
 Pharmacy: [address] [place] [address] [facility] #: [phone] Route: Oral  
  
Introducing \[facility]\"" & HEALTH SERVICES! Alyssa Alejandra introduces [employer] patient portal. Now you can access parts of your medical record, email your doctor's office, and request medication refills online. 1. In your internet browser, go to [email or web address] 2. Click on the First Time User? Click Here link in the Sign In box. You will see the New Member Sign Up page. 3. Enter your Pic[employer] Access Code exactly as it appears below. You will not need to use this code after youve completed the sign-up process.  If you do not sign up before the expiration date, you must request a new code. · [employer] Access Code: [email or web address] Expires: 6/3/2018  2:01 PM 
 
4. Enter the last four digits of your Social Security Number (xxxx) and Date of Birth (mm/dd/yyyy) as indicated and click Submit. You will be taken to the next sign-up page. 5. Create a [employer] ID. This will be your [employer] login ID and cannot be changed, so think of one that is secure and easy to remember. 6. Create a [employer] password. You can change your password at any time. 7. Enter your Password Reset Question and Answer. This can be used at a later time if you forget your password. 8. Enter your e-mail address. You will receive e-mail notification when new information is available in [address] 9. Click Sign Up. You can now view and download portions of your medical record. 10. Click the Download Summary menu link to download a portable copy of your medical information. If you have questions, please visit the Frequently Asked Questions section of the [employer] website. Remember, [employer] is NOT to be used for urgent needs. For medical emergencies, dial 911. Now available from your iPhone and Android! Please provide this summary of care documentation to your next provider. Your primary care clinician is listed as Rene Doctor. If you have any questions after today's visit, please call [phone].

## 2018-03-07 NOTE — PROGRESS NOTES
Phone call placed to patient to review lab results. Patient reports she is currently hospitalized at [facility] because of worsening hand condition. Patient was admitted on 2/14. She reports she is unsure when she will be discharged. Plan is for MRI which she anticipates will be done over the weekend. Further plan of care to be determined based on results. Asked that patient keen NN informed of progress and discharge. yes

## 2018-03-13 LAB
A-G RATIO,AGRAT: 1.7 RATIO (ref 1.1–2.6)
ALBUMIN SERPL-MCNC: 4.3 G/DL (ref 3.5–5)
ALP SERPL-CCNC: 245 U/L (ref 40–120)
ALT SERPL-CCNC: 37 U/L (ref 5–40)
ANION GAP SERPL CALC-SCNC: 20 MMOL/L
AST SERPL W P-5'-P-CCNC: 55 U/L (ref 10–37)
BILIRUB SERPL-MCNC: 0.1 MG/DL (ref 0.2–1.2)
BILIRUBIN, DIRECT,CBIL: <0.2 MG/DL (ref 0–0.3)
BUN SERPL-MCNC: 24 MG/DL (ref 6–22)
CALCIUM SERPL-MCNC: 9.1 MG/DL (ref 8.4–10.5)
CHLORIDE SERPL-SCNC: 98 MMOL/L (ref 98–110)
CO2 SERPL-SCNC: 22 MMOL/L (ref 20–32)
CREAT SERPL-MCNC: 1.3 MG/DL (ref 0.8–1.4)
ERYTHROCYTE [DISTWIDTH] IN BLOOD BY AUTOMATED COUNT: 14.3 % (ref 10–15.5)
GFRAA, 66117: 48.3
GFRNA, 66118: 39.8
GLOBULIN,GLOB: 2.5 G/DL (ref 2–4)
GLUCOSE SERPL-MCNC: 205 MG/DL (ref 70–99)
HCT VFR BLD AUTO: 39 % (ref 35.1–48)
HGB BLD-MCNC: 12.1 G/DL (ref 11.7–16)
MCH RBC QN AUTO: 28 PG (ref 26–34)
MCHC RBC AUTO-ENTMCNC: 31 G/DL (ref 31–36)
MCV RBC AUTO: 92 FL (ref 80–95)
PLATELET # BLD AUTO: 264 K/UL (ref 140–440)
PMV BLD AUTO: 10.5 FL (ref 9–13)
POTASSIUM SERPL-SCNC: 4.7 MMOL/L (ref 3.5–5.5)
PROT SERPL-MCNC: 6.8 G/DL (ref 6.2–8.1)
RAPAMYCIN (SIROLIMUS): 7.8 NG/ML (ref 3–20)
RBC # BLD AUTO: 4.26 M/UL (ref 3.8–5.2)
SODIUM SERPL-SCNC: 140 MMOL/L (ref 133–145)
WBC # BLD AUTO: 6 K/UL (ref 4–11)

## 2018-03-19 LAB
A-G RATIO,AGRAT: 1.4 RATIO (ref 1.1–2.6)
ALBUMIN SERPL-MCNC: 3.8 G/DL (ref 3.5–5)
ALP SERPL-CCNC: 199 U/L (ref 40–120)
ALT SERPL-CCNC: 51 U/L (ref 5–40)
ANION GAP SERPL CALC-SCNC: 18 MMOL/L
AST SERPL W P-5'-P-CCNC: 59 U/L (ref 10–37)
BILIRUB SERPL-MCNC: 0.2 MG/DL (ref 0.2–1.2)
BILIRUBIN, DIRECT,CBIL: <0.2 MG/DL (ref 0–0.3)
BUN SERPL-MCNC: 25 MG/DL (ref 6–22)
CALCIUM SERPL-MCNC: 8.9 MG/DL (ref 8.4–10.5)
CHLORIDE SERPL-SCNC: 99 MMOL/L (ref 98–110)
CO2 SERPL-SCNC: 25 MMOL/L (ref 20–32)
CREAT SERPL-MCNC: 1.6 MG/DL (ref 0.8–1.4)
ERYTHROCYTE [DISTWIDTH] IN BLOOD BY AUTOMATED COUNT: 13.8 % (ref 10–15.5)
GFRAA, 66117: 38.5
GFRNA, 66118: 31.8
GLOBULIN,GLOB: 2.7 G/DL (ref 2–4)
GLUCOSE SERPL-MCNC: 161 MG/DL (ref 70–99)
HCT VFR BLD AUTO: 37.3 % (ref 35.1–48)
HGB BLD-MCNC: 11.6 G/DL (ref 11.7–16)
MCH RBC QN AUTO: 28 PG (ref 26–34)
MCHC RBC AUTO-ENTMCNC: 31 G/DL (ref 31–36)
MCV RBC AUTO: 89 FL (ref 80–95)
PLATELET # BLD AUTO: 236 K/UL (ref 140–440)
PMV BLD AUTO: 10.4 FL (ref 9–13)
POTASSIUM SERPL-SCNC: 3.9 MMOL/L (ref 3.5–5.5)
PROT SERPL-MCNC: 6.5 G/DL (ref 6.2–8.1)
RAPAMYCIN (SIROLIMUS): 9.5 NG/ML (ref 3–20)
RBC # BLD AUTO: 4.19 M/UL (ref 3.8–5.2)
SODIUM SERPL-SCNC: 142 MMOL/L (ref 133–145)
WBC # BLD AUTO: 5.8 K/UL (ref 4–11)

## 2018-03-27 LAB
A-G RATIO,AGRAT: 1.4 RATIO (ref 1.1–2.6)
ALBUMIN SERPL-MCNC: 3.8 G/DL (ref 3.5–5)
ALP SERPL-CCNC: 168 U/L (ref 40–120)
ALT SERPL-CCNC: 31 U/L (ref 5–40)
AST SERPL W P-5'-P-CCNC: 34 U/L (ref 10–37)
BILIRUB SERPL-MCNC: 0.2 MG/DL (ref 0.2–1.2)
BILIRUBIN, DIRECT,CBIL: <0.2 MG/DL (ref 0–0.3)
ERYTHROCYTE [DISTWIDTH] IN BLOOD BY AUTOMATED COUNT: 13.7 % (ref 10–15.5)
GLOBULIN,GLOB: 2.8 G/DL (ref 2–4)
HCT VFR BLD AUTO: 38.8 % (ref 35.1–48)
HGB BLD-MCNC: 12.2 G/DL (ref 11.7–16)
MCH RBC QN AUTO: 28 PG (ref 26–34)
MCHC RBC AUTO-ENTMCNC: 31 G/DL (ref 31–36)
MCV RBC AUTO: 89 FL (ref 80–95)
PLATELET # BLD AUTO: 229 K/UL (ref 140–440)
PMV BLD AUTO: 11 FL (ref 9–13)
PROT SERPL-MCNC: 6.6 G/DL (ref 6.2–8.1)
RAPAMYCIN (SIROLIMUS): 9.9 NG/ML (ref 3–20)
RBC # BLD AUTO: 4.35 M/UL (ref 3.8–5.2)
WBC # BLD AUTO: 5.7 K/UL (ref 4–11)

## 2018-04-02 LAB
ERYTHROCYTE [DISTWIDTH] IN BLOOD BY AUTOMATED COUNT: 13.4 % (ref 10–15.5)
HCT VFR BLD AUTO: 37.6 % (ref 35.1–48)
HGB BLD-MCNC: 12.2 G/DL (ref 11.7–16)
MCH RBC QN AUTO: 28 PG (ref 26–34)
MCHC RBC AUTO-ENTMCNC: 32 G/DL (ref 31–36)
MCV RBC AUTO: 87 FL (ref 80–95)
PLATELET # BLD AUTO: 264 K/UL (ref 140–440)
PMV BLD AUTO: 11.6 FL (ref 9–13)
RAPAMYCIN (SIROLIMUS): 8.6 NG/ML (ref 3–20)
RBC # BLD AUTO: 4.34 M/UL (ref 3.8–5.2)
WBC # BLD AUTO: 5.1 K/UL (ref 4–11)

## 2018-04-05 ENCOUNTER — PATIENT OUTREACH (OUTPATIENT)
Dept: HEMATOLOGY | Age: 63
End: 2018-04-05

## 2018-04-05 DIAGNOSIS — T86.41 LIVER TRANSPLANT REJECTION (HCC): Primary | ICD-10-CM

## 2018-04-05 RX ORDER — AMOXICILLIN 500 MG/1
CAPSULE ORAL
Qty: 4 CAP | Refills: 0 | Status: SHIPPED | OUTPATIENT
Start: 2018-04-05 | End: 2019-12-16

## 2018-04-05 NOTE — PROGRESS NOTES
Reviewed recent lab results. Scheduled f/u appointment with Dr. Jazmyne Rojas on 4/18. Patient reports she was supposed to get a crown fixed, but it was put on hold when she got the hand infection. Patient states she was advised by another physician to [facility] a couple weeks\" before having the dental work performed. She reports that time period has passed and she asked if she can proceed from our standpoint. Patient reports she has completed antibiotics for hand infection. Advised patient she may have dental work performed. Patient notified she should take antibiotics prior which have been sent to the pharmacy. Patient verbalized understanding.

## 2018-04-09 LAB
A-G RATIO,AGRAT: 1.2 RATIO (ref 1.1–2.6)
ALBUMIN SERPL-MCNC: 3.6 G/DL (ref 3.5–5)
ALP SERPL-CCNC: 180 U/L (ref 40–120)
ALT SERPL-CCNC: 42 U/L (ref 5–40)
AST SERPL W P-5'-P-CCNC: 55 U/L (ref 10–37)
BILIRUB SERPL-MCNC: 0.2 MG/DL (ref 0.2–1.2)
BILIRUBIN, DIRECT,CBIL: <0.2 MG/DL (ref 0–0.3)
ERYTHROCYTE [DISTWIDTH] IN BLOOD BY AUTOMATED COUNT: 13.3 % (ref 10–15.5)
GLOBULIN,GLOB: 3.1 G/DL (ref 2–4)
HCT VFR BLD AUTO: 38 % (ref 35.1–48)
HGB BLD-MCNC: 12 G/DL (ref 11.7–16)
MCH RBC QN AUTO: 28 PG (ref 26–34)
MCHC RBC AUTO-ENTMCNC: 32 G/DL (ref 31–36)
MCV RBC AUTO: 87 FL (ref 80–95)
PLATELET # BLD AUTO: 235 K/UL (ref 140–440)
PMV BLD AUTO: 10.9 FL (ref 9–13)
PROT SERPL-MCNC: 6.7 G/DL (ref 6.2–8.1)
RAPAMYCIN (SIROLIMUS): 7.7 NG/ML (ref 3–20)
RBC # BLD AUTO: 4.35 M/UL (ref 3.8–5.2)
WBC # BLD AUTO: 5.6 K/UL (ref 4–11)

## 2018-04-16 LAB
A-G RATIO,AGRAT: 1.7 RATIO (ref 1.1–2.6)
ALBUMIN SERPL-MCNC: 4 G/DL (ref 3.5–5)
ALP SERPL-CCNC: 165 U/L (ref 40–120)
ALT SERPL-CCNC: 33 U/L (ref 5–40)
AST SERPL W P-5'-P-CCNC: 27 U/L (ref 10–37)
BILIRUB SERPL-MCNC: 0.1 MG/DL (ref 0.2–1.2)
BILIRUBIN, DIRECT,CBIL: <0.2 MG/DL (ref 0–0.3)
ERYTHROCYTE [DISTWIDTH] IN BLOOD BY AUTOMATED COUNT: 13.3 % (ref 10–15.5)
GLOBULIN,GLOB: 2.3 G/DL (ref 2–4)
HCT VFR BLD AUTO: 37.2 % (ref 35.1–48)
HGB BLD-MCNC: 11.9 G/DL (ref 11.7–16)
MCH RBC QN AUTO: 28 PG (ref 26–34)
MCHC RBC AUTO-ENTMCNC: 32 G/DL (ref 31–36)
MCV RBC AUTO: 87 FL (ref 80–95)
PLATELET # BLD AUTO: 212 K/UL (ref 140–440)
PMV BLD AUTO: 10.6 FL (ref 9–13)
PROT SERPL-MCNC: 6.3 G/DL (ref 6.2–8.1)
RAPAMYCIN (SIROLIMUS): 7.6 NG/ML (ref 3–20)
RBC # BLD AUTO: 4.27 M/UL (ref 3.8–5.2)
WBC # BLD AUTO: 6.1 K/UL (ref 4–11)

## 2018-04-18 ENCOUNTER — OFFICE VISIT (OUTPATIENT)
Dept: HEMATOLOGY | Age: 63
End: 2018-04-18

## 2018-04-18 VITALS
HEIGHT: 65 IN | DIASTOLIC BLOOD PRESSURE: 65 MMHG | BODY MASS INDEX: 33.15 KG/M2 | SYSTOLIC BLOOD PRESSURE: 144 MMHG | WEIGHT: 199 LBS | TEMPERATURE: 99.3 F | HEART RATE: 74 BPM | OXYGEN SATURATION: 97 %

## 2018-04-18 DIAGNOSIS — T86.41 LIVER TRANSPLANT REJECTION (HCC): Primary | ICD-10-CM

## 2018-04-18 DIAGNOSIS — Z79.899 LONG TERM CURRENT USE OF IMMUNOSUPPRESSIVE DRUG: ICD-10-CM

## 2018-04-18 DIAGNOSIS — Z94.4 H/O LIVER TRANSPLANT (HCC): ICD-10-CM

## 2018-04-18 NOTE — PROGRESS NOTES
[address] MD, Rommel Merino, [place], [place]       Marcos Mckee, NP Mellissa Boas, PA-C Carmella Cuna, MIKHAILP-BC   SILVA Bateman NP        at [address], [address] [address]     [phone]     FAX: [phone]    at [address], [address]     [phone]     FAX: [phone]       Patient Care Team:  Marcial Espino MD as PCP - General (Family Practice)  Tl Saravia MD as Physician (Surgery)  Malika Braden MD as Physician (Plastic Surgery)  Park Osborne MD as Physician (Obstetrics & Gynecology)  Cisco Goode MD as Physician (Radiation Oncology)  Anand No MD as Physician (Neurosurgery)  Alexis Perez DDS as Physician ([address])  Park Osborne MD as Physician (Ophthalmology)  Cresencio Silva O.D. as Physician (Optometry)  Geovani Rosenthal MD as Physician (Orthopedic Surgery)  Atilio Stanton RN as Nurse Shannon Gaines MD as Physician (Internal Medicine)  Dori Huizar MD (Gastroenterology)  Colette Aceves MD (Internal Medicine)        Problem List  Date Reviewed: 4/18/2018          Codes Class Noted    Liver replaced by transplant [facility]) ICD-10-CM: Z94.4  ICD-9-CM: V42.7  11/13/2013        Encounter for long-term (current) use of other medications ICD-10-CM: Z79.899  ICD-9-CM: V58.69  05/81/7921        Complication of transplanted liver [facility]) ICD-10-CM: T86.40  ICD-9-CM: 996.82  11/13/2013        Back pain, lumbosacral ICD-10-CM: M54.5  ICD-9-CM: 724.2, 724.6  1/27/2013        S/P liver transplant ([facility] ICD-10-CM: Z94.4  ICD-9-CM: V42.7  5/28/2012    Overview Addendum 6/25/2013  9:48 AM by Julian Ryder MD     4/2013             Di[facility] ICD-10-CM: E11.9  ICD-9-CM: 250.00  5/28/2012        Colon polyps ICD-10-CM: K63.5  ICD-9-CM: 211.3  5/28/2012        Bre[facility] ([facility] ICD-10-CM: C50.919  ICD-9-CM: 174.9  5/28/2012    Overview Signed 5/28/2012  7:00 AM by Tomas Taylor MD     Masectomy, chemotherapy,XRT. 1996  Tamoxifen [id number]               H/O shoulder surgery ICD-10-CM: Z98.890  ICD-9-CM: V45.89  5/28/2012    Overview Signed 5/28/2012  7:07 AM by Tomas Taylor MD     12/2011                   Sari Adams returns to the [facility] for management of liver allograft function, to adjust immune suppression. The active problem list, all pertinent past medical history, medications, liver histology, endoscopic studies, radiologic findings and laboratory findings related to the liver disorder were reviewed with the patient. The patient underwent liver transplantation at [place], [place] in 4/2013. The patient is currently receiving sirolimus cellcept and prednsione for immune suppression. The most recent ultrasound of the liver was performed in 2/2015. This suggests that the liver is normal.      The patient had an acute graft rejection in 10/2017. The patient was treated with 3 gms of steroids over 3 days and then prednisone taper. She is currently taking prednisone 5 mg every day. The sirolimus dose is 3 mg BID. The dose of cellcept was increased to 1000 BID. The AST is normal.  The ALT is normal.  The ALP is mildly elevated. The Screat is 1.4-1.6 mg. The patient has developed a severe cellulitis of the right hand which required 2 surgeries in 1/2018 and 2/2018. The hand and forearm are now casted. She is on home IV ABX. The patient has not experienced fatigue, pain in the right side over the liver    The patient completes all daily activities without any functional limitations.       ALLERGIES:  Allergies   Allergen Reactions    Tape [Adhesive] Other (comments)     Pulls skin off    Percocet [Oxycodone-Acetaminophen] Shortness of Breath, Itching and Other (comments)     \"Burning skin\"    Statins-Hmg-Coa Reductase Inhibitors Other (comments)     liver     MEDICATIONS:  Current Outpatient Prescriptions   Medication Sig    insulin NPH (HUMULIN N) 100 unit/mL (3 mL) inpn by SubCUTAneous route.  amoxicillin (AMOXIL) 500 mg capsule Take 4 capsules by mouth 1 hour prior to dental procedure    predniSONE (DELTASONE) 5 mg tablet Take 1 Tab by mouth daily.  NEEDLES, INSULIN DISPOSABLE (INSULIN PEN NEEDLE) by Does Not Apply route.  metoprolol tartrate (LOPRESSOR) 25 mg tablet Take 1 Tab by mouth two (2) times a day.  sirolimus (RAPAMUNE) 1 mg tablet Take 3 mg by mouth daily.  mycophenolate (CELLCEPT) 500 mg tablet Take 2 Tabs by mouth two (2) times a day.  LISDEXAMFETAMINE DIMESYLATE (VYVANSE PO) Take  by mouth.  furosemide (LASIX) 40 mg tablet TAKE ONE TABLET BY MOUTH EVERY DAY    cyproheptadine (PERIACTIN) 4 mg tablet Take 1 Tab by mouth three (3) times daily as needed. Indications: Pruritus of Skin    glimepiride (AMARYL) 4 mg tablet Take 4 mg by mouth daily.  alendronate (FOSAMAX) 70 mg tablet Take 1 Tab by mouth every seven (7) days. No current facility-administered medications for this visit. SYSTEM REVIEW NOT RELATED TO LIVER DISEASE OR REVIEWED ABOVE:  Constitution systems: Weight gain with steroids. Eyes: Negative for visual changes. ENT: Negative for sore throat, painful swallowing. Respiratory: Negative for cough, hemoptysis, SOB. Cardiology: Negative for chest pain, palpitations. GI:  Negative for constipation or diarrhea. : Negative for urinary frequency, dysuria, hematuria, nocturia. Skin: Negative for rash. Hematology: Negative for easy bruising, blood clots. Musculo-skelatal: Severe cellulitis of right hand requried surgery and is now casted. The back pain is improved with the high dose steroids.   Neurologic: Negative for headaches, dizziness, vertigo, memory problems not related to HE. Psychology: Negative for anxiety, depression. FAMILY HISTORY:  There is no family history of liver disease. SOCIAL HISTORY:  The patient is . There are 2 children and 2 grandchildren. The patient has never used tobacco products. The patient has never consumed significant amounts of alcohol. The patient used to work for [employer] and then as an  for [employer].  She has not worked since the liver transplant. PHYSICAL EXAMINATION:    Visit Vitals    /65    Pulse 74    Temp 99.3 °F (37.4 °C) (Tympanic)    Ht 5' 5\" (1.651 m)    Wt 199 lb (90.3 kg)    SpO2 97%    BMI 33.12 kg/m2       General: Appears healthy. No acute distress. Eyes: Sclera anicteric. ENT: No oral lesions. Thyroid normal.  Nodes: No adenopathy. Skin: No spider angiomata. No jaundice. No palmar erythema. Respiratory: Lungs clear to auscultation. Cardiovascular: Regular heart rate. No murmurs. No JVD. Abdomen:   Well healed liver transplant incision. Soft non-tender. Liver size normal to percussion/palpation. Spleen not palpable. No obvious ascites. Extremities: No lower edema. No muscle wasting. No gross arthritic changes. Neurologic:  Alert and oriented. Cranial nerves grossly intact. No asterixsis.     LAB STUDIES:  Liver [place] of [address] [place] [address] 3/2/2018 2/12/2018   WBC 4.0 - 11.0 K/uL 4.9 5.2   ANC 1.8 - 8.0 K/UL     HGB 11.7 - 16.0 g/dL 10.1 (L) 10.8 (L)    - 440 K/uL 228 195   INR 0.8 - 1.2       AST 10 - 37 U/L 66 (H) 35   ALT 5 - 40 U/L 10 8   Alk Phos 40 - 120 U/L 229 (H) 188 (H)   Bili, Total 0.2 - 1.2 mg/dL 0.1 (L) 0.2   Bili, Direct 0.0 - 0.3 mg/dL <0.2 <0.2   Albumin 3.5 - 5.0 g/dL 4.2 3.7   BUN 6 - 22 mg/dL 23 (H) 19   Creat 0.8 - 1.4 mg/dL 1.6 (H) 1.4   Na 133 - 145 mmol/L 143 140   K 3.5 - 5.5 mmol/L 4.3 3.7   Cl 98 - 110 mmol/L 103 100   CO2 20 - 32 mmol/L 23 21   Glucose 70 - 99 mg/dL 146 (H) 134 (H)   Magnesium 1.6 - 2.5 mg/dL         Liver Virology and Transplant Immune Suppression Latest Ref Rng & Units 11/22/2017   Sirolimus Level 3.0 - 20.0 ng/mL 5.8     Liver Virology and Transplant Immune Suppression Latest Ref Rng & Units 11/13/2017   Sirolimus Level 3.0 - 20.0 ng/mL 4.4     SEROLOGIES:  2/2012. HAV total negative, HBsAntigen negative, anti-HBcore negative, anti-HBsurface negative, anti-HCV negative, Ferritin 20, NEVA negative, ASMA negative, AMA negative, ceruloplsmin 34, alpha-1-antitrypsin 195, A1AT phenotype MM.  2/2013. Ferritin 434, ASMA weak positive, CMV IgG positive, EBV IgG positive, anti-HIV negative, HBA1C 5.1    LIVER HISTOLOGY:  11/2017. Slides reviewed by ZANDER. Acute graft rejection. ENDOSCOPIC PROCEDURES:  1/2012. EGD by Dr Imelda Chiu. Esophageal varices. RADIOLOGY:  1/2012. CT scan abdomen with and without IV contrast.  Changes consistent with cirrhosis. No liver mass lesions. No dilated bile ducts. No bile duct strictures. Varices. Generalized ascites. 07/2012:  Ultrasound of the liver. Echogenic consistent with chronic liver disease, cirrhosis. No liver mass lesions. No ascites  11/2012: Ultrasound of the liver. Echogenic consistent with chronic liver disease, cirrhosis. No liver mass lesions. Small amount ascites present. 1/2013. Ultrasound of liver. Echogenic consistent with cirrhosis. No liver mass lesions. No dilated bile ducts. Mild ascites. 11/2013. Ultrasound of liver. Normal appearing liver. No liver mass lesions. 12/2013:  MRI of abdomen. Questionable small focal stenosis of distal common hepatic duct. Focal stenosis in mid-portal vein. OTHER TESTING:.   2/2013. ETOH negative. 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 This was treated with high dose prednisone which is now being tapered.   Will continue prednisone at 5 mg every day for another month and then taper to off. Sirolimus dose is 3 mg every day. The sirolimus level was 8. Cellcept was increased to 1000 mg BID. Chronic renal insufficiency from immune suppression. Creatinine is in the 1.4-1.6 mg range. Severe cellulitus of the right hand developed when she was being treated for rejection with high dose immune suppression. This has required surgical debridement. The hand and forarm are now casted. The patient was counseled regarding alcohol use. Osteoporosis is being treated with fosmax. She had a DEXA scan in October 2015 that continued to show osteoporosis, but increased BMD.     Patient should receive annual flu-vaccine from their primary care provider. Live vaccines should not be administered. She states that she received her flu shot in September 2017. Laboratory studies should be performed every 4 weeks for now. [facility] 87 in 4 weeks.     Bismark Abrams MD  Liver [place] of [address] [place] [address], [address] [place] [address], [address] [place] [address] Street [address]  [phone]

## 2018-04-18 NOTE — MR AVS SNAPSHOT
[address] [place] [address] 
[phone] Patient: Aleah Shah MRN: [id number] ADX:6/48/2798 Visit Information Date & Time Provider Department Dept. Phone Encounter #  
 4/18/2018 11:00 AM Antonio Marroquin  E Nikole Ivone of  [address] [id number] Upcoming Health Maintenance Date Due Hepatitis C Screening 1955 FOOT EXAM Q1 9/24/1963 MICROALBUMIN Q1 9/24/1963 EYE EXAM RETINAL OR DILATED Q1 9/24/1963 DTaP/Tdap/Td series (1 - Tdap) 9/24/1974 FOBT Q 1 YEAR AGE 50-75 9/24/2003 Pneumococcal 19-64 Highest Risk (2 of 3 - PCV13) 1/1/2013 ZOSTER VACCINE AGE 60> 7/24/2013 PAP AKA CERVICAL CYTOLOGY 10/18/2014 HEMOGLOBIN A1C Q6M 12/15/2016 LIPID PANEL Q1 1/11/2018 MEDICARE YEARLY EXAM 3/14/2018 BREAST CANCER SCRN MAMMOGRAM 11/22/2018 Allergies as of 4/18/2018  Review Complete On: 4/18/2018 By: Lexie Graham LPN Severity Noted Reaction Type Reactions Tape [Adhesive] High 11/09/2017    Other (comments) Pulls skin off Percocet [Oxycodone-acetaminophen]  01/27/2013    Shortness of Breath, Itching, Other (comments) \"Burning skin\" Statins-hmg-coa Reductase Inhibitors  03/05/2018    Other (comments) liver Current Immunizations  Reviewed on 4/11/2013 Name Date Influenza Vaccine 1/21/2013 Pneumococcal Vaccine (Unspecified Type) 1/1/2012 Not reviewed this visit Vitals BP Pulse Temp Height(growth percentile) Weight(growth percentile) SpO2  
 144/65 74 99.3 °F (37.4 °C) (Tympanic) 5' 5\" (1.651 m) 199 lb (90.3 kg) 97% BMI OB Status Smoking Status 33.12 kg/m2 Postmenopausal Never Smoker BMI and BSA Data Body Mass Index Body Surface Area  
 33.12 kg/m 2 2.04 m 2 Preferred Pharmacy Pharmacy Name Phone  [id number] [place] [address] Susy Wiseman [phone] Your Updated Medication List  
  
   
This list is accurate as of 4/18/18 11:44 AM.  Always use your most recent med list.  
  
  
  
  
 alendronate 70 mg tablet Commonly known as:  FOSAMAX Take 1 Tab by mouth every seven (7) days. amoxicillin 500 mg capsule Commonly known as:  AMOXIL Take 4 capsules by mouth 1 hour prior to dental procedure  
  
 cyproheptadine 4 mg tablet Commonly known as:  PERIACTIN Take 1 Tab by mouth three (3) times daily as needed. Indications: Pruritus of Skin  
  
 furosemide 40 mg tablet Commonly known as:  LASIX TAKE ONE TABLET BY MOUTH EVERY DAY  
  
 glimepiride 4 mg tablet Commonly known as:  AMARYL Take 4 mg by mouth daily. insulin  unit/mL (3 mL) Inpn Commonly known as:  HUMULIN N  
by SubCUTAneous route. INSULIN PEN NEEDLE  
by Does Not Apply route. metoprolol tartrate 25 mg tablet Commonly known as:  LOPRESSOR Take 1 Tab by mouth two (2) times a day. mycophenolate 500 mg tablet Commonly known as:  CELLCEPT Take 2 Tabs by mouth two (2) times a day. predniSONE 5 mg tablet Commonly known as:  Chris [place] Take 1 Tab by mouth daily. sirolimus 1 mg tablet Commonly known as:  RAPAMUNE Take 3 mg by mouth daily. VYVANSE PO Take  by mouth. Introducing [facility] & HEALTH SERVICES! [facility] introduces [employer] patient portal. Now you can access parts of your medical record, email your doctor's office, and request medication refills online. 1. In your internet browser, go to [email or web address] 2. Click on the First Time User? Click Here link in the Sign In box. You will see the New Member Sign Up page. 3. Enter your [employer] Access Code exactly as it appears below. You will not need to use this code after youve completed the sign-up process. If you do not sign up before the expiration date, you must request a new code. · [employer] Access Code: [email or web address] Expires: 6/3/2018  3:01 PM 
 
4. Enter the last four digits of your Social Security Number (xxxx) and Date of Birth (mm/dd/yyyy) as indicated and click Submit. You will be taken to the next sign-up page. 5. Create a [employer].HR ID. This will be your [employer] login ID and cannot be changed, so think of one that is secure and easy to remember. 6. Create a [employer] password. You can change your password at any time. 7. Enter your Password Reset Question and Answer. This can be used at a later time if you forget your password. 8. Enter your e-mail address. You will receive e-mail notification when new information is available in [address] 9. Click Sign Up. You can now view and download portions of your medical record. 10. Click the Download Summary menu link to download a portable copy of your medical information. If you have questions, please visit the Frequently Asked Questions section of the [employer] website. Remember, [employer] is NOT to be used for urgent needs. For medical emergencies, dial 911. Now available from your iPhone and Android! Please provide this summary of care documentation to your next provider. Your primary care clinician is listed as Angelique Whalen. If you have any questions after today's visit, please call [phone].

## 2018-04-23 LAB
A-G RATIO,AGRAT: 1.4 RATIO (ref 1.1–2.6)
ALBUMIN SERPL-MCNC: 3.8 G/DL (ref 3.5–5)
ALP SERPL-CCNC: 158 U/L (ref 40–120)
ALT SERPL-CCNC: 35 U/L (ref 5–40)
AST SERPL W P-5'-P-CCNC: 35 U/L (ref 10–37)
BILIRUB SERPL-MCNC: 0.2 MG/DL (ref 0.2–1.2)
BILIRUBIN, DIRECT,CBIL: <0.2 MG/DL (ref 0–0.3)
ERYTHROCYTE [DISTWIDTH] IN BLOOD BY AUTOMATED COUNT: 13.6 % (ref 10–15.5)
GLOBULIN,GLOB: 2.7 G/DL (ref 2–4)
HCT VFR BLD AUTO: 38.6 % (ref 35.1–48)
HGB BLD-MCNC: 12.3 G/DL (ref 11.7–16)
MCH RBC QN AUTO: 28 PG (ref 26–34)
MCHC RBC AUTO-ENTMCNC: 32 G/DL (ref 31–36)
MCV RBC AUTO: 87 FL (ref 80–95)
PLATELET # BLD AUTO: 210 K/UL (ref 140–440)
PMV BLD AUTO: 10.7 FL (ref 9–13)
PROT SERPL-MCNC: 6.5 G/DL (ref 6.2–8.1)
RAPAMYCIN (SIROLIMUS): 8 NG/ML (ref 3–20)
RBC # BLD AUTO: 4.44 M/UL (ref 3.8–5.2)
WBC # BLD AUTO: 5.3 K/UL (ref 4–11)

## 2018-04-24 ENCOUNTER — PATIENT OUTREACH (OUTPATIENT)
Dept: HEMATOLOGY | Age: 63
End: 2018-04-24

## 2018-04-24 NOTE — PROGRESS NOTES
Phone call placed to patient. Received VM. Left message notifying patient most recent labs are stable. Patient advised to continue same doses of medications. Patient notified lab frequency can be decreased to every 2 weeks now: next draw should be on 5/7. Noted lab has not obtained BMP despite re-faxing standing orders. Mailed requisition to patient and asked that she provide lab slip when she presents for next blood draw.

## 2018-05-08 LAB
A-G RATIO,AGRAT: 1.6 RATIO (ref 1.1–2.6)
ALBUMIN SERPL-MCNC: 4.1 G/DL (ref 3.5–5)
ALP SERPL-CCNC: 195 U/L (ref 40–120)
ALT SERPL-CCNC: 61 U/L (ref 5–40)
ANION GAP SERPL CALC-SCNC: 18 MMOL/L
AST SERPL W P-5'-P-CCNC: 36 U/L (ref 10–37)
BILIRUB SERPL-MCNC: 0.2 MG/DL (ref 0.2–1.2)
BILIRUBIN, DIRECT,CBIL: <0.2 MG/DL (ref 0–0.3)
BUN SERPL-MCNC: 27 MG/DL (ref 6–22)
CALCIUM SERPL-MCNC: 9.3 MG/DL (ref 8.4–10.5)
CHLORIDE SERPL-SCNC: 100 MMOL/L (ref 98–110)
CO2 SERPL-SCNC: 26 MMOL/L (ref 20–32)
CREAT SERPL-MCNC: 1.7 MG/DL (ref 0.8–1.4)
ERYTHROCYTE [DISTWIDTH] IN BLOOD BY AUTOMATED COUNT: 13.4 % (ref 10–15.5)
GFRAA, 66117: 36.2
GFRNA, 66118: 29.9
GLOBULIN,GLOB: 2.6 G/DL (ref 2–4)
GLUCOSE SERPL-MCNC: 189 MG/DL (ref 70–99)
HCT VFR BLD AUTO: 41.2 % (ref 35.1–48)
HGB BLD-MCNC: 13 G/DL (ref 11.7–16)
MCH RBC QN AUTO: 27 PG (ref 26–34)
MCHC RBC AUTO-ENTMCNC: 32 G/DL (ref 31–36)
MCV RBC AUTO: 86 FL (ref 80–95)
PLATELET # BLD AUTO: 195 K/UL (ref 140–440)
PMV BLD AUTO: 10.7 FL (ref 9–13)
POTASSIUM SERPL-SCNC: 4.8 MMOL/L (ref 3.5–5.5)
PROT SERPL-MCNC: 6.7 G/DL (ref 6.2–8.1)
RAPAMYCIN (SIROLIMUS): 10.8 NG/ML (ref 3–20)
RBC # BLD AUTO: 4.8 M/UL (ref 3.8–5.2)
SODIUM SERPL-SCNC: 144 MMOL/L (ref 133–145)
WBC # BLD AUTO: 4.8 K/UL (ref 4–11)

## 2018-05-21 LAB
A-G RATIO,AGRAT: 1.5 RATIO (ref 1.1–2.6)
ALBUMIN SERPL-MCNC: 4 G/DL (ref 3.5–5)
ALP SERPL-CCNC: 144 U/L (ref 40–120)
ALT SERPL-CCNC: 32 U/L (ref 5–40)
AST SERPL W P-5'-P-CCNC: 32 U/L (ref 10–37)
BILIRUB SERPL-MCNC: 0.2 MG/DL (ref 0.2–1.2)
BILIRUBIN, DIRECT,CBIL: <0.2 MG/DL (ref 0–0.3)
ERYTHROCYTE [DISTWIDTH] IN BLOOD BY AUTOMATED COUNT: 13 % (ref 10–15.5)
GLOBULIN,GLOB: 2.6 G/DL (ref 2–4)
HCT VFR BLD AUTO: 39.7 % (ref 35.1–48)
HGB BLD-MCNC: 13.2 G/DL (ref 11.7–16)
MCH RBC QN AUTO: 28 PG (ref 26–34)
MCHC RBC AUTO-ENTMCNC: 33 G/DL (ref 31–36)
MCV RBC AUTO: 83 FL (ref 80–95)
PLATELET # BLD AUTO: 215 K/UL (ref 140–440)
PMV BLD AUTO: 10.5 FL (ref 9–13)
PROT SERPL-MCNC: 6.6 G/DL (ref 6.2–8.1)
RAPAMYCIN (SIROLIMUS): 12.1 NG/ML (ref 3–20)
RBC # BLD AUTO: 4.79 M/UL (ref 3.8–5.2)
WBC # BLD AUTO: 4.9 K/UL (ref 4–11)

## 2018-06-04 LAB
A-G RATIO,AGRAT: 1.5 RATIO (ref 1.1–2.6)
ALBUMIN SERPL-MCNC: 4.1 G/DL (ref 3.5–5)
ALP SERPL-CCNC: 240 U/L (ref 40–120)
ALT SERPL-CCNC: 90 U/L (ref 5–40)
ANION GAP SERPL CALC-SCNC: 16 MMOL/L
AST SERPL W P-5'-P-CCNC: 73 U/L (ref 10–37)
BILIRUB SERPL-MCNC: 0.3 MG/DL (ref 0.2–1.2)
BILIRUBIN, DIRECT,CBIL: <0.2 MG/DL (ref 0–0.3)
BUN SERPL-MCNC: 21 MG/DL (ref 6–22)
CALCIUM SERPL-MCNC: 9 MG/DL (ref 8.4–10.5)
CHLORIDE SERPL-SCNC: 98 MMOL/L (ref 98–110)
CO2 SERPL-SCNC: 27 MMOL/L (ref 20–32)
CREAT SERPL-MCNC: 1.5 MG/DL (ref 0.8–1.4)
ERYTHROCYTE [DISTWIDTH] IN BLOOD BY AUTOMATED COUNT: 13 % (ref 10–15.5)
GFRAA, 66117: 41.4
GFRNA, 66118: 34.2
GLOBULIN,GLOB: 2.7 G/DL (ref 2–4)
GLUCOSE SERPL-MCNC: 189 MG/DL (ref 70–99)
HCT VFR BLD AUTO: 40 % (ref 35.1–48)
HGB BLD-MCNC: 13.1 G/DL (ref 11.7–16)
MCH RBC QN AUTO: 27 PG (ref 26–34)
MCHC RBC AUTO-ENTMCNC: 33 G/DL (ref 31–36)
MCV RBC AUTO: 83 FL (ref 80–95)
PLATELET # BLD AUTO: 193 K/UL (ref 140–440)
PMV BLD AUTO: 10.8 FL (ref 9–13)
POTASSIUM SERPL-SCNC: 4.1 MMOL/L (ref 3.5–5.5)
PROT SERPL-MCNC: 6.8 G/DL (ref 6.2–8.1)
RAPAMYCIN (SIROLIMUS): 11.9 NG/ML (ref 3–20)
RBC # BLD AUTO: 4.8 M/UL (ref 3.8–5.2)
SODIUM SERPL-SCNC: 141 MMOL/L (ref 133–145)
WBC # BLD AUTO: 5.3 K/UL (ref 4–11)

## 2018-06-14 ENCOUNTER — PATIENT OUTREACH (OUTPATIENT)
Dept: HEMATOLOGY | Age: 63
End: 2018-06-14

## 2018-06-14 DIAGNOSIS — R79.1 ABNORMAL COAGULATION PROFILE: ICD-10-CM

## 2018-06-14 DIAGNOSIS — Z94.4 H/O LIVER TRANSPLANT (HCC): Primary | ICD-10-CM

## 2018-06-14 LAB
A-G RATIO,AGRAT: 1.4 RATIO (ref 1.1–2.6)
ABSOLUTE LYMPHOCYTE COUNT, 10803: 2.2 K/UL (ref 1–4.8)
ALBUMIN SERPL-MCNC: 3.9 G/DL (ref 3.5–5)
ALP SERPL-CCNC: 165 U/L (ref 40–120)
ALT SERPL-CCNC: 52 U/L (ref 5–40)
ANION GAP SERPL CALC-SCNC: 17 MMOL/L
AST SERPL W P-5'-P-CCNC: 35 U/L (ref 10–37)
BASOPHILS # BLD: 0 K/UL (ref 0–0.2)
BASOPHILS NFR BLD: 0 % (ref 0–2)
BILIRUB SERPL-MCNC: 0.2 MG/DL (ref 0.2–1.2)
BILIRUBIN, DIRECT,CBIL: <0.2 MG/DL (ref 0–0.3)
BUN SERPL-MCNC: 25 MG/DL (ref 6–22)
CALCIUM SERPL-MCNC: 9 MG/DL (ref 8.4–10.5)
CHLORIDE SERPL-SCNC: 99 MMOL/L (ref 98–110)
CO2 SERPL-SCNC: 23 MMOL/L (ref 20–32)
CREAT SERPL-MCNC: 1.5 MG/DL (ref 0.8–1.4)
EOSINOPHIL # BLD: 0 K/UL (ref 0–0.5)
EOSINOPHIL NFR BLD: 1 % (ref 0–6)
ERYTHROCYTE [DISTWIDTH] IN BLOOD BY AUTOMATED COUNT: 13.4 % (ref 10–15.5)
GFRAA, 66117: 41.4
GFRNA, 66118: 34.2
GLOBULIN,GLOB: 2.7 G/DL (ref 2–4)
GLUCOSE SERPL-MCNC: 199 MG/DL (ref 70–99)
GRANULOCYTES,GRANS: 41 % (ref 40–75)
HCT VFR BLD AUTO: 39.2 % (ref 35.1–48)
HGB BLD-MCNC: 12.9 G/DL (ref 11.7–16)
LYMPHOCYTES, LYMLT: 49 % (ref 20–45)
MCH RBC QN AUTO: 27 PG (ref 26–34)
MCHC RBC AUTO-ENTMCNC: 33 G/DL (ref 31–36)
MCV RBC AUTO: 82 FL (ref 80–95)
MONOCYTES # BLD: 0.4 K/UL (ref 0.1–1)
MONOCYTES NFR BLD: 9 % (ref 3–12)
NEUTROPHILS # BLD AUTO: 1.9 K/UL (ref 1.8–7.7)
PLATELET # BLD AUTO: 203 K/UL (ref 140–440)
PMV BLD AUTO: 11 FL (ref 9–13)
POTASSIUM SERPL-SCNC: 3.9 MMOL/L (ref 3.5–5.5)
PROT SERPL-MCNC: 6.6 G/DL (ref 6.2–8.1)
RAPAMYCIN (SIROLIMUS): 9.9 NG/ML (ref 3–20)
RBC # BLD AUTO: 4.76 M/UL (ref 3.8–5.2)
SODIUM SERPL-SCNC: 139 MMOL/L (ref 133–145)
WBC # BLD AUTO: 4.6 K/UL (ref 4–11)

## 2018-06-20 LAB
A-G RATIO,AGRAT: 1.6 RATIO (ref 1.1–2.6)
ALBUMIN SERPL-MCNC: 3.8 G/DL (ref 3.5–5)
ALP SERPL-CCNC: 163 U/L (ref 40–120)
ALT SERPL-CCNC: 64 U/L (ref 5–40)
ANION GAP SERPL CALC-SCNC: 18 MMOL/L
APTT PPP: 21 SEC (ref 22–36)
AST SERPL W P-5'-P-CCNC: 48 U/L (ref 10–37)
BILIRUB SERPL-MCNC: 0.3 MG/DL (ref 0.2–1.2)
BUN SERPL-MCNC: 26 MG/DL (ref 6–22)
CALCIUM SERPL-MCNC: 8.7 MG/DL (ref 8.4–10.5)
CHLORIDE SERPL-SCNC: 101 MMOL/L (ref 98–110)
CO2 SERPL-SCNC: 23 MMOL/L (ref 20–32)
CREAT SERPL-MCNC: 1.4 MG/DL (ref 0.8–1.4)
GFRAA, 66117: 44.8
GFRNA, 66118: 36.9
GLOBULIN,GLOB: 2.4 G/DL (ref 2–4)
GLUCOSE SERPL-MCNC: 152 MG/DL (ref 70–99)
INR PPP: 0.9 (ref 0.89–1.29)
POTASSIUM SERPL-SCNC: 4.1 MMOL/L (ref 3.5–5.5)
PROT SERPL-MCNC: 6.2 G/DL (ref 6.2–8.1)
PROTHROMBIN TIME: 9.4 SEC (ref 9–13)
RAPAMYCIN (SIROLIMUS): 8.9 NG/ML (ref 3–20)
SODIUM SERPL-SCNC: 142 MMOL/L (ref 133–145)

## 2018-06-25 ENCOUNTER — HOSPITAL ENCOUNTER (OUTPATIENT)
Dept: ULTRASOUND IMAGING | Age: 63
Discharge: HOME | End: 2018-06-25
Attending: INTERNAL MEDICINE
Payer: MEDICARE

## 2018-06-25 ENCOUNTER — HOSPITAL ENCOUNTER (OUTPATIENT)
Age: 63
Setting detail: OUTPATIENT SURGERY
Discharge: HOME | End: 2018-06-25
Attending: INTERNAL MEDICINE | Admitting: INTERNAL MEDICINE
Payer: MEDICARE

## 2018-06-25 ENCOUNTER — APPOINTMENT (OUTPATIENT)
Dept: ULTRASOUND IMAGING | Age: 63
End: 2018-06-25
Attending: INTERNAL MEDICINE
Payer: MEDICARE

## 2018-06-25 VITALS
BODY MASS INDEX: 32.58 KG/M2 | HEIGHT: 66 IN | DIASTOLIC BLOOD PRESSURE: 103 MMHG | SYSTOLIC BLOOD PRESSURE: 118 MMHG | HEART RATE: 90 BPM | OXYGEN SATURATION: 95 % | RESPIRATION RATE: 18 BRPM | WEIGHT: 202.7 LBS | TEMPERATURE: 98.5 F

## 2018-06-25 DIAGNOSIS — R79.89 ELEVATED LFTS: ICD-10-CM

## 2018-06-25 LAB
GLUCOSE BLD STRIP.AUTO-MCNC: 123 MG/DL (ref 70–110)
GLUCOSE BLD STRIP.AUTO-MCNC: 159 MG/DL (ref 70–110)

## 2018-06-25 PROCEDURE — 74011000250 HC RX REV CODE- 250: Performed by: INTERNAL MEDICINE

## 2018-06-25 PROCEDURE — 76040000019: Performed by: INTERNAL MEDICINE

## 2018-06-25 PROCEDURE — 88313 SPECIAL STAINS GROUP 2: CPT | Performed by: INTERNAL MEDICINE

## 2018-06-25 PROCEDURE — 76705 ECHO EXAM OF ABDOMEN: CPT

## 2018-06-25 PROCEDURE — 74011250636 HC RX REV CODE- 250/636: Performed by: INTERNAL MEDICINE

## 2018-06-25 PROCEDURE — 88307 TISSUE EXAM BY PATHOLOGIST: CPT | Performed by: INTERNAL MEDICINE

## 2018-06-25 PROCEDURE — 77030013826 HC NDL BIOP MAXCOR BARD -B: Performed by: INTERNAL MEDICINE

## 2018-06-25 PROCEDURE — 82962 GLUCOSE BLOOD TEST: CPT

## 2018-06-25 PROCEDURE — 76942 ECHO GUIDE FOR BIOPSY: CPT

## 2018-06-25 PROCEDURE — 77030018836 HC SOL IRR NACL ICUM -A: Performed by: INTERNAL MEDICINE

## 2018-06-25 RX ORDER — SODIUM CHLORIDE 0.9 % (FLUSH) 0.9 %
5-10 SYRINGE (ML) INJECTION AS NEEDED
Status: DISCONTINUED | OUTPATIENT
Start: 2018-06-25 | End: 2018-06-25 | Stop reason: HOSPADM

## 2018-06-25 RX ORDER — SODIUM CHLORIDE 9 MG/ML
150 INJECTION, SOLUTION INTRAVENOUS CONTINUOUS
Status: DISCONTINUED | OUTPATIENT
Start: 2018-06-25 | End: 2018-06-25 | Stop reason: HOSPADM

## 2018-06-25 RX ORDER — SODIUM CHLORIDE 0.9 % (FLUSH) 0.9 %
5-10 SYRINGE (ML) INJECTION EVERY 8 HOURS
Status: DISCONTINUED | OUTPATIENT
Start: 2018-06-25 | End: 2018-06-25 | Stop reason: HOSPADM

## 2018-06-25 RX ORDER — HYDROMORPHONE HYDROCHLORIDE 1 MG/ML
1 INJECTION, SOLUTION INTRAMUSCULAR; INTRAVENOUS; SUBCUTANEOUS
Status: DISCONTINUED | OUTPATIENT
Start: 2018-06-25 | End: 2018-06-25 | Stop reason: HOSPADM

## 2018-06-25 RX ORDER — LIDOCAINE HYDROCHLORIDE 10 MG/ML
10 INJECTION INFILTRATION; PERINEURAL ONCE
Status: COMPLETED | OUTPATIENT
Start: 2018-06-25 | End: 2018-06-25

## 2018-06-25 RX ORDER — METOPROLOL TARTRATE 50 MG/1
25 TABLET ORAL 2 TIMES DAILY
COMMUNITY

## 2018-06-25 RX ORDER — ONDANSETRON 2 MG/ML
4 INJECTION INTRAMUSCULAR; INTRAVENOUS
Status: DISCONTINUED | OUTPATIENT
Start: 2018-06-25 | End: 2018-06-25 | Stop reason: HOSPADM

## 2018-06-25 RX ADMIN — ONDANSETRON 4 MG: 2 INJECTION INTRAMUSCULAR; INTRAVENOUS at 09:11

## 2018-06-25 RX ADMIN — SODIUM CHLORIDE 150 ML/HR: 900 INJECTION, SOLUTION INTRAVENOUS at 09:35

## 2018-06-25 RX ADMIN — Medication 1 MG: at 09:10

## 2018-06-25 NOTE — PROCEDURES
70 Calvin De Souza MD, [address], [place], [place]       SILVA Shea PA-C [facility], [facility]   SILVA Cates NP [address] [facility] 136    at [address], [address] [employer]  22.    [phone]    FAX: [address] [place] [address], [address] [facility], [address]    [phone]    FAX: [phone]       LIVER BIOPSY PROCEDURE NOTE    Graciela Dailey  1955    INDICATIONS/PRE-OPERATIVE  DIAGNOSIS:   Elevated liver enzymes         Post-liver transplant since 2013         Prior rejection 11/2017. Treated. Suspect NAFLD    : Pastora Andrews MD    SEDATION: 1% Lidocaine injection 10 ml    PROCEDURE:  Informed consent to perform the procedure was obtained from the patient. The patient was positioned on the edge of the stretcher lying flat in the supine position. Ultrasound was utilized to image the liver. The diaphragm and any major mass lesion or vascular structures within the liver were identified. An appropriate site for liver biopsy was identified. The distance from the surface of the skin to the liver capsule was 4 cm. This area was prepped with betadine and draped in sterile fashion. The skin was infiltrated with 1% lidocaine. The deeper subcutanous tissues and liver capsule overlying the biopsy site were then infiltrated with 1% lidocaine until appropriate anesthesia was obtained. A small incision was made in the skin so the biopsy devise could be easily inserted. A total of 3 passes with the 18 gauge Bard biopsy devise was then made into the liver. Core(s) of liver tissue totaling 5 cm in length were obtained and placed into tissue fixative.   A band aid was placed over the biopsy site. The patient was then repositioned on the right side and transported to the recovery area on the stretcher for routine monitoring until discharge. The specimen was sent to pathology for processing via the normal transport mechanism. SPECIMEN REMOVED: Liver    ESTIMATED BLOOD LOSS: Negligible.       POST-OPERATIVE DIAGNOSIS: Same as Pre-operative Diagnosis    Antonio Marroquin MD       6/25/2018  8:13 AM

## 2018-06-25 NOTE — PERIOP NOTES
Patient continues to have slight pain post Dilaudid and still feels nauseated. Says she feels like she does when her blood sugar is low, glucose checked - 159; BP low, texted Dr. Bronson Fernandez and updated to patient status; 1000 CC Normal Saline up to infuse as a bolus, O2 sats keep dropping into the 80's O2 started at 2 lpm,.

## 2018-06-25 NOTE — DISCHARGE INSTRUCTIONS
70 Calvin De Souza MD, [address], [place] [facility], [place]       SILVA Virk PA-C [facility] Macle[facility], [facility]   SILVA Angulo NP [address] [facility] 136    at [address], [facility] [employer]  [address].    [phone]    FAX: [address] [place] [address], [address]    [phone]    FAX: [phone]       LIVER BIOPSY DISCHARGE INSTRUCTIONS      Raysa Cesar  1955  Date: 6/25/2018    DIET:    Mary Ann Ruffin may resume your previous diet. ACTIVITIES:  Rest quietly the rest of today. You should not lift any objects more than 20 pounds for the next 2 days. If you work sitting down without strenuous activity you may return to work tomorrow. If you exert yourself or do heavy lifting at work you should take tomorrow off. Do not drive or operate hazardous machinery for 12 hours. SPECIAL INSTRUCTIONS:  Do not use any aspirin or non-steroidal (Motin, Advil, Naproxen, etc) pain medications for the next 3 days. You may use extra-strength Tylenol (acetaminophen) if you experience pain or discomfort later today. Call the [address] office if you experience any of the following:  Persistent or severe abdominal pain. Persistent or severe abdominal distention. Fever and chills   Nausea and vomiting. New or unusual symptoms. Follow-up care: You should have a follow up appointment with Dr. Alvaro Youssef to review the results of the liver biopsy results in 2 weeks. If you do not have an appointment please call the office at the number listed above to schedule this. Other instructions:    If you have any problems or questions call the [address] [place] [address] office at the phone number listed above. DISCHARGE SUMMARY from Nurse: The following personal items collected during your admission are returned to you:   Dental Appliance: Dental Appliances: None  Vision: Visual Aid: Glasses (reading)  Hearing Aid:    Jewelry:    Clothing:    Other Valuables:    Valuables sent to safe:        DISCHARGE SUMMARY from Nurse    The following personal items collected during your admission are returned to you:   Dental Appliance: Dental Appliances: None  Vision: Visual Aid: Glasses (reading)  Hearing Aid:    Jewelry:    Clothing:    Other Valuables:    Valuables sent to safe:              PATIENT INSTRUCTIONS:    After general anesthesia or intravenous sedation, for 24 hours or while taking prescription Narcotics:  · Limit your activities  · Do not drive and operate hazardous machinery  · Do not make important personal or business decisions  · Do  not drink alcoholic beverages  · If you have not urinated within 8 hours after discharge, please contact your surgeon on call. Report the following to your surgeon:  · Excessive pain, swelling, redness or odor of or around the surgical area  · Temperature over 100.5  · Nausea and vomiting lasting longer than 4 hours or if unable to take medications  · Any signs of decreased circulation or nerve impairment to extremity: change in color, persistent  numbness, tingling, coldness or increase pain  · Any questions      Follow-up Appointments   Procedures    FOLLOW UP VISIT Appointment in: One Week     Standing Status:   Standing     Number of Occurrences:   1     Order Specific Question:   Appointment in     Answer: One Week       What to do at Home:  Recommended activity: as above,     If you experience any of the following symptoms as above, please follow up with Dr. Khai Lomax. *  Please give a list of your current medications to your Primary Care Provider.     *  Please update this list whenever your medications are discontinued, doses are changed, or new medications (including over-the-counter products) are added. *  Please carry medication information at all times in case of emergency situations. These are general instructions for a healthy lifestyle:    No smoking/ No tobacco products/ Avoid exposure to second hand smoke    Surgeon General's Warning:  Quitting smoking now greatly reduces serious risk to your health. Obesity, smoking, and sedentary lifestyle greatly increases your risk for illness    A healthy diet, regular physical exercise & weight monitoring are important for maintaining a healthy lifestyle    You may be retaining fluid if you have a history of heart failure or if you experience any of the following symptoms:  Weight gain of 3 pounds or more overnight or 5 pounds in a week, increased swelling in our hands or feet or shortness of breath while lying flat in bed. Please call your doctor as soon as you notice any of these symptoms; do not wait until your next office visit. Recognize signs and symptoms of STROKE:    F-face looks uneven    A-arms unable to move or move unevenly    S-speech slurred or non-existent    T-time-call 911 as soon as signs and symptoms begin-DO NOT go       Back to bed or wait to see if you get better-TIME IS BRAIN. The discharge information has been reviewed with the . The patient and caregiver verbalized understanding. Warning Signs of HEART ATTACK     Call 911 if you have these symptoms:   Chest discomfort. Most heart attacks involve discomfort in the center of the chest that lasts more than a few minutes, or that goes away and comes back. It can feel like uncomfortable pressure, squeezing, fullness, or pain.  Discomfort in other areas of the upper body. Symptoms can include pain or discomfort in one or both arms, the back, neck, jaw, or stomach.  Shortness of breath with or without chest discomfort.    Other signs may include breaking out in a cold sweat, nausea, or lightheadedness. Don't wait more than five minutes to call 911 - MINUTES MATTER! Fast action can save your life. Calling 911 is almost always the fastest way to get lifesaving treatment. Emergency Medical Services staff can begin treatment when they arrive -- up to an hour sooner than if someone gets to the hospital by car. The discharge information has been reviewed with the patient and caregiver. The patient and caregiver verbalized understanding. Discharge medications reviewed with the patient and caregiver and appropriate educational materials and side effects teaching were provided.     Patient armband removed and shredded

## 2018-06-25 NOTE — PERIOP NOTES
Updated Dr. Ling Davila to patient status, liter of fluids in /53, HR - 95 RR-18 and Sats 95 on 1 lpm, patient continues to drop her sats into the 86-87 when on RA.    U/S ordered

## 2018-06-25 NOTE — IP AVS SNAPSHOT
[address] [address] 
[phone] Patient: Jazlyn Bauer MRN: [id number] About your hospitalization You were admitted on:  June 25, 2018 You last received care in the:  [facility] ENDOSCOPY You were discharged on:  June 25, 2018 Why you were hospitalized Your primary diagnosis was:  Not on File Follow-up Information Follow up With Details Comments Contact Info Diann Bowden MD   [address] [facility] [address] [facility] [address] 
[phone] Your Scheduled Appointments Monday July 02, 2018  1:15 PM EDT Follow Up with Darrius Benavides MD  
[address] [facility] ([address])  
 [address] [place] [address]  
[phone] Discharge Orders None A check rah indicates which time of day the medication should be taken. My Medications CHANGE how you take these medications Instructions Each Dose to Equal  
 Morning Noon Evening Bedtime LOPRESSOR 50 mg tablet Generic drug:  metoprolol tartrate What changed:  Another medication with the same name was removed. Continue taking this medication, and follow the directions you see here. Your last dose was: Your next dose is: Take 50 mg by mouth two (2) times a day. 50 mg CONTINUE taking these medications Instructions Each Dose to Equal  
 Morning Noon Evening Bedtime  
 alendronate 70 mg tablet Commonly known as:  FOSAMAX Your last dose was: Your next dose is: Take 1 Tab by mouth every seven (7) days. 70 mg  
    
   
   
   
  
 amoxicillin 500 mg capsule Commonly known as:  AMOXIL Your last dose was: Your next dose is: Take 4 capsules by mouth 1 hour prior to dental procedure cyproheptadine 4 mg tablet Commonly known as:  PERIACTIN Your last dose was: Your next dose is: Take 1 Tab by mouth three (3) times daily as needed. Indications: Pruritus of Skin  
 4 mg  
    
   
   
   
  
 furosemide 40 mg tablet Commonly known as:  LASIX Your last dose was: Your next dose is: TAKE ONE TABLET BY MOUTH EVERY DAY  
     
   
   
   
  
 glimepiride 4 mg tablet Commonly known as:  AMARYL Your last dose was: Your next dose is: Take 4 mg by mouth daily. 4 mg  
    
   
   
   
  
 insulin  unit/mL (3 mL) Inpn Commonly known as:  HUMULIN N Your last dose was: Your next dose is:    
   
   
 by SubCUTAneous route. Indications: PRN  
     
   
   
   
  
 insulin NPH/insulin regular 100 unit/mL (70-30) injection Commonly known as:  NOVOLIN 70/30, HUMULIN 70/30 Your last dose was: Your next dose is:    
   
   
 by SubCUTAneous route. mycophenolate 500 mg tablet Commonly known as:  CELLCEPT Your last dose was: Your next dose is: Take 2 Tabs by mouth two (2) times a day. 1000 mg  
    
   
   
   
  
 predniSONE 5 mg tablet Commonly known as:  Sydnie Smoker Your last dose was: Your next dose is: Take 1 Tab by mouth daily. 5 mg  
    
   
   
   
  
 sirolimus 1 mg tablet Commonly known as:  RAPAMUNE Your last dose was: Your next dose is: Take 3 mg by mouth daily. 3 mg VYVANSE PO Your last dose was: Your next dose is: Take  by mouth. ZOFRAN 4 mg tablet Generic drug:  ondansetron hcl Your last dose was: Your next dose is: Take 4 mg by mouth every eight (8) hours as needed for Nausea. 4 mg Discharge Instructions Charli 59 3644 [facility] Laila Adams MD, Humberto Radford, FAASLD SILVA Minor PA-C Larene Pronto, [facility]   SILVA Sellers NP [address] [facility] 136 
  at [address], [address] Kimberley Cle[place]  22. 
  [phone] FAX: [address] [place] [address] Avenue 
  [facility] 
  [address], [address] [place], [address] [place] [address] - [address] 
  [phone] FAX: [phone] LIVER BIOPSY DISCHARGE INSTRUCTIONS ROZINA/ Garsia 23 1955 Date: 6/25/2018 DIET:   
You may resume your previous diet. ACTIVITIES: 
Rest quietly the rest of today. You should not lift any objects more than 20 pounds for the next 2 days. If you work sitting down without strenuous activity you may return to work tomorrow. If you exert yourself or do heavy lifting at work you should take tomorrow off. Do not drive or operate hazardous machinery for 12 hours. SPECIAL INSTRUCTIONS: 
Do not use any aspirin or non-steroidal (Motin, Advil, Naproxen, etc) pain medications for the next 3 days. You may use extra-strength Tylenol (acetaminophen) if you experience pain or discomfort later today. Call the [facility] [employer] [facility] office if you experience any of the following: 
Persistent or severe abdominal pain. Persistent or severe abdominal distention. Fever and chills Nausea and vomiting. New or unusual symptoms. Follow-up care: You should have a follow up appointment with Dr. Kranthi Mcconnell to review the results of the liver biopsy results in 2 weeks. If you do not have an appointment please call the office at the number listed above to schedule this. Other instructions: If you have any problems or questions call the [facility]i Baez 32 office at the phone number listed above. DISCHARGE SUMMARY from Nurse: The following personal items collected during your admission are returned to you:  
Dental Appliance: Dental Appliances: None Vision: Visual Aid: Glasses (reading) Hearing Aid:   
Jewelry:   
Clothing:   
Other Valuables:   
Valuables sent to safe:   
 
 
DISCHARGE SUMMARY from Nurse The following personal items collected during your admission are returned to you:  
Dental Appliance: Dental Appliances: None Vision: Visual Aid: Glasses (reading) Hearing Aid:   
Jewelry:   
Clothing:   
Other Valuables:   
Valuables sent to safe:   
 
 
 
 
 
PATIENT INSTRUCTIONS: 
 
After general anesthesia or intravenous sedation, for 24 hours or while taking prescription Narcotics: · Limit your activities · Do not drive and operate hazardous machinery · Do not make important personal or business decisions · Do  not drink alcoholic beverages · If you have not urinated within 8 hours after discharge, please contact your surgeon on call. Report the following to your surgeon: 
· Excessive pain, swelling, redness or odor of or around the surgical area · Temperature over 100.5 · Nausea and vomiting lasting longer than 4 hours or if unable to take medications · Any signs of decreased circulation or nerve impairment to extremity: change in color, persistent  numbness, tingling, coldness or increase pain · Any questions Follow-up Appointments Procedures  FOLLOW UP VISIT Appointment in: One Week Standing Status:   Standing Number of Occurrences:   1 Order Specific Question:   Appointment in Answer: One Week What to do at Home: 
Recommended activity: as above, If you experience any of the following symptoms as above, please follow up with Dr. Mickeal Aase. *  Please give a list of your current medications to your Primary Care Provider.  
 
*  Please update this list whenever your medications are discontinued, doses are 
    changed, or new medications (including over-the-counter products) are added. *  Please carry medication information at all times in case of emergency situations. These are general instructions for a healthy lifestyle: No smoking/ No tobacco products/ Avoid exposure to second hand smoke Surgeon General's Warning:  Quitting smoking now greatly reduces serious risk to your health. Obesity, smoking, and sedentary lifestyle greatly increases your risk for illness A healthy diet, regular physical exercise & weight monitoring are important for maintaining a healthy lifestyle You may be retaining fluid if you have a history of heart failure or if you experience any of the following symptoms:  Weight gain of 3 pounds or more overnight or 5 pounds in a week, increased swelling in our hands or feet or shortness of breath while lying flat in bed. Please call your doctor as soon as you notice any of these symptoms; do not wait until your next office visit. Recognize signs and symptoms of STROKE: 
 
F-face looks uneven A-arms unable to move or move unevenly S-speech slurred or non-existent T-time-call 911 as soon as signs and symptoms begin-DO NOT go Back to bed or wait to see if you get better-TIME IS BRAIN. The discharge information has been reviewed with the . The patient and caregiver verbalized understanding. Warning Signs of HEART ATTACK Call 911 if you have these symptoms: 
? Chest discomfort. Most heart attacks involve discomfort in the center of the chest that lasts more than a few minutes, or that goes away and comes back. It can feel like uncomfortable pressure, squeezing, fullness, or pain. ? Discomfort in other areas of the upper body. Symptoms can include pain or discomfort in one or both arms, the back, neck, jaw, or stomach. ? Shortness of breath with or without chest discomfort. ? Other signs may include breaking out in a cold sweat, nausea, or lightheadedness. Don't wait more than five minutes to call [address]! Fast action can save your life. Calling 911 is almost always the fastest way to get lifesaving treatment. Emergency Medical Services staff can begin treatment when they arrive  up to an hour sooner than if someone gets to the hospital by car. The discharge information has been reviewed with the patient and caregiver. The patient and caregiver verbalized understanding. Discharge medications reviewed with the patient and caregiver and appropriate educational materials and side effects teaching were provided. Patient armband removed and shredded Introducing [facility]! [facility] introduces oLyfe patient portal. Now you can access parts of your medical record, email your doctor's office, and request medication refills online. 1. In your internet browser, go to [email or web address]/[email or web address] Ignitiont 2. Click on the First Time User? Click Here link in the Sign In box. You will see the New Member Sign Up page. 3. Enter your oLyfe Access Code exactly as it appears below. You will not need to use this code after youve completed the sign-up process. If you do not sign up before the expiration date, you must request a new code. · oLyfe Access Code: [email or web address] Expires: 9/20/2018  3:53 PM 
 
4. Enter the last four digits of your Social Security Number (xxxx) and Date of Birth (mm/dd/yyyy) as indicated and click Submit. You will be taken to the next sign-up page. 5. Create a Nualightt ID. This will be your oLyfe login ID and cannot be changed, so think of one that is secure and easy to remember. 6. Create a Nualightt password. You can change your password at any time. 7. Enter your Password Reset Question and Answer. This can be used at a later time if you forget your password. 8. Enter your e-mail address. You will receive e-mail notification when new information is available in [address] 9. Click Sign Up. You can now view and download portions of your medical record. 10. Click the Download Summary menu link to download a portable copy of your medical information. If you have questions, please visit the Frequently Asked Questions section of the [employer] website. Remember, [employer] is NOT to be used for urgent needs. For medical emergencies, dial 911. Now available from your iPhone and Android! Introducing Tl Medrano As a [email or web address] patient, I wanted to make you aware of our electronic visit tool called Tl Medrano. [employer] allows you to connect within minutes with a medical provider 24 hours a day, seven days a week via a mobile device or tablet or logging into a secure website from your computer. You can access Tl Medrano from anywhere in the [place]. A virtual visit might be right for you when you have a simple condition and feel like you just dont want to get out of bed, or cant get away from work for an appointment, when your regular [facility] [employer] [facility] provider is not available (evenings, weekends or holidays), or when youre out of town and need minor care. Electronic visits cost only $49 and if the [employer] provider determines a prescription is needed to treat your condition, one can be electronically transmitted to a nearby pharmacy*. Please take a moment to enroll today if you have not already done so. The enrollment process is free and takes just a few minutes. To enroll, please download the [employer] sweetie to your tablet or phone, or visit [email or web address] to enroll on your computer.    
And, as an [address] [place] [address] patient with a [employer] account, the results of your visits will be scanned into your electronic medical record and your primary care provider will be able to view the scanned results. We urge you to continue to see your regular [facility] provider for your ongoing medical care. And while your primary care provider may not be the one available when you seek a [facility] virtual visit, the peace of mind you get from getting a real diagnosis real time can be priceless. For more information on [employer], view our Frequently Asked Questions (FAQs) at [email or web address]. Sincerely, 
 
Boris Foster MD 
Chief Medical Officer [employer] *:  certain medications cannot be prescribed via [employer] Unresulted tests-please follow up with your PCP on these results Procedure/Test Authorizing Provider PATHOLOGY SPECIMEN TO LAB Governor Lexii MD  
  
Providers Seen During Your Hospitalization Provider Specialty Primary office phone Governor Lexii MD Hepatology [phone] Your Primary Care Physician (PCP) Primary Care Physician Office Phone Office Fax Deonte Flower [phone] You are allergic to the following Allergen Reactions Tape (Adhesive) Other (comments) Pulls skin off Percocet (Oxycodone-Acetaminophen) Shortness of Breath Itching Other (comments) \"Burning skin\" Statins-Hmg-Coa Reductase Inhibitors Other (comments) liver Recent Documentation Height Weight BMI OB Status Smoking Status 1.676 m 91.9 kg 32.72 kg/m2 Postmenopausal Never Smoker Emergency Contacts Name Discharge Info Relation Home Work Mobile Elieser Dennis DISCHARGE CAREGIVER [3] Friend [5] [phone] Isis Breaker CAREGIVER [3] Daughter [21] [phone]    
 Rhina TITUS DISCHARGE CAREGIVER [3] Child [2] [phone] Patient Belongings The following personal items are in your possession at time of discharge: 
  Dental Appliances: None  Visual Aid: Glasses (reading) Please provide this summary of care documentation to your next provider. Signatures-by signing, you are acknowledging that this After Visit Summary has been reviewed with you and you have received a copy. Patient Signature:  ____________________________________________________________ Date:  ____________________________________________________________  
  
Sigmund [place] Provider Signature:  ____________________________________________________________ Date:  ____________________________________________________________

## 2018-06-25 NOTE — H&P
70 Calvin De Souza MD, FACP, [place], [place]       Deni Postal, NP    ZAKI Arriaza, ACNP-BC   Shannon Garrison, SILVA Giles, SILVA Pryor [facility] 136    at [address]    [address] [place] [address], [address] [facility], Kimberley  22. [phone]    FAX: [address] [place] [address] Avenue    at [facility], [address] [place] [address], [address]    [phone]    FAX: [phone]       PRE-PROCEDURE NOTE - LIVER BIOPSY    H and P from last office visit reviewed. Allergies reviewed. Out-patient medication list reviewed. Patient Active Problem List   Diagnosis Code    S/P liver transplant ([facility] Z94.4    Diabetes mellitus ([facility] E11.9    Colon polyps K63.5    Breast cancer ([facility] C50.919    H/O shoulder surgery Z98.890    Back pain, lumbosacral M54.5    H/O liver transplant ([facility] D33.2    Complication of transplanted liver ([facility] T86.40    Long term current use of immunosuppressive drug Z79.899       Allergies   Allergen Reactions    Tape [Adhesive] Other (comments)     Pulls skin off    Percocet [Oxycodone-Acetaminophen] Shortness of Breath, Itching and Other (comments)     \"Burning skin\"    Statins-Hmg-Coa Reductase Inhibitors Other (comments)     liver       No current facility-administered medications on file prior to encounter. Current Outpatient Prescriptions on File Prior to Encounter   Medication Sig Dispense Refill    predniSONE (DELTASONE) 5 mg tablet Take 1 Tab by mouth daily. 90 Tab 3    sirolimus (RAPAMUNE) 1 mg tablet Take 3 mg by mouth daily.  mycophenolate (CELLCEPT) 500 mg tablet Take 2 Tabs by mouth two (2) times a day. 120 Tab 11    glimepiride (AMARYL) 4 mg tablet Take 4 mg by mouth daily.       LISDEXAMFETAMINE DIMESYLATE (VYVANSE PO) Take  by mouth.  furosemide (LASIX) 40 mg tablet TAKE ONE TABLET BY MOUTH EVERY DAY 90 Tab 3    insulin NPH (HUMULIN N) 100 unit/mL (3 mL) inpn by SubCUTAneous route. Indications: PRN      amoxicillin (AMOXIL) 500 mg capsule Take 4 capsules by mouth 1 hour prior to dental procedure 4 Cap 0    cyproheptadine (PERIACTIN) 4 mg tablet Take 1 Tab by mouth three (3) times daily as needed. Indications: Pruritus of Skin 90 Tab 0    alendronate (FOSAMAX) 70 mg tablet Take 1 Tab by mouth every seven (7) days. 12 Tab 3       For liver biopsy to assess for Persitant elevation in liver enzymes. Previous graft rejection in 11/2018. Original liver transplant in 2013. The risks of the procedure were discussed with the patient. This included bleeding, pain, and puncture of other organs. All questions were answered. The patient wishes to proceed with the procedure. PHYSICAL EXAMINATION:  VS: per nursing note  General: No acute distress. Eyes: Sclera anicteric. ENT: No oral lesions. Thyroid normal.  Nodes: No adenopathy. Skin: No spider angiomata. No jaundice. No palmar erythema. Respiratory: Lungs clear to auscultation. Cardiovascular: Regular heart rate. No murmurs. No JVD. Abdomen: Soft non-tender, liver size normal to percussion/palpation. Spleen not palpable. No obvious ascites. Extremities: No edema. No muscle wasting. No gross arthritic changes. Neurologic: Alert and oriented. Cranial nerves grossly intact. No asterixis.       LABS:  Lab Results   Component Value Date/Time    WBC 4.6 06/14/2018 09:56 AM    Hemoglobin, POC 11.2 (L) 07/19/2013 11:30 AM    HGB 12.9 06/14/2018 09:56 AM    Hematocrit, POC 33 (L) 07/19/2013 11:30 AM    HCT 39.2 06/14/2018 09:56 AM    PLATELET 330 39/85/7880 09:56 AM    MCV 82 06/14/2018 09:56 AM     Lab Results   Component Value Date/Time    INR 0.90 06/20/2018 11:13 AM    INR 0.8 11/06/2017 12:58 PM    INR 0.90 11/02/2017 11:37 AM    Prothrombin time 9.4 06/20/2018 11:13 AM    Prothrombin time 10.6 (L) 11/06/2017 12:58 PM    Prothrombin time 9.4 11/02/2017 11:37 AM       ASSESSMENT AND PLAN:  Liver biopsy under ultrasound guidance.     MD Altagracia Hager 13 [facility] [employer] [address], [address] [place] [address] - [address]  [phone]

## 2018-06-28 ENCOUNTER — PATIENT OUTREACH (OUTPATIENT)
Dept: HEMATOLOGY | Age: 63
End: 2018-06-28

## 2018-06-28 NOTE — PROGRESS NOTES
Discussed liver biopsy pathology results with Dr. Kacey Watson. Phone call placed to patient. Received VM. Left message notifying patient of biopsy results. Advised patient to diet and exercise to achieve weight loss. Instructed patient to begin Prednisone wean; begin taking medication every other day now and continue for 2 weeks, then stop. Reminded of f/u appointment on 7/2 with Dr. Kacey Watson.

## 2018-07-02 ENCOUNTER — HOSPITAL ENCOUNTER (OUTPATIENT)
Dept: LAB | Age: 63
Discharge: HOME | End: 2018-07-02
Payer: MEDICARE

## 2018-07-02 ENCOUNTER — OFFICE VISIT (OUTPATIENT)
Dept: HEMATOLOGY | Age: 63
End: 2018-07-02

## 2018-07-02 VITALS
HEIGHT: 66 IN | OXYGEN SATURATION: 98 % | BODY MASS INDEX: 32.47 KG/M2 | WEIGHT: 202 LBS | DIASTOLIC BLOOD PRESSURE: 61 MMHG | SYSTOLIC BLOOD PRESSURE: 139 MMHG | TEMPERATURE: 99.8 F | HEART RATE: 88 BPM

## 2018-07-02 DIAGNOSIS — R63.5 WEIGHT GAIN: Primary | ICD-10-CM

## 2018-07-02 DIAGNOSIS — R63.5 WEIGHT GAIN: ICD-10-CM

## 2018-07-02 LAB
T3FREE SERPL-MCNC: 3.4 PG/ML (ref 2.18–3.98)
T4 FREE SERPL-MCNC: 1.3 NG/DL (ref 0.7–1.5)
TSH SERPL DL<=0.05 MIU/L-ACNC: 0.38 UIU/ML (ref 0.36–3.74)

## 2018-07-02 PROCEDURE — 84439 ASSAY OF FREE THYROXINE: CPT | Performed by: INTERNAL MEDICINE

## 2018-07-02 PROCEDURE — 84443 ASSAY THYROID STIM HORMONE: CPT | Performed by: INTERNAL MEDICINE

## 2018-07-02 PROCEDURE — 84481 FREE ASSAY (FT-3): CPT | Performed by: INTERNAL MEDICINE

## 2018-07-02 PROCEDURE — 36415 COLL VENOUS BLD VENIPUNCTURE: CPT | Performed by: INTERNAL MEDICINE

## 2018-07-02 NOTE — PROGRESS NOTES
[address] MD, Greta Richards, Wil [place], [place]       Ayana Hillman, ZAKI Duvall, [facility]   SILVA East NP        at [address], [address] [address]  [address].     [phone]     FAX: [phone]    at [address], [address] [place] [address]: [phone]       Patient Care Team:  Clem Alejandro MD as PCP - General (Family Practice)  Neeraj Noland MD as Physician (Surgery)  Amarjit Clay MD as Physician (Plastic Surgery)  Fidelia Dersa MD as Physician (Obstetrics & Gynecology)  Marcio Almeida MD as Physician (Radiation Oncology)  Bismark Feng MD as Physician (Neurosurgery)  Essence Gibson DDS as Physician ([address])  Fidelia Deras MD as Physician (Ophthalmology)  Lynsey Gregg O.D. as Physician (Optometry)  Iván Juárez MD as Physician (Orthopedic Surgery)  Elsa Frye RN as Nurse Waldemar Hill MD as Physician (Internal Medicine)  Celina Cummings MD (Gastroenterology)  Brandi Roblero MD (Internal Medicine)  Surekha Luu MD (Hematology and Oncology)        Problem List  Date Reviewed: 4/18/2018          Codes Class Noted    Long term current use of immunosuppressive drug ICD-10-CM: Z79.899  ICD-9-CM: V58.69  4/18/2018        H/O liver transplant [facility]) ICD-10-CM: Z94.4  ICD-9-CM: V42.7  30/94/3708        Complication of transplanted liver [facility]) ICD-10-CM: T86.40  ICD-9-CM: 996.82  11/13/2013        Back pain, lumbosacral ICD-10-CM: M54.5  ICD-9-CM: 724.2, 724.6  1/27/2013        S/P liver [facility] ([facility] ICD-10-CM: Z94.4  ICD-9-CM: V42.7  5/28/2012    Overview Addendum 6/25/2013  9:48 AM by Ailin Woodard MD     4/2013             Diabetes mellitus ([facility] ICD-10-CM: E11.9  ICD-9-CM: 250.00  5/28/2012 Colon polyps ICD-10-CM: K63.5  ICD-9-CM: 211.3  5/28/2012        Breast cancer ([facility] ICD-10-CM: C50.919  ICD-9-CM: 174.9  5/28/2012    Overview Signed 5/28/2012  7:00 AM by Ge Painting MD     Masectomy, chemotherapy,XRT. 1996  Tamoxifen [id number]               H/O shoulder surgery ICD-10-CM: Z98.890  ICD-9-CM: V45.89  5/28/2012    Overview Signed 5/28/2012  7:07 AM by Ge Painting MD     12/2011                   Carlita First returns to the [address] for management of liver allograft function, to adjust immune suppression. The active problem list, all pertinent past medical history, medications, liver histology, endoscopic studies, radiologic findings and laboratory findings related to the liver disorder were reviewed with the patient. The patient underwent liver transplantation at [address] in 4/2013. The patient is currently receiving sirolimus cellcept and prednsione for immune suppression. The patient had an acute graft rejection in 10/2017. She has a repeat liver biopsy because of persistent elevation in liver enzymes in 6/2018. This demonstrated steatosis. She devloped RUQ pain after the biopsy and was found to have a large intrahepatic hematoma on CT scan. The patient has developed a severe cellulitis of the right hand which required 2 surgeries in 1/2018 and 2/2018. The hand and forearm continue to improve. The patient has no symptoms which can be attributed to the liver disorder. The patient has not experienced fatigue, fevers, chills,     The patient completes all daily activities without any functional limitations. All of the issues listed in the Assessment and Plan were discussed with the patient. All questions were answered. The patient expressed a clear understanding of the above. [facility] 87 in 4 weeks for routine monitoring.       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 Liver transaminases are elevated. ALP is elevated. Liver function is normal.  The platelet count is normal.      A liver biopsy from 11/2017 demonstrated acute rejection. Rejection was treated with high dose steroids and taper. She had persistent elevation in liver enzymes  A repeat liver biopsy in 6/2018 demonstrated fatty liver with no rejection. Will check thyroid function tests. Immune Suppression  The patient is receiving immune suppression with sirolimus which is being well tolerated with only mild side effects. The immune suppression blood level is in the proper range. Will continue primary immune suppression at the current dose. Cellcept is being tolerated well   Will continue at current dose     Prednisone will be maintained a tthe low dose of 5 mg every day because of the previous rejection. NAFL  The most recent liver biopsy demonstrates NAFL. The patient was counseled regarding diet and exercise to achieve weight loss. The best diet for patients with fatty liver is one very low in carbohydrates and enriched with protein such as an Martha's program.    The patient was told to consume any food products and drinks containing fructose as this enhances hepatic fat synthesis. Acute and chronic kidney injury   This is a common adverse event of immune suppression. The Screat is normal.  Aspirin and NSAIDs should be avoided since these agents can worsen renal insufficiency. Hypercholesterolemia   This can be caused by immune suppression. Serum cholesterol is normal and does not need to be treated at this time. Hypertension   This is a common side effect of immune suppression. Blood pressure is well controlled on the current treatment. Low serum magnesium   This is a common side effect of immune suppression. The patient has a normal or near normal magnesium level and does not need supplementation.     Osteoporosis Osteoporosis is commin in patients with cirhrosis prior to liver transplant. The patient had osteoporosis on DEXA scan from 2013. The patient had osteoporosis on DEXA scan from 2013. The patient was advised to take calcium supplementation and Vitamin D. The patient should be treated with a bisphosphonate if the bone density does not improve. Monitoring for skin Cancer  The patient was counseled regarding increased risk of skin cancer in transplant recipients and need to have any new skin lesions evaluated by dermatology and removed if suspicious. The patient was instructed to see Dermatology annually examination. Vaccinations  Routine vaccinations against other bacterial and viral agents can be performed as long as this is with attentuatted virus. Live virus vaccines should not be administered. Annual flu vaccination should be administered. ALLERGIES:  Allergies   Allergen Reactions    Tape [Adhesive] Other (comments)     Pulls skin off    Percocet [Oxycodone-Acetaminophen] Shortness of Breath, Itching and Other (comments)     \"Burning skin\"    Statins-Hmg-Coa Reductase Inhibitors Other (comments)     liver     MEDICATIONS:  Current Outpatient Prescriptions   Medication Sig    insulin NPH/insulin regular (NOVOLIN 70/30, HUMULIN 70/30) 100 unit/mL (70-30) injection by SubCUTAneous route.  metoprolol tartrate (LOPRESSOR) 50 mg tablet Take 50 mg by mouth two (2) times a day.  ondansetron hcl (ZOFRAN) 4 mg tablet Take 4 mg by mouth every eight (8) hours as needed for Nausea.  insulin NPH (HUMULIN N) 100 unit/mL (3 mL) inpn by SubCUTAneous route. Indications: PRN    amoxicillin (AMOXIL) 500 mg capsule Take 4 capsules by mouth 1 hour prior to dental procedure    predniSONE (DELTASONE) 5 mg tablet Take 1 Tab by mouth daily.  cyproheptadine (PERIACTIN) 4 mg tablet Take 1 Tab by mouth three (3) times daily as needed.  Indications: Pruritus of Skin    sirolimus (RAPAMUNE) 1 mg tablet Take 3 mg by mouth daily.  mycophenolate (CELLCEPT) 500 mg tablet Take 2 Tabs by mouth two (2) times a day.  glimepiride (AMARYL) 4 mg tablet Take 4 mg by mouth daily.  alendronate (FOSAMAX) 70 mg tablet Take 1 Tab by mouth every seven (7) days.  LISDEXAMFETAMINE DIMESYLATE (VYVANSE PO) Take  by mouth.  furosemide (LASIX) 40 mg tablet TAKE ONE TABLET BY MOUTH EVERY DAY     No current facility-administered medications for this visit. SYSTEM REVIEW NOT RELATED TO LIVER DISEASE OR REVIEWED ABOVE:  Constitution systems: Weight gain with steroids. Eyes: Negative for visual changes. ENT: Negative for sore throat, painful swallowing. Respiratory: Negative for cough, hemoptysis, SOB. Cardiology: Negative for chest pain, palpitations. GI:  Negative for constipation or diarrhea. : Negative for urinary frequency, dysuria, hematuria, nocturia. Skin: Negative for rash. Hematology: Negative for easy bruising, blood clots. Musculo-skelatal: Severe cellulitis of right hand requried surgery and is now casted. The back pain is improved with the high dose steroids. Neurologic: Negative for headaches, dizziness, vertigo, memory problems not related to HE. Psychology: Negative for anxiety, depression. FAMILY HISTORY:  There is no family history of liver disease. SOCIAL HISTORY:  The patient is . There are 2 children and 2 grandchildren. The patient has never used tobacco products. The patient has never consumed significant amounts of alcohol. The patient used to work for "[employer] and then as an  for [employer].  She has not worked since the liver transplant. PHYSICAL EXAMINATION:    Visit Vitals    /61    Pulse 88    Temp 99.8 °F (37.7 °C) (Tympanic)    Ht 5' 6\" (1.676 m)    Wt 202 lb (91.6 kg)    SpO2 98%    BMI 32.6 kg/m2       General: Appears healthy. No acute distress. Eyes: Sclera anicteric. ENT: No oral lesions. Thyroid normal.  Nodes: No adenopathy. Skin: No spider angiomata. No jaundice. No palmar erythema. Respiratory: Lungs clear to auscultation. Cardiovascular: Regular heart rate. No murmurs. No JVD. Abdomen:   Well healed liver transplant incision. Soft non-tender. Liver size normal to percussion/palpation. Spleen not palpable. No obvious ascites. Extremities: No lower edema. No muscle wasting. No gross arthritic changes. Neurologic:  Alert and oriented. Cranial nerves grossly intact. No asterixsis. LAB STUDIES:  Liver [place] of [address] & Units 3/2/2018 2/12/2018   WBC 4.0 - 11.0 K/uL 4.9 5.2   ANC 1.8 - 8.0 K/UL     HGB 11.7 - 16.0 g/dL 10.1 (L) 10.8 (L)    - 440 K/uL 228 195   INR 0.8 - 1.2       AST 10 - 37 U/L 66 (H) 35   ALT 5 - 40 U/L 10 8   Alk Phos 40 - 120 U/L 229 (H) 188 (H)   Bili, Total 0.2 - 1.2 mg/dL 0.1 (L) 0.2   Bili, Direct 0.0 - 0.3 mg/dL <0.2 <0.2   Albumin 3.5 - 5.0 g/dL 4.2 3.7   BUN 6 - 22 mg/dL 23 (H) 19   Creat 0.8 - 1.4 mg/dL 1.6 (H) 1.4   Na 133 - 145 mmol/L 143 140   K 3.5 - 5.5 mmol/L 4.3 3.7   Cl 98 - 110 mmol/L 103 100   CO2 20 - 32 mmol/L 23 21   Glucose 70 - 99 mg/dL 146 (H) 134 (H)   Magnesium 1.6 - 2.5 mg/dL         Liver Virology and Transplant Immune Suppression Latest Ref Rng & Units 11/22/2017   Sirolimus Level 3.0 - 20.0 ng/mL 5.8     Liver Virology and Transplant Immune Suppression Latest Ref Rng & Units 11/13/2017   Sirolimus Level 3.0 - 20.0 ng/mL 4.4     SEROLOGIES:  2/2012. HAV total negative, HBsAntigen negative, anti-HBcore negative, anti-HBsurface negative, anti-HCV negative, Ferritin 20, NEVA negative, ASMA negative, AMA negative, ceruloplsmin 34, alpha-1-antitrypsin 195, A1AT phenotype MM.  2/2013. Ferritin 434, ASMA weak positive, CMV IgG positive, EBV IgG positive, anti-HIV negative, HBA1C 5.1    LIVER HISTOLOGY:  11/2017. Slides reviewed by MLS. Acute graft rejection.       ENDOSCOPIC PROCEDURES:  1/2012. EGD by Dr Aurora Mcallister. Esophageal varices. RADIOLOGY:  1/2012. CT scan abdomen with and without IV contrast.  Changes consistent with cirrhosis. No liver mass lesions. No dilated bile ducts. No bile duct strictures. Varices. Generalized ascites. 07/2012:  Ultrasound of the liver. Echogenic consistent with chronic liver disease, cirrhosis. No liver mass lesions. No ascites  11/2012: Ultrasound of the liver. Echogenic consistent with chronic liver disease, cirrhosis. No liver mass lesions. Small amount ascites present. 1/2013. Ultrasound of liver. Echogenic consistent with cirrhosis. No liver mass lesions. No dilated bile ducts. Mild ascites. 11/2013. Ultrasound of liver. Normal appearing liver. No liver mass lesions. 12/2013:  MRI of abdomen. Questionable small focal stenosis of distal common hepatic duct. Focal stenosis in mid-portal vein. OTHER TESTING:.   2/2013. ETOH negative.   08/2013:  DEXA scan - osteoporosis         MD Altagracia Dejesus [address] of [address] of [place]  4 [facility], [address] [place] [address] Lisa Patten, [address]  [phone]

## 2018-07-02 NOTE — MR AVS SNAPSHOT
[address] [place] [address] 
[phone] Patient: Shane Lamp MRN: [id number] Visit Information Date & Time Provider Department Dept. Phone Encounter #  
 2018  1:15 PM [facility] of  [address] [id number] Your Appointments 2018 12:30 PM  
Follow Up with Watson Duque MD  
[id number] [facility] ([facility])  
 [address] [place] [address] Salvador Treviño [place]   
  
   
 [facility], [address] [place] [address] Upcoming Health Maintenance Date Due Hepatitis C Screening 1955 FOOT EXAM Q1 1963 MICROALBUMIN Q1 1963 EYE EXAM RETINAL OR DILATED Q1 1963 DTaP/Tdap/Td series (1 - Tdap) 1974 FOBT Q 1 YEAR AGE 50-75 2003 Pneumococcal 19-64 Highest Risk (2 of 3 - PCV13) 2013 ZOSTER VACCINE AGE 60> 2013 PAP AKA CERVICAL CYTOLOGY 10/18/2014 HEMOGLOBIN A1C Q6M 12/15/2016 LIPID PANEL Q1 2018 MEDICARE YEARLY EXAM 3/14/2018 Bone Densitometry (Dexa) Screening 2018 Influenza Age 5 to Adult 2018 BREAST CANCER SCRN MAMMOGRAM 2018 Allergies as of 2018  Review Complete On: 2018 By: Angelica Rosa LPN Severity Noted Reaction Type Reactions Tape [Adhesive] High 2017    Other (comments) Pulls skin off Percocet [Oxycodone-acetaminophen]  2013    Shortness of Breath, Itching, Other (comments) \"Burning skin\" Statins-hmg-coa Reductase Inhibitors  2018    Other (comments) liver Current Immunizations  Reviewed on 2013 Name Date Influenza Vaccine 2013 Pneumococcal Vaccine (Unspecified Type) 2012 Not reviewed this visit You Were Diagnosed With   
  
 Codes Comments Weight gain    -  Primary ICD-10-CM: R63.5 ICD-9-CM: 783.1 Vitals BP Pulse Temp Height(growth percentile) Weight(growth percentile) SpO2  
 139/61 88 99.8 °F (37.7 °C) (Tympanic) 5' 6\" (1.676 m) 202 lb (91.6 kg) 98% BMI OB Status Smoking Status 32.6 kg/m2 Postmenopausal Never Smoker BMI and BSA Data Body Mass Index Body Surface Area  
 32.6 kg/m 2 2.07 m 2 Your Updated Medication List  
  
   
This list is accurate as of 7/2/18  2:45 PM.  Always use your most recent med list.  
  
  
  
  
 alendronate 70 mg tablet Commonly known as:  FOSAMAX Take 1 Tab by mouth every seven (7) days. amoxicillin 500 mg capsule Commonly known as:  AMOXIL Take 4 capsules by mouth 1 hour prior to dental procedure  
  
 cyproheptadine 4 mg tablet Commonly known as:  PERIACTIN Take 1 Tab by mouth three (3) times daily as needed. Indications: Pruritus of Skin  
  
 furosemide 40 mg tablet Commonly known as:  LASIX TAKE ONE TABLET BY MOUTH EVERY DAY  
  
 glimepiride 4 mg tablet Commonly known as:  AMARYL Take 4 mg by mouth daily. insulin  unit/mL (3 mL) Inpn Commonly known as:  HUMULIN N  
by SubCUTAneous route. Indications: PRN  
  
 insulin NPH/insulin regular 100 unit/mL (70-30) injection Commonly known as:  NOVOLIN 70/30, HUMULIN 70/30  
by SubCUTAneous route. LOPRESSOR 50 mg tablet Generic drug:  metoprolol tartrate Take 50 mg by mouth two (2) times a day. mycophenolate 500 mg tablet Commonly known as:  CELLCEPT Take 2 Tabs by mouth two (2) times a day. predniSONE 5 mg tablet Commonly known as:  Johann Dally Take 1 Tab by mouth daily. sirolimus 1 mg tablet Commonly known as:  RAPAMUNE Take 3 mg by mouth daily. VYVANSE PO Take  by mouth. ZOFRAN 4 mg tablet Generic drug:  ondansetron hcl Take 4 mg by mouth every eight (8) hours as needed for Nausea. To-Do List   
 07/02/2018 Lab:  T3, FREE   
  
 07/02/2018 Lab:  T4, FREE   
  
 07/02/2018 Lab:  TSH 3RD GENERATION Introducing [facility] SERVICES! [employer] introduces [employer] patient portal. Now you can access parts of your medical record, email your doctor's office, and request medication refills online. 1. In your internet browser, go to [email or web address] 2. Click on the First Time User? Click Here link in the Sign In box. You will see the New Member Sign Up page. 3. Enter your [employer] Access Code exactly as it appears below. You will not need to use this code after youve completed the sign-up process. If you do not sign up before the expiration date, you must request a new code. · [employer] Access Code: [email or web address] Expires: 9/20/2018  3:53 PM 
 
4. Enter the last four digits of your Social Security Number (xxxx) and Date of Birth (mm/dd/yyyy) as indicated and click Submit. You will be taken to the next sign-up page. 5. Create a [employer] ID. This will be your [employer] login ID and cannot be changed, so think of one that is secure and easy to remember. 6. Create a [employer] password. You can change your password at any time. 7. Enter your Password Reset Question and Answer. This can be used at a later time if you forget your password. 8. Enter your e-mail address. You will receive e-mail notification when new information is available in [address] 9. Click Sign Up. You can now view and download portions of your medical record. 10. Click the Download Summary menu link to download a portable copy of your medical information. If you have questions, please visit the Frequently Asked Questions section of the [employer] website. Remember, [employer] is NOT to be used for urgent needs. For medical emergencies, dial 911. Now available from your iPhone and Android! Please provide this summary of care documentation to your next provider. Your primary care clinician is listed as Bita Fan. If you have any questions after today's visit, please call [phone].

## 2018-08-10 ENCOUNTER — PATIENT OUTREACH (OUTPATIENT)
Dept: HEMATOLOGY | Age: 63
End: 2018-08-10

## 2018-08-10 NOTE — PROGRESS NOTES
Received phone call from patient regarding labs and upcoming appointment. Discussed recent ED visit and abdominal imaging. Patient inquired if she needed to repeat labs prior to f/u appointment with Dr. Angelica Cowan on 8/20. Return phone call placed to patient. Notified she does not need to repeat labs prior to appointment. Patient informed imaging would be repeated and Dr. Angelica Cowan would discuss timing at the appointment. Patient verbalized understanding.

## 2018-08-20 ENCOUNTER — OFFICE VISIT (OUTPATIENT)
Dept: HEMATOLOGY | Age: 63
End: 2018-08-20

## 2018-08-20 VITALS
SYSTOLIC BLOOD PRESSURE: 141 MMHG | TEMPERATURE: 99.5 F | DIASTOLIC BLOOD PRESSURE: 68 MMHG | HEIGHT: 66 IN | BODY MASS INDEX: 31.5 KG/M2 | RESPIRATION RATE: 20 BRPM | HEART RATE: 79 BPM | OXYGEN SATURATION: 98 % | WEIGHT: 196 LBS

## 2018-08-20 DIAGNOSIS — Z79.899 LONG TERM CURRENT USE OF IMMUNOSUPPRESSIVE DRUG: ICD-10-CM

## 2018-08-20 DIAGNOSIS — T86.41 LIVER TRANSPLANT REJECTION (HCC): Primary | ICD-10-CM

## 2018-08-20 DIAGNOSIS — Z94.4 H/O LIVER TRANSPLANT (HCC): ICD-10-CM

## 2018-08-20 NOTE — PROGRESS NOTES
[address] Ochoa MD, [address], [facility]       SILVA Cowart, ZAKI Zaragoza, [facility]   SILVA Palma NP        at [address], [address] [address]     [phone]     FAX: [phone]    at [address], [address], [address] [place] [address] Street: [phone]       Patient Care Team:  Pooja Mark MD as PCP - General (Family Practice)  Mela Dexter MD as Physician (Surgery)  Margarito Chaudhari MD as Physician (Plastic Surgery)  Fern Naidu MD as Physician (Obstetrics & Gynecology)  Oliva Whitlock MD as Physician (Radiation Oncology)  Marisa Alves MD as Physician (Neurosurgery)  Sonja Crespo DDS as Physician ([address])  Fern Naidu MD as Physician (Ophthalmology)  Lissa West O.D. as Physician (Optometry)  Misbah Otero MD as Physician (Orthopedic Surgery)  Kinza Vasquez RN as Nurse Kerrie Kaur MD as Physician (Internal Medicine)  Andrews Garcia MD (Gastroenterology)  Alan Gamino MD (Internal Medicine)  Cristel Quarles MD (Hematology and Oncology)        Problem List  Date Reviewed: 8/26/2018          Codes Class Noted    Long term current use of immunosuppressive drug ICD-10-CM: Z79.899  ICD-9-CM: V58.69  4/18/2018        H/O liver transplant [facility]) ICD-10-CM: Z94.4  ICD-9-CM: V42.7  25/09/2956        Complication of transplanted liver [facility]) ICD-10-CM: T86.40  ICD-9-CM: 996.82  11/13/2013        Back pain, lumbosacral ICD-10-CM: M54.5  ICD-9-CM: 724.2, 724.6  1/27/2013        S/P liver transplant ([facility] [address] ICD-10-CM: Z94.4  ICD-9-CM: V42.7  5/28/2012    Overview Addendum 6/25/2013  9:48 AM by Pradeep Hensley MD     4/2013             Di[facility] ICD-10-CM: E11.9  ICD-9-CM: 250.00  5/28/2012 Colon polyps ICD-10-CM: K63.5  ICD-9-CM: 211.3  5/28/2012        Breast cancer ([facility] [address] ICD-10-CM: C50.919  ICD-9-CM: 174.9  5/28/2012    Overview Signed 5/28/2012  7:00 AM by Jesse Armendariz MD     Masectomy, chemotherapy,XRT. 1996  Tamoxifen [id number]               H/O shoulder surgery ICD-10-CM: Z98.890  ICD-9-CM: V45.89  5/28/2012    Overview Signed 5/28/2012  7:07 AM by Jesse Armendariz MD     12/2011                   Dashawn Warner returns to the [address] for management of liver allograft function, to adjust immune suppression. The active problem list, all pertinent past medical history, medications, liver histology, endoscopic studies, radiologic findings and laboratory findings related to the liver disorder were reviewed with the patient. The patient underwent liver transplantation at [place], [place] in 4/2013. The patient is currently receiving sirolimus cellcept and prednsione for immune suppression. The patient had an acute graft rejection in 10/2017. She has a repeat liver biopsy because of persistent elevation in liver enzymes in 6/2018. This demonstrated steatosis. She had a post-LBX bleed into the liver. This is resolving and the liver enzymes are now down to normal.    The patient has developed a severe cellulitis of the right hand which required 2 surgeries in 1/2018 and 2/2018. The hand and forearm continue to improve. The patient has no symptoms which can be attributed to the liver disorder. The patient has not experienced fatigue, fevers, chills,     The patient completes all daily activities without any functional limitations. All of the issues listed in the Assessment and Plan were discussed with the patient. All questions were answered. The patient expressed a clear understanding of the above. [facility] 87 in 4 weeks for routine monitoring.       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 Liver transaminases are normal.  ALP is mildly elevated. Liver function is normal.  The platelet count is normal.      A liver biopsy from 11/2017 demonstrated acute rejection. Rejection was treated with high dose steroids and taper. A liver biopsy in 6/2018 demonstrated fatty liver with no rejection. Immune Suppression  The patient is receiving immune suppression with sirolimus which is being well tolerated with only mild side effects. The immune suppression blood level is in the proper range. Will continue primary immune suppression at the current dose. Cellcept is being tolerated well   Will continue at current dose     Prednisone will be maintained a tthe low dose of 5 mg every day because of the previous rejection. NAFL  The most recent liver biopsy demonstrates NAFL. The patient was counseled regarding diet and exercise to achieve weight loss. The best diet for patients with fatty liver is one very low in carbohydrates and enriched with protein such as an Martha[employer] program.    The patient was told to consume any food products and drinks containing fructose as this enhances hepatic fat synthesis. Acute and chronic kidney injury   This is a common adverse event of immune suppression. The Screat is normal.  Aspirin and NSAIDs should be avoided since these agents can worsen renal insufficiency. Hypercholesterolemia   This can be caused by immune suppression. Serum cholesterol is normal and does not need to be treated at this time. Hypertension   This is a common side effect of immune suppression. Blood pressure is well controlled on the current treatment. Low serum magnesium   This is a common side effect of immune suppression. The patient has a normal or near normal magnesium level and does not need supplementation.     Osteoporosis   Osteoporosis is commin in patients with cirhrosis prior to liver transplant. The patient had osteoporosis on DEXA scan from 2013 prior to the LT. The patient still had osteoporosis on DEXA scan from 10/2015. The patient was advised to take calcium supplementation and Vitamin D. The patient should be treated with a bisphosphonate if the bone density does not improve. Monitoring for skin Cancer  The patient was counseled regarding increased risk of skin cancer in transplant recipients and need to have any new skin lesions evaluated by dermatology and removed if suspicious. The patient was instructed to see Dermatology annually examination. Vaccinations  Routine vaccinations against other bacterial and viral agents can be performed as long as this is with attentuatted virus. Live virus vaccines should not be administered. Annual flu vaccination should be administered. ALLERGIES:  Allergies   Allergen Reactions    Tape [Adhesive] Other (comments)     Pulls skin off    Percocet [Oxycodone-Acetaminophen] Shortness of Breath, Itching and Other (comments)     \"Burning skin\"    Statins-Hmg-Coa Reductase Inhibitors Other (comments)     liver     MEDICATIONS:  Current Outpatient Prescriptions   Medication Sig    insulin NPH/insulin regular (NOVOLIN 70/30, HUMULIN 70/30) 100 unit/mL (70-30) injection by SubCUTAneous route.  metoprolol tartrate (LOPRESSOR) 50 mg tablet Take 50 mg by mouth two (2) times a day.  ondansetron hcl (ZOFRAN) 4 mg tablet Take 4 mg by mouth every eight (8) hours as needed for Nausea.  insulin NPH (HUMULIN N) 100 unit/mL (3 mL) inpn by SubCUTAneous route. Indications: PRN    predniSONE (DELTASONE) 5 mg tablet Take 1 Tab by mouth daily.  cyproheptadine (PERIACTIN) 4 mg tablet Take 1 Tab by mouth three (3) times daily as needed. Indications: Pruritus of Skin    sirolimus (RAPAMUNE) 1 mg tablet Take 3 mg by mouth daily.  mycophenolate (CELLCEPT) 500 mg tablet Take 2 Tabs by mouth two (2) times a day.     glimepiride (AMARYL) 4 mg tablet Take 4 mg by mouth daily.  alendronate (FOSAMAX) 70 mg tablet Take 1 Tab by mouth every seven (7) days.  LISDEXAMFETAMINE DIMESYLATE (VYVANSE PO) Take  by mouth.  furosemide (LASIX) 40 mg tablet TAKE ONE TABLET BY MOUTH EVERY DAY    amoxicillin (AMOXIL) 500 mg capsule Take 4 capsules by mouth 1 hour prior to dental procedure     No current facility-administered medications for this visit. SYSTEM REVIEW NOT RELATED TO LIVER DISEASE OR REVIEWED ABOVE:  Constitution systems: Weight gain with steroids. Eyes: Negative for visual changes. ENT: Negative for sore throat, painful swallowing. Respiratory: Negative for cough, hemoptysis, SOB. Cardiology: Negative for chest pain, palpitations. GI:  Negative for constipation or diarrhea. : Negative for urinary frequency, dysuria, hematuria, nocturia. Skin: Negative for rash. Hematology: Negative for easy bruising, blood clots. Musculo-skelatal: Severe cellulitis of right hand has resolved. Neurologic: Negative for headaches, dizziness, vertigo, memory problems not related to HE. Psychology: Negative for anxiety, depression. FAMILY HISTORY:  There is no family history of liver disease. SOCIAL HISTORY:  The patient is . There are 2 children and 2 grandchildren. The patient has never used tobacco products. The patient has never consumed significant amounts of alcohol. The patient used to work for [employer] and then as an  for [employer].  She has not worked since the liver transplant. PHYSICAL EXAMINATION:  Visit Vitals    /68 (BP 1 Location: Left arm, BP Patient Position: Sitting)    Pulse 79    Temp 99.5 °F (37.5 °C) (Tympanic)    Resp 20    Ht 5' 6\" (1.676 m)    Wt 196 lb (88.9 kg)    SpO2 98%    BMI 31.64 kg/m2       General: Appears healthy. No acute distress. Eyes: Sclera anicteric. ENT: No oral lesions.   Thyroid normal.  Nodes: No adenopathy. Skin: No spider angiomata. No jaundice. No palmar erythema. Respiratory: Lungs clear to auscultation. Cardiovascular: Regular heart rate. No murmurs. No JVD. Abdomen:   Well healed liver transplant incision. Soft non-tender. Liver size normal to percussion/palpation. Spleen not palpable. No obvious ascites. Extremities: No lower edema. No muscle wasting. Neurologic:  Alert and oriented. Cranial nerves grossly intact. No asterixsis. LAB STUDIES:  From 8/2018  AST/ALT/ALP/T Bili/ALB:  32/27/126/0.2/3.3  WBC/HB/PLT/INR:  5.7/11.5/250  BUN/CREAT:  22/1.3  RAPA: 8.9    SEROLOGIES:  2/2012. HAV total negative, HBsAntigen negative, anti-HBcore negative, anti-HBsurface negative, anti-HCV negative, Ferritin 20, NEVA negative, ASMA negative, AMA negative, ceruloplsmin 34, alpha-1-antitrypsin 195, A1AT phenotype MM.  2/2013. Ferritin 434, ASMA weak positive, CMV IgG positive, EBV IgG positive, anti-HIV negative, HBA1C 5.1    LIVER HISTOLOGY:  11/2017. Slides reviewed by MLS. Acute graft rejection. 6/2018. Slides reviewed by MLS. NAFL. 40% macro and micovesicular steatosis. No ballooning. No inflammation. No fibrosis. No rejection. ENDOSCOPIC PROCEDURES:  1/2012. EGD by Dr Simran Sandoval. Esophageal varices. RADIOLOGY:  1/2012. CT scan abdomen with and without IV contrast.  Changes consistent with cirrhosis. No liver mass lesions. No dilated bile ducts. No bile duct strictures. Varices. Generalized ascites. 07/2012:  Ultrasound of the liver. Echogenic consistent with chronic liver disease, cirrhosis. No liver mass lesions. No ascites  11/2012: Ultrasound of the liver. Echogenic consistent with chronic liver disease, cirrhosis. No liver mass lesions. Small amount ascites present. 1/2013. Ultrasound of liver. Echogenic consistent with cirrhosis. No liver mass lesions. No dilated bile ducts. Mild ascites. 11/2013. Ultrasound of liver.   Normal appearing liver.  No liver mass lesions. 12/2013:  MRI of abdomen. Questionable small focal stenosis of distal common hepatic duct. Focal stenosis in mid-portal vein. OTHER TESTING:.   2/2013. ETOH negative. 8/2013:  DEXA scan - osteoporosis   10/2015. DEXA scan osteoporosis. 8/2018. TFTS.   TSH 0.38/T3 free 3.4/T4 free 1.3      MD Altagracia Bullock [address]  [address] [place] [address], [address] [place] [address], [address]  [phone]

## 2018-08-20 NOTE — MR AVS SNAPSHOT
[address] [address] 
[phone] Patient: Magdaleno Newton MRN: [id number] [birth year] Visit Information Date & Time Provider Department Dept. Phone Encounter #  
 2018 12:30 PM MD Altagracia Mendoza [address] of  [address] [id number] Follow-up Instructions Return in about 3 months (around 2018) for Saul uY. Upcoming Health Maintenance Date Due Hepatitis C Screening 1955 FOOT EXAM Q1 1963 MICROALBUMIN Q1 1963 EYE EXAM RETINAL OR DILATED Q1 1963 DTaP/Tdap/Td series (1 - Tdap) 1974 FOBT Q 1 YEAR AGE 50-75 2003 Pneumococcal 19-64 Highest Risk (2 of 3 - PCV13) 2013 ZOSTER VACCINE AGE 60> 2013 PAP AKA CERVICAL CYTOLOGY 10/18/2014 HEMOGLOBIN A1C Q6M 12/15/2016 LIPID PANEL Q1 2018 MEDICARE YEARLY EXAM 3/14/2018 Influenza Age 5 to Adult 2018 Bone Densitometry (Dexa) Screening 2018 BREAST CANCER SCRN MAMMOGRAM 2018 Allergies as of 2018  Review Complete On: 2018 By: Dorothea Birch Severity Noted Reaction Type Reactions Tape [Adhesive] High 2017    Other (comments) Pulls skin off Percocet [Oxycodone-acetaminophen]  2013    Shortness of Breath, Itching, Other (comments) \"Burning skin\" Statins-hmg-coa Reductase Inhibitors  2018    Other (comments) liver Current Immunizations  Reviewed on 2013 Name Date Influenza Vaccine 2013 Pneumococcal Vaccine (Unspecified Type) 2012 Not reviewed this visit Vitals BP Pulse Temp Resp Height(growth percentile) 141/68 (BP 1 Location: Left arm, BP Patient Position: Sitting) 79 99.5 °F (37.5 °C) (Tympanic) 20 5' 6\" (1.676 m) Weight(growth percentile) SpO2 BMI OB Status Smoking Status 196 lb (88.9 kg) 98% 31.64 kg/m2 Postmenopausal Never Smoker BMI and BSA Data Body Mass Index Body Surface Area  
 31.64 kg/m 2 2.03 m 2 Your Updated Medication List  
  
   
This list is accurate as of 8/20/18  1:10 PM.  Always use your most recent med list.  
  
  
  
  
 alendronate 70 mg tablet Commonly known as:  FOSAMAX Take 1 Tab by mouth every seven (7) days. amoxicillin 500 mg capsule Commonly known as:  AMOXIL Take 4 capsules by mouth 1 hour prior to dental procedure  
  
 cyproheptadine 4 mg tablet Commonly known as:  PERIACTIN Take 1 Tab by mouth three (3) times daily as needed. Indications: Pruritus of Skin  
  
 furosemide 40 mg tablet Commonly known as:  LASIX TAKE ONE TABLET BY MOUTH EVERY DAY  
  
 glimepiride 4 mg tablet Commonly known as:  AMARYL Take 4 mg by mouth daily. insulin  unit/mL (3 mL) Inpn Commonly known as:  HUMULIN N  
by SubCUTAneous route. Indications: PRN  
  
 insulin NPH/insulin regular 100 unit/mL (70-30) injection Commonly known as:  NOVOLIN 70/30, HUMULIN 70/30  
by SubCUTAneous route. LOPRESSOR 50 mg tablet Generic drug:  metoprolol tartrate Take 50 mg by mouth two (2) times a day. mycophenolate 500 mg tablet Commonly known as:  CELLCEPT Take 2 Tabs by mouth two (2) times a day. predniSONE 5 mg tablet Commonly known as:  Liliana Mons Take 1 Tab by mouth daily. sirolimus 1 mg tablet Commonly known as:  RAPAMUNE Take 3 mg by mouth daily. VYVANSE PO Take  by mouth. ZOFRAN 4 mg tablet Generic drug:  ondansetron hcl Take 4 mg by mouth every eight (8) hours as needed for Nausea. Follow-up Instructions Return in about 3 months (around 11/20/2018) for Edna Diez. Introducing [facility]! formerly [facility] mojio introduces [employer] patient portal. Now you can access parts of your medical record, email your doctor's office, and request medication refills online. 1. In your internet browser, go to [email or web address] ([email or web address] by [employer] in [email or web address])/[email or web address] 2. Click on the First Time User? Click Here link in the Sign In box. You will see the New Member Sign Up page. 3. Enter your [employer] Access Code exactly as it appears below. You will not need to use this code after youve completed the sign-up process. If you do not sign up before the expiration date, you must request a new code. · [employer] Access Code: [email or web address] Expires: 9/20/2018  3:53 PM 
 
4. Enter the last four digits of your Social Security Number (xxxx) and Date of Birth (mm/dd/yyyy) as indicated and click Submit. You will be taken to the next sign-up page. 5. Create a [employer] ID. This will be your [employer] login ID and cannot be changed, so think of one that is secure and easy to remember. 6. Create a [employer] password. You can change your password at any time. 7. Enter your Password Reset Question and Answer. This can be used at a later time if you forget your password. 8. Enter your e-mail address. You will receive e-mail notification when new information is available in [address] 9. Click Sign Up. You can now view and download portions of your medical record. 10. Click the Download Summary menu link to download a portable copy of your medical information. If you have questions, please visit the Frequently Asked Questions section of the [employer] website. Remember, [employer] is NOT to be used for urgent needs. For medical emergencies, dial 911. Now available from your iPhone and Android! Please provide this summary of care documentation to your next provider. Your primary care clinician is listed as Zena Turcios. If you have any questions after today's visit, please call [phone].

## 2018-08-27 LAB
A-G RATIO,AGRAT: 1.4 RATIO (ref 1.1–2.6)
ABSOLUTE LYMPHOCYTE COUNT, 10803: 1.7 K/UL (ref 1–4.8)
ALBUMIN SERPL-MCNC: 4 G/DL (ref 3.5–5)
ALP SERPL-CCNC: 169 U/L (ref 40–120)
ALT SERPL-CCNC: 46 U/L (ref 5–40)
ANION GAP SERPL CALC-SCNC: 18 MMOL/L
AST SERPL W P-5'-P-CCNC: 45 U/L (ref 10–37)
BASOPHILS # BLD: 0 K/UL (ref 0–0.2)
BASOPHILS NFR BLD: 0 % (ref 0–2)
BILIRUB SERPL-MCNC: 0.3 MG/DL (ref 0.2–1.2)
BILIRUBIN, DIRECT,CBIL: <0.2 MG/DL (ref 0–0.3)
BUN SERPL-MCNC: 23 MG/DL (ref 6–22)
CALCIUM SERPL-MCNC: 8.8 MG/DL (ref 8.4–10.5)
CHLORIDE SERPL-SCNC: 98 MMOL/L (ref 98–110)
CO2 SERPL-SCNC: 24 MMOL/L (ref 20–32)
CREAT SERPL-MCNC: 1.5 MG/DL (ref 0.8–1.4)
EOSINOPHIL # BLD: 0.1 K/UL (ref 0–0.5)
EOSINOPHIL NFR BLD: 1 % (ref 0–6)
ERYTHROCYTE [DISTWIDTH] IN BLOOD BY AUTOMATED COUNT: 14.4 % (ref 10–15.5)
GFRAA, 66117: 41.4
GFRNA, 66118: 34.2
GLOBULIN,GLOB: 2.9 G/DL (ref 2–4)
GLUCOSE SERPL-MCNC: 197 MG/DL (ref 70–99)
GRANULOCYTES,GRANS: 55 % (ref 40–75)
HCT VFR BLD AUTO: 36.3 % (ref 35.1–48)
HGB BLD-MCNC: 11.7 G/DL (ref 11.7–16)
LYMPHOCYTES, LYMLT: 37 % (ref 20–45)
MCH RBC QN AUTO: 27 PG (ref 26–34)
MCHC RBC AUTO-ENTMCNC: 32 G/DL (ref 31–36)
MCV RBC AUTO: 82 FL (ref 80–95)
MONOCYTES # BLD: 0.3 K/UL (ref 0.1–1)
MONOCYTES NFR BLD: 6 % (ref 3–12)
NEUTROPHILS # BLD AUTO: 2.6 K/UL (ref 1.8–7.7)
PLATELET # BLD AUTO: 247 K/UL (ref 140–440)
PMV BLD AUTO: 10.2 FL (ref 9–13)
POTASSIUM SERPL-SCNC: 4.4 MMOL/L (ref 3.5–5.5)
PROT SERPL-MCNC: 6.9 G/DL (ref 6.2–8.1)
RAPAMYCIN (SIROLIMUS): 14.7 NG/ML (ref 3–20)
RBC # BLD AUTO: 4.42 M/UL (ref 3.8–5.2)
SODIUM SERPL-SCNC: 140 MMOL/L (ref 133–145)
WBC # BLD AUTO: 4.7 K/UL (ref 4–11)

## 2018-09-10 LAB
A-G RATIO,AGRAT: 1.4 RATIO (ref 1.1–2.6)
ALBUMIN SERPL-MCNC: 3.6 G/DL (ref 3.5–5)
ALP SERPL-CCNC: 163 U/L (ref 40–120)
ALT SERPL-CCNC: 44 U/L (ref 5–40)
ANION GAP SERPL CALC-SCNC: 17 MMOL/L
AST SERPL W P-5'-P-CCNC: 48 U/L (ref 10–37)
BILIRUB SERPL-MCNC: 0.2 MG/DL (ref 0.2–1.2)
BILIRUBIN, DIRECT,CBIL: <0.2 MG/DL (ref 0–0.3)
BUN SERPL-MCNC: 16 MG/DL (ref 6–22)
CALCIUM SERPL-MCNC: 8.6 MG/DL (ref 8.4–10.5)
CHLORIDE SERPL-SCNC: 101 MMOL/L (ref 98–110)
CO2 SERPL-SCNC: 24 MMOL/L (ref 20–32)
CREAT SERPL-MCNC: 1.4 MG/DL (ref 0.8–1.4)
ERYTHROCYTE [DISTWIDTH] IN BLOOD BY AUTOMATED COUNT: 14.5 % (ref 10–15.5)
GFRAA, 66117: 44.8
GFRNA, 66118: 36.9
GLOBULIN,GLOB: 2.5 G/DL (ref 2–4)
GLUCOSE SERPL-MCNC: 122 MG/DL (ref 70–99)
HCT VFR BLD AUTO: 35.3 % (ref 35.1–48)
HGB BLD-MCNC: 10.8 G/DL (ref 11.7–16)
MCH RBC QN AUTO: 26 PG (ref 26–34)
MCHC RBC AUTO-ENTMCNC: 31 G/DL (ref 31–36)
MCV RBC AUTO: 85 FL (ref 80–95)
PLATELET # BLD AUTO: 200 K/UL (ref 140–440)
PMV BLD AUTO: 10.2 FL (ref 9–13)
POTASSIUM SERPL-SCNC: 4.1 MMOL/L (ref 3.5–5.5)
PROT SERPL-MCNC: 6.1 G/DL (ref 6.2–8.1)
RAPAMYCIN (SIROLIMUS): 9.8 NG/ML (ref 3–20)
RBC # BLD AUTO: 4.14 M/UL (ref 3.8–5.2)
SODIUM SERPL-SCNC: 142 MMOL/L (ref 133–145)
WBC # BLD AUTO: 3.1 K/UL (ref 4–11)

## 2018-09-24 LAB
A-G RATIO,AGRAT: 1.3 RATIO (ref 1.1–2.6)
ABSOLUTE LYMPHOCYTE COUNT, 10803: 1.7 K/UL (ref 1–4.8)
ALBUMIN SERPL-MCNC: 3.6 G/DL (ref 3.5–5)
ALP SERPL-CCNC: 141 U/L (ref 40–120)
ALT SERPL-CCNC: 32 U/L (ref 5–40)
ANION GAP SERPL CALC-SCNC: 15 MMOL/L
AST SERPL W P-5'-P-CCNC: 37 U/L (ref 10–37)
BASOPHILS # BLD: 0 K/UL (ref 0–0.2)
BASOPHILS NFR BLD: 0 % (ref 0–2)
BILIRUB SERPL-MCNC: 0.2 MG/DL (ref 0.2–1.2)
BILIRUBIN, DIRECT,CBIL: <0.2 MG/DL (ref 0–0.3)
BUN SERPL-MCNC: 15 MG/DL (ref 6–22)
CALCIUM SERPL-MCNC: 8.8 MG/DL (ref 8.4–10.5)
CHLORIDE SERPL-SCNC: 99 MMOL/L (ref 98–110)
CO2 SERPL-SCNC: 24 MMOL/L (ref 20–32)
CREAT SERPL-MCNC: 1.4 MG/DL (ref 0.8–1.4)
EOSINOPHIL # BLD: 0 K/UL (ref 0–0.5)
EOSINOPHIL NFR BLD: 1 % (ref 0–6)
ERYTHROCYTE [DISTWIDTH] IN BLOOD BY AUTOMATED COUNT: 14 % (ref 10–15.5)
GFRAA, 66117: 44.6
GFRNA, 66118: 36.8
GLOBULIN,GLOB: 2.8 G/DL (ref 2–4)
GLUCOSE SERPL-MCNC: 134 MG/DL (ref 70–99)
GRANULOCYTES,GRANS: 52 % (ref 40–75)
HCT VFR BLD AUTO: 38.2 % (ref 35.1–48)
HGB BLD-MCNC: 12.4 G/DL (ref 11.7–16)
LYMPHOCYTES, LYMLT: 39 % (ref 20–45)
MCH RBC QN AUTO: 27 PG (ref 26–34)
MCHC RBC AUTO-ENTMCNC: 33 G/DL (ref 31–36)
MCV RBC AUTO: 82 FL (ref 80–95)
MONOCYTES # BLD: 0.4 K/UL (ref 0.1–1)
MONOCYTES NFR BLD: 8 % (ref 3–12)
NEUTROPHILS # BLD AUTO: 2.3 K/UL (ref 1.8–7.7)
PLATELET # BLD AUTO: 232 K/UL (ref 140–440)
PMV BLD AUTO: 10.3 FL (ref 9–13)
POTASSIUM SERPL-SCNC: 4.3 MMOL/L (ref 3.5–5.5)
PROT SERPL-MCNC: 6.4 G/DL (ref 6.2–8.1)
RAPAMYCIN (SIROLIMUS): 11.6 NG/ML (ref 3–20)
RBC # BLD AUTO: 4.64 M/UL (ref 3.8–5.2)
SODIUM SERPL-SCNC: 138 MMOL/L (ref 133–145)
WBC # BLD AUTO: 4.5 K/UL (ref 4–11)

## 2018-10-04 ENCOUNTER — PATIENT OUTREACH (OUTPATIENT)
Dept: HEMATOLOGY | Age: 63
End: 2018-10-04

## 2018-10-04 DIAGNOSIS — Z94.4 H/O LIVER TRANSPLANT (HCC): Primary | ICD-10-CM

## 2018-10-08 LAB
A-G RATIO,AGRAT: 1.3 RATIO (ref 1.1–2.6)
ALBUMIN SERPL-MCNC: 3.7 G/DL (ref 3.5–5)
ALP SERPL-CCNC: 135 U/L (ref 40–120)
ALT SERPL-CCNC: 39 U/L (ref 5–40)
ANION GAP SERPL CALC-SCNC: 18 MMOL/L
AST SERPL W P-5'-P-CCNC: 49 U/L (ref 10–37)
BILIRUB SERPL-MCNC: 0.2 MG/DL (ref 0.2–1.2)
BILIRUBIN, DIRECT,CBIL: <0.2 MG/DL (ref 0–0.3)
BUN SERPL-MCNC: 19 MG/DL (ref 6–22)
CALCIUM SERPL-MCNC: 8.7 MG/DL (ref 8.4–10.5)
CHLORIDE SERPL-SCNC: 97 MMOL/L (ref 98–110)
CO2 SERPL-SCNC: 24 MMOL/L (ref 20–32)
CREAT SERPL-MCNC: 1.6 MG/DL (ref 0.8–1.4)
ERYTHROCYTE [DISTWIDTH] IN BLOOD BY AUTOMATED COUNT: 14.1 % (ref 10–15.5)
GFRAA, 66117: 38.4
GFRNA, 66118: 31.7
GLOBULIN,GLOB: 2.9 G/DL (ref 2–4)
GLUCOSE SERPL-MCNC: 113 MG/DL (ref 70–99)
HCT VFR BLD AUTO: 40.5 % (ref 35.1–48.3)
HGB BLD-MCNC: 13 G/DL (ref 11.7–16.1)
INR PPP: 0.9 (ref 0.89–1.29)
MCH RBC QN AUTO: 26 PG (ref 26–34)
MCHC RBC AUTO-ENTMCNC: 32 G/DL (ref 31–36)
MCV RBC AUTO: 82 FL (ref 80–95)
PLATELET # BLD AUTO: 207 K/UL (ref 140–440)
PMV BLD AUTO: 10.4 FL (ref 9–13)
POTASSIUM SERPL-SCNC: 3.9 MMOL/L (ref 3.5–5.5)
PROT SERPL-MCNC: 6.6 G/DL (ref 6.2–8.1)
PROTHROMBIN TIME: 9.5 SEC (ref 9–13)
RAPAMYCIN (SIROLIMUS): 13.3 NG/ML (ref 3–20)
RBC # BLD AUTO: 4.93 M/UL (ref 3.8–5.2)
SODIUM SERPL-SCNC: 139 MMOL/L (ref 133–145)
WBC # BLD AUTO: 3.4 K/UL (ref 4–11)

## 2018-10-22 LAB
A-G RATIO,AGRAT: 1.4 RATIO (ref 1.1–2.6)
ALBUMIN SERPL-MCNC: 3.7 G/DL (ref 3.5–5)
ALP SERPL-CCNC: 168 U/L (ref 40–120)
ALT SERPL-CCNC: 56 U/L (ref 5–40)
ANION GAP SERPL CALC-SCNC: 15 MMOL/L
AST SERPL W P-5'-P-CCNC: 60 U/L (ref 10–37)
BILIRUB SERPL-MCNC: 0.2 MG/DL (ref 0.2–1.2)
BILIRUBIN, DIRECT,CBIL: <0.2 MG/DL (ref 0–0.3)
BUN SERPL-MCNC: 20 MG/DL (ref 6–22)
CALCIUM SERPL-MCNC: 8.8 MG/DL (ref 8.4–10.5)
CHLORIDE SERPL-SCNC: 104 MMOL/L (ref 98–110)
CO2 SERPL-SCNC: 23 MMOL/L (ref 20–32)
CREAT SERPL-MCNC: 1.4 MG/DL (ref 0.8–1.4)
ERYTHROCYTE [DISTWIDTH] IN BLOOD BY AUTOMATED COUNT: 14 % (ref 10–15.5)
GFRAA, 66117: 44.6
GFRNA, 66118: 36.8
GLOBULIN,GLOB: 2.6 G/DL (ref 2–4)
GLUCOSE SERPL-MCNC: 152 MG/DL (ref 70–99)
HCT VFR BLD AUTO: 40.4 % (ref 35.1–48.3)
HGB BLD-MCNC: 12.5 G/DL (ref 11.7–16.1)
INR PPP: 0.9 (ref 0.89–1.29)
MCH RBC QN AUTO: 26 PG (ref 26–34)
MCHC RBC AUTO-ENTMCNC: 31 G/DL (ref 31–36)
MCV RBC AUTO: 84 FL (ref 80–95)
PLATELET # BLD AUTO: 198 K/UL (ref 140–440)
PMV BLD AUTO: 10.8 FL (ref 9–13)
POTASSIUM SERPL-SCNC: 4.2 MMOL/L (ref 3.5–5.5)
PROT SERPL-MCNC: 6.3 G/DL (ref 6.2–8.1)
PROTHROMBIN TIME: 9.4 SEC (ref 9–13)
RAPAMYCIN (SIROLIMUS): 9.9 NG/ML (ref 3–20)
RBC # BLD AUTO: 4.81 M/UL (ref 3.8–5.2)
SODIUM SERPL-SCNC: 142 MMOL/L (ref 133–145)
WBC # BLD AUTO: 3.6 K/UL (ref 4–11)

## 2018-11-19 LAB
A-G RATIO,AGRAT: 1.3 RATIO (ref 1.1–2.6)
ALBUMIN SERPL-MCNC: 3.8 G/DL (ref 3.5–5)
ALP SERPL-CCNC: 166 U/L (ref 40–120)
ALT SERPL-CCNC: 38 U/L (ref 5–40)
ANION GAP SERPL CALC-SCNC: 13 MMOL/L
AST SERPL W P-5'-P-CCNC: 41 U/L (ref 10–37)
BILIRUB SERPL-MCNC: 0.3 MG/DL (ref 0.2–1.2)
BILIRUBIN, DIRECT,CBIL: <0.2 MG/DL (ref 0–0.3)
BUN SERPL-MCNC: 19 MG/DL (ref 6–22)
CALCIUM SERPL-MCNC: 7.8 MG/DL (ref 8.4–10.5)
CHLORIDE SERPL-SCNC: 97 MMOL/L (ref 98–110)
CO2 SERPL-SCNC: 22 MMOL/L (ref 20–32)
CREAT SERPL-MCNC: 1.6 MG/DL (ref 0.8–1.4)
ERYTHROCYTE [DISTWIDTH] IN BLOOD BY AUTOMATED COUNT: 14.1 % (ref 10–15.5)
GFRAA, 66117: 38.4
GFRNA, 66118: 31.7
GLOBULIN,GLOB: 2.9 G/DL (ref 2–4)
GLUCOSE SERPL-MCNC: 170 MG/DL (ref 70–99)
HCT VFR BLD AUTO: 38.5 % (ref 35.1–48.3)
HGB BLD-MCNC: 12.6 G/DL (ref 11.7–16.1)
INR PPP: 0.8 (ref 0.89–1.29)
MCH RBC QN AUTO: 27 PG (ref 26–34)
MCHC RBC AUTO-ENTMCNC: 33 G/DL (ref 31–36)
MCV RBC AUTO: 82 FL (ref 80–95)
PLATELET # BLD AUTO: 219 K/UL (ref 140–440)
PMV BLD AUTO: 10.3 FL (ref 9–13)
POTASSIUM SERPL-SCNC: 4.5 MMOL/L (ref 3.5–5.5)
PROT SERPL-MCNC: 6.7 G/DL (ref 6.2–8.1)
PROTHROMBIN TIME: 9.3 SEC (ref 9–13)
RAPAMYCIN (SIROLIMUS): 9.9 NG/ML (ref 3–20)
RBC # BLD AUTO: 4.7 M/UL (ref 3.8–5.2)
SODIUM SERPL-SCNC: 132 MMOL/L (ref 133–145)
WBC # BLD AUTO: 5 K/UL (ref 4–11)

## 2018-11-27 ENCOUNTER — OFFICE VISIT (OUTPATIENT)
Dept: HEMATOLOGY | Age: 63
End: 2018-11-27

## 2018-11-27 VITALS
OXYGEN SATURATION: 100 % | WEIGHT: 186 LBS | BODY MASS INDEX: 29.89 KG/M2 | SYSTOLIC BLOOD PRESSURE: 148 MMHG | HEART RATE: 64 BPM | DIASTOLIC BLOOD PRESSURE: 74 MMHG | HEIGHT: 66 IN | TEMPERATURE: 98 F | RESPIRATION RATE: 15 BRPM

## 2018-11-27 DIAGNOSIS — Z94.4 S/P LIVER TRANSPLANT (HCC): Primary | ICD-10-CM

## 2018-11-27 RX ORDER — SIROLIMUS 1 MG/1
TABLET, FILM COATED ORAL
Qty: 360 TAB | Refills: 3 | Status: SHIPPED | OUTPATIENT
Start: 2018-11-27 | End: 2021-03-15 | Stop reason: SDUPTHER

## 2018-11-27 RX ORDER — FUROSEMIDE 40 MG/1
TABLET ORAL
Qty: 90 TAB | Refills: 3 | Status: SHIPPED | OUTPATIENT
Start: 2018-11-27 | End: 2019-12-01 | Stop reason: SDUPTHER

## 2018-11-27 RX ORDER — PANTOPRAZOLE SODIUM 40 MG/1
TABLET, DELAYED RELEASE ORAL
COMMUNITY

## 2018-11-27 NOTE — PROGRESS NOTES
[address] Ochoa MD, [address], [place], [place]       Susi Retana, ZAKI Webb, [facility]   SILVA Sheets NP        at [address]     [address] [place] [address], [address] [address]     [phone]     FAX: [phone]    at [address], [id number] [place] [address], [address]     [phone]     FAX: [phone]       Patient Care Team:  Madolyn Mohs, MD as PCP - General (Family Practice)  Eulalio Murray MD as Physician (Surgery)  Lucina Coronado MD as Physician (Plastic Surgery)  Jose C Mendiola MD as Physician (Obstetrics & Gynecology)  Leslie Restrepo MD as Physician (Radiation Oncology)  Marilu Isbell MD as Physician (Neurosurgery)  Geoff Alberto DDS as Physician ([address])  Suman Brown MD as Physician (Ophthalmology)  Sherl Fabry, O.D. as Physician (Optometry)  Cale Hernandez MD as Physician (Orthopedic Surgery)  Omar Hernandez RN as Nurse [facility], More Dobson MD as Physician (Internal Medicine)  Kayla Man MD (Gastroenterology)  Dione Kuhn MD (Internal Medicine)  Milagros Díaz MD (Hematology and Oncology)        Problem List  Date Reviewed: 11/27/2018          Codes Class Noted    Long term current use of immunosuppressive drug ICD-10-CM: Z79.899  ICD-9-CM: V58.69  4/18/2018        H/O liver transplant [facility]) ICD-10-CM: Z94.4  ICD-9-CM: V42.7  24/95/4683        Complication of transplanted liver [facility]) ICD-10-CM: T86.40  ICD-9-CM: 996.82  11/13/2013        Back pain, lumbosacral ICD-10-CM: M54.5  ICD-9-CM: 724.2, 724.6  1/27/2013        Diabetes mellitus ([address] ICD-10-CM: E11.9  ICD-9-CM: 250.00  5/28/2012        Colon polyps ICD-10-CM: K63.5  ICD-9-CM: 211.3  5/28/2012        Breast cancer ([facility] ICD-10-CM: C50.919  ICD-9-CM: 174.9  5/28/2012 Overview Signed 5/28/2012  7:00 AM by Mitchell Mckeon MD     Masectomy, chemotherapy,XRT. 1996  Tamoxifen [id number]               H/O shoulder surgery ICD-10-CM: Z98.890  ICD-9-CM: V45.89  5/28/2012    Overview Signed 5/28/2012  7:07 AM by Mitchell Mckeon MD     12/2011                   D[facility] for management of liver allograft function, to adjust immune suppression. The active problem list, all pertinent past medical history, medications, liver histology, endoscopic studies, radiologic findings and laboratory findings related to the liver disorder were reviewed with the patient. The patient underwent liver transplantation at [place], [place] in 4/2013. The patient is currently receiving sirolimus cellcept and prednsione for immune suppression. The patient had an acute graft rejection in 10/2017. She has a repeat liver biopsy because of persistent elevation in liver enzymes in 6/2018. This demonstrated steatosis. She had a post-LBX bleed into the liver. This is resolving and the liver enzymes are now down to normal.    The patient developed a severe cellulitis of the right hand which required 2 surgeries in 1/2018 and 2/2018. The hand and forearm continue to improve. The patient has no symptoms which can be attributed to the liver disorder. The patient has not experienced fatigue, fevers, chills. The patient completes all daily activities without any functional limitations. All of the issues listed in the Assessment and Plan were discussed with the patient. All questions were answered. The patient expressed a clear understanding of the above. 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 Liver transaminases are normal.  ALP is mildly elevated.   Liver function is normal.  The platelet count is normal.     A liver biopsy from 11/2017 demonstrated acute rejection. Rejection was treated with high dose steroids and taper. A liver biopsy in 6/2018 demonstrated fatty liver with no rejection. Continue labs every 4 weeks for now. Immune Suppression  The patient is receiving immune suppression with sirolimus which is being well tolerated at 3 mg/day. The immune suppression blood level is too high at 9.9. In light of the recent rejection, we will continue at the current dose as we wean off the cellcept. Will continue primary immune suppression at the current dose. Cellcept is being tolerated well at 250 mg/bid. Will stop this medication after the next blood work in 3 weeks. Prednisone has been weaned off since 08/01/2018. NAFL  The most recent liver biopsy demonstrates NAFL. The patient was counseled regarding diet and exercise to achieve weight loss. The best diet for patients with fatty liver is one very low in carbohydrates and enriched with protein such as an Martha's program.    The patient was told not to consume any food or drinks products containing fructose as this enhances hepatic fat synthesis. Acute and chronic kidney injury   This is a common adverse event of immune suppression. The Screat is normal.  Aspirin and NSAIDs should be avoided since these agents can worsen renal insufficiency. Hypercholesterolemia   This can be caused by immune suppression. Serum cholesterol is normal and does not need to be treated at this time. Hypertension   This is a common side effect of immune suppression. Blood pressure is well controlled on the current treatment. Low serum magnesium   This is a common side effect of immune suppression. The patient has a normal or near normal magnesium level and does not need supplementation. Osteoporosis   Osteoporosis is common in patients with cirhrosis prior to liver transplant. The patient had osteoporosis on DEXA scan from 2013 prior to the LT.   The patient still had osteoporosis on DEXA scan from 10/2015. The patient was advised to take calcium supplementation and Vitamin D. The patient should be treated with a bisphosphonate if the bone density does not improve. Now on Prolia since 11/14/2018. Had scan 06/2018. Monitoring for skin Cancer  The patient was counseled regarding increased risk of skin cancer in transplant recipients and need to have any new skin lesions evaluated by dermatology and removed if suspicious. The patient was instructed to see Dermatology annually examination. Vaccinations  Routine vaccinations against other bacterial and viral agents can be performed as long as this is with attentuatted virus. Live virus vaccines should not be administered. Annual flu vaccination should be administered. Has received flu vaccination 09/24/2018. ALLERGIES:  Allergies   Allergen Reactions    Tape [Adhesive] Other (comments)     Pulls skin off    Percocet [Oxycodone-Acetaminophen] Shortness of Breath, Itching and Other (comments)     \"Burning skin\"    Statins-Hmg-Coa Reductase Inhibitors Other (comments)     liver     MEDICATIONS:  Current Outpatient Medications   Medication Sig    pantoprazole (PROTONIX) 40 mg tablet pantoprazole 40 mg tablet,delayed release    insulin NPH/insulin regular (NOVOLIN 70/30, HUMULIN 70/30) 100 unit/mL (70-30) injection by SubCUTAneous route.  metoprolol tartrate (LOPRESSOR) 50 mg tablet Take 50 mg by mouth two (2) times a day.  ondansetron hcl (ZOFRAN) 4 mg tablet Take 4 mg by mouth every eight (8) hours as needed for Nausea.  amoxicillin (AMOXIL) 500 mg capsule Take 4 capsules by mouth 1 hour prior to dental procedure    cyproheptadine (PERIACTIN) 4 mg tablet Take 1 Tab by mouth three (3) times daily as needed. Indications: Pruritus of Skin    sirolimus (RAPAMUNE) 1 mg tablet Take 3 mg by mouth daily.  mycophenolate (CELLCEPT) 500 mg tablet Take 2 Tabs by mouth two (2) times a day.  (Patient taking differently: Take 250 mg by mouth two (2) times a day.)    glimepiride (AMARYL) 4 mg tablet Take 4 mg by mouth daily.  LISDEXAMFETAMINE DIMESYLATE (VYVANSE PO) Take  by mouth.  furosemide (LASIX) 40 mg tablet TAKE ONE TABLET BY MOUTH EVERY DAY     No current facility-administered medications for this visit. SYSTEM REVIEW NOT RELATED TO LIVER DISEASE OR REVIEWED ABOVE:  Constitution systems: Weight gain with steroids. Eyes: Negative for visual changes. ENT: Negative for sore throat, painful swallowing. Respiratory: Negative for cough, hemoptysis, SOB. Cardiology: Negative for chest pain, palpitations. GI:  Negative for constipation or diarrhea. : Negative for urinary frequency, dysuria, hematuria, nocturia. Skin: Negative for rash. Hematology: Negative for easy bruising, blood clots. Musculo-skelatal: Severe cellulitis of right hand has resolved. Neurologic: Negative for headaches, dizziness, vertigo, memory problems not related to HE. Psychology: Negative for anxiety, depression. FAMILY HISTORY:  There is no family history of liver disease. SOCIAL HISTORY:  The patient is . There are 2 children and 2 grandchildren. The patient has never used tobacco products. The patient has never consumed significant amounts of alcohol. The patient used to work for [employer] and then as an  for [employer].  She has not worked since the liver transplant. PHYSICAL EXAMINATION:  Visit Vitals  /74 (BP 1 Location: Right arm, BP Patient Position: Sitting)   Pulse 64   Temp 98 °F (36.7 °C)   Resp 15   Ht 5' 6\" (1.676 m)   Wt 186 lb (84.4 kg)   SpO2 100%   BMI 30.02 kg/m²       General: Appears healthy. No acute distress. Eyes: Sclera anicteric. ENT: No oral lesions. Thyroid normal.  Nodes: No adenopathy. Skin: No spider angiomata. No jaundice. No palmar erythema. Respiratory: Lungs clear to auscultation.    Cardiovascular: Regular heart rate. No murmurs. No JVD. Abdomen:   Well healed liver transplant incision. Soft non-tender. Liver size normal to percussion/palpation. Spleen not palpable. No obvious ascites. Extremities: No lower edema. No muscle wasting. Neurologic:  Alert and oriented. Cranial nerves grossly intact. No asterixsis. LAB STUDIES:  [address] & Units 11/19/2018 10/22/2018   WBC 4.0 - 11.0 K/uL 5.0 3.6 (L)   ANC 1.8 - 7.7 K/uL     HGB 11.7 - 16.1 g/dL 12.6 12.5    - 440 K/uL 219 198   INR 0.89 - 1.29 0.80 (L) 0.90   AST 10 - 37 U/L 41 (H) 60 (H)   ALT 5 - 40 U/L 38 56 (H)   Alk Phos 40 - 120 U/L 166 (H) 168 (H)   Bili, Total 0.2 - 1.2 mg/dL 0.3 0.2   Bili, Direct 0.0 - 0.3 mg/dL <0.2 <0.2   Albumin 3.5 - 5.0 g/dL 3.8 3.7   BUN 6 - 22 mg/dL 19 20   Creat 0.8 - 1.4 mg/dL 1.6 (H) 1.4   Na 133 - 145 mmol/L 132 (L) 142   K 3.5 - 5.5 mmol/L 4.5 4.2   Cl 98 - 110 mmol/L 97 (L) 104   CO2 20 - 32 mmol/L 22 23   Glucose 70 - 99 mg/dL 170 (H) 152 (H)     Liver [facility] Latest Ref Rng & Units 10/8/2018   WBC 4.0 - 11.0 K/uL 3.4 (L)   ANC 1.8 - 7.7 K/uL    HGB 11.7 - 16.1 g/dL 13.0    - 440 K/uL 207   INR 0.89 - 1.29 0.90   AST 10 - 37 U/L 49 (H)   ALT 5 - 40 U/L 39   Alk Phos 40 - 120 U/L 135 (H)   Bili, Total 0.2 - 1.2 mg/dL 0.2   Bili, Direct 0.0 - 0.3 mg/dL <0.2   Albumin 3.5 - 5.0 g/dL 3.7   BUN 6 - 22 mg/dL 19   Creat 0.8 - 1.4 mg/dL 1.6 (H)   Na 133 - 145 mmol/L 139   K 3.5 - 5.5 mmol/L 3.9   Cl 98 - 110 mmol/L 97 (L)   CO2 20 - 32 mmol/L 24   Glucose 70 - 99 mg/dL 113 (H)     From 8/2018  RAPA: 8.9    SEROLOGIES:  2/2012. HAV total negative, HBsAntigen negative, anti-HBcore negative, anti-HBsurface negative, anti-HCV negative, Ferritin 20, NEVA negative, ASMA negative, AMA negative, ceruloplsmin 34, alpha-1-antitrypsin 195, A1AT phenotype MM.  2/2013.   Ferritin 434, ASMA weak positive, CMV IgG positive, EBV IgG positive, anti-HIV negative, HBA1C 5. 1    LIVER HISTOLOGY:  11/2017. Slides reviewed by MLS. Acute graft rejection. 6/2018. Slides reviewed by MLS. NAFL. 40% macro and micovesicular steatosis. No ballooning. No inflammation. No fibrosis. No rejection. ENDOSCOPIC PROCEDURES:  1/2012. EGD by Dr Cynthia Alvarez. Esophageal varices. RADIOLOGY:  1/2012. CT scan abdomen with and without IV contrast.  Changes consistent with cirrhosis. No liver mass lesions. No dilated bile ducts. No bile duct strictures. Varices. Generalized ascites. 07/2012:  Ultrasound of the liver. Echogenic consistent with chronic liver disease, cirrhosis. No liver mass lesions. No ascites  11/2012: Ultrasound of the liver. Echogenic consistent with chronic liver disease, cirrhosis. No liver mass lesions. Small amount ascites present. 1/2013. Ultrasound of liver. Echogenic consistent with cirrhosis. No liver mass lesions. No dilated bile ducts. Mild ascites. 11/2013. Ultrasound of liver. Normal appearing liver. No liver mass lesions. 12/2013:  MRI of abdomen. Questionable small focal stenosis of distal common hepatic duct. Focal stenosis in mid-portal vein. OTHER TESTING:.   2/2013. ETOH negative. 8/2013:  DEXA scan - osteoporosis   10/2015. DEXA scan osteoporosis. 8/2018. TFTS. TSH 0.38/T3 free 3.4/T4 free 1.3    45 minutes total time spent with this patient with more than 50% of this time spent counseling and coordinating care as described above. [facility] 87 in 12 weeks. Continue labs every 4 weeks.       DARCIE Lal  Liver [place] [facility]  [facility], [address]   [address], [address]   [phone]

## 2018-11-27 NOTE — PROGRESS NOTES
1. Have you been to the ER, urgent care clinic since your last visit? Hospitalized since your last visit? Yes When: 08/06/18 Reason: [place]    2. Have you seen or consulted any other health care providers outside of the [address] [place] [address] since your last visit? Include any pap smears or colon screening.  Yes

## 2018-12-18 LAB
A-G RATIO,AGRAT: 1.4 RATIO (ref 1.1–2.6)
ALBUMIN SERPL-MCNC: 3.9 G/DL (ref 3.5–5)
ALP SERPL-CCNC: 157 U/L (ref 40–120)
ALT SERPL-CCNC: 38 U/L (ref 5–40)
ANION GAP SERPL CALC-SCNC: 19 MMOL/L
AST SERPL W P-5'-P-CCNC: 39 U/L (ref 10–37)
BILIRUB SERPL-MCNC: 0.3 MG/DL (ref 0.2–1.2)
BILIRUBIN, DIRECT,CBIL: <0.2 MG/DL (ref 0–0.3)
BUN SERPL-MCNC: 22 MG/DL (ref 6–22)
CALCIUM SERPL-MCNC: 8.8 MG/DL (ref 8.4–10.5)
CHLORIDE SERPL-SCNC: 100 MMOL/L (ref 98–110)
CO2 SERPL-SCNC: 22 MMOL/L (ref 20–32)
CREAT SERPL-MCNC: 1.3 MG/DL (ref 0.8–1.4)
ERYTHROCYTE [DISTWIDTH] IN BLOOD BY AUTOMATED COUNT: 14.6 % (ref 10–15.5)
GFRAA, 66117: 48.1
GFRNA, 66118: 39.7
GLOBULIN,GLOB: 2.7 G/DL (ref 2–4)
GLUCOSE SERPL-MCNC: 134 MG/DL (ref 70–99)
HCT VFR BLD AUTO: 39.6 % (ref 35.1–48.3)
HGB BLD-MCNC: 12.8 G/DL (ref 11.7–16.1)
INR PPP: 0.8 (ref 0.89–1.29)
MCH RBC QN AUTO: 27 PG (ref 26–34)
MCHC RBC AUTO-ENTMCNC: 32 G/DL (ref 31–36)
MCV RBC AUTO: 84 FL (ref 80–95)
PLATELET # BLD AUTO: 220 K/UL (ref 140–440)
PMV BLD AUTO: 10.9 FL (ref 9–13)
POTASSIUM SERPL-SCNC: 4.9 MMOL/L (ref 3.5–5.5)
PROT SERPL-MCNC: 6.6 G/DL (ref 6.2–8.1)
PROTHROMBIN TIME: 9.3 SEC (ref 9–13)
RAPAMYCIN (SIROLIMUS): 8.6 NG/ML (ref 3–20)
RBC # BLD AUTO: 4.71 M/UL (ref 3.8–5.2)
SODIUM SERPL-SCNC: 141 MMOL/L (ref 133–145)
WBC # BLD AUTO: 4 K/UL (ref 4–11)

## 2019-01-19 LAB
A-G RATIO,AGRAT: 1.3 RATIO (ref 1.1–2.6)
ALBUMIN SERPL-MCNC: 3.9 G/DL (ref 3.5–5)
ALP SERPL-CCNC: 147 U/L (ref 40–120)
ALT SERPL-CCNC: 47 U/L (ref 5–40)
ANION GAP SERPL CALC-SCNC: 15 MMOL/L
AST SERPL W P-5'-P-CCNC: 45 U/L (ref 10–37)
BILIRUB SERPL-MCNC: 0.2 MG/DL (ref 0.2–1.2)
BILIRUBIN, DIRECT,CBIL: <0.2 MG/DL (ref 0–0.3)
BUN SERPL-MCNC: 24 MG/DL (ref 6–22)
CALCIUM SERPL-MCNC: 8.5 MG/DL (ref 8.4–10.5)
CHLORIDE SERPL-SCNC: 101 MMOL/L (ref 98–110)
CO2 SERPL-SCNC: 26 MMOL/L (ref 20–32)
CREAT SERPL-MCNC: 1.6 MG/DL (ref 0.8–1.4)
ERYTHROCYTE [DISTWIDTH] IN BLOOD BY AUTOMATED COUNT: 13.7 % (ref 10–15.5)
GFRAA, 66117: 38.4
GFRNA, 66118: 31.7
GLOBULIN,GLOB: 3.1 G/DL (ref 2–4)
GLUCOSE SERPL-MCNC: 136 MG/DL (ref 70–99)
HCT VFR BLD AUTO: 38.8 % (ref 35.1–48.3)
HGB BLD-MCNC: 12.4 G/DL (ref 11.7–16.1)
INR PPP: 0.84 (ref 0.89–1.29)
MCH RBC QN AUTO: 27 PG (ref 26–34)
MCHC RBC AUTO-ENTMCNC: 32 G/DL (ref 31–36)
MCV RBC AUTO: 85 FL (ref 80–95)
PLATELET # BLD AUTO: 255 K/UL (ref 140–440)
PMV BLD AUTO: 10.5 FL (ref 9–13)
POTASSIUM SERPL-SCNC: 4.4 MMOL/L (ref 3.5–5.5)
PROT SERPL-MCNC: 7 G/DL (ref 6.2–8.1)
PROTHROMBIN TIME: 9.3 SEC (ref 9–13)
RAPAMYCIN (SIROLIMUS): 8.2 NG/ML (ref 3–20)
RBC # BLD AUTO: 4.57 M/UL (ref 3.8–5.2)
SODIUM SERPL-SCNC: 142 MMOL/L (ref 133–145)
WBC # BLD AUTO: 4.7 K/UL (ref 4–11)

## 2019-02-21 LAB
A-G RATIO,AGRAT: 1.4 RATIO (ref 1.1–2.6)
ALBUMIN SERPL-MCNC: 3.8 G/DL (ref 3.5–5)
ALP SERPL-CCNC: 124 U/L (ref 40–120)
ALT SERPL-CCNC: 40 U/L (ref 5–40)
ANION GAP SERPL CALC-SCNC: 10 MMOL/L
AST SERPL W P-5'-P-CCNC: 38 U/L (ref 10–37)
BILIRUB SERPL-MCNC: 0.2 MG/DL (ref 0.2–1.2)
BILIRUBIN, DIRECT,CBIL: <0.2 MG/DL (ref 0–0.3)
BUN SERPL-MCNC: 26 MG/DL (ref 6–22)
CALCIUM SERPL-MCNC: 8.6 MG/DL (ref 8.4–10.5)
CHLORIDE SERPL-SCNC: 105 MMOL/L (ref 98–110)
CO2 SERPL-SCNC: 27 MMOL/L (ref 20–32)
CREAT SERPL-MCNC: 1.5 MG/DL (ref 0.8–1.4)
ERYTHROCYTE [DISTWIDTH] IN BLOOD BY AUTOMATED COUNT: 13.8 % (ref 10–15.5)
GFRAA, 66117: 41.3
GFRNA, 66118: 34.1
GLOBULIN,GLOB: 2.8 G/DL (ref 2–4)
GLUCOSE SERPL-MCNC: 131 MG/DL (ref 70–99)
HCT VFR BLD AUTO: 37.9 % (ref 35.1–48.3)
HGB BLD-MCNC: 12.3 G/DL (ref 11.7–16.1)
INR PPP: 0.84 (ref 0.89–1.29)
MCH RBC QN AUTO: 28 PG (ref 26–34)
MCHC RBC AUTO-ENTMCNC: 33 G/DL (ref 31–36)
MCV RBC AUTO: 85 FL (ref 80–95)
PLATELET # BLD AUTO: 202 K/UL (ref 140–440)
PMV BLD AUTO: 10.5 FL (ref 9–13)
POTASSIUM SERPL-SCNC: 4.3 MMOL/L (ref 3.5–5.5)
PROT SERPL-MCNC: 6.6 G/DL (ref 6.2–8.1)
PROTHROMBIN TIME: 9.3 SEC (ref 9–13)
RAPAMYCIN (SIROLIMUS): 7.7 NG/ML (ref 3–20)
RBC # BLD AUTO: 4.45 M/UL (ref 3.8–5.2)
SODIUM SERPL-SCNC: 142 MMOL/L (ref 133–145)
WBC # BLD AUTO: 3.6 K/UL (ref 4–11)

## 2019-02-28 ENCOUNTER — OFFICE VISIT (OUTPATIENT)
Dept: HEMATOLOGY | Age: 64
End: 2019-02-28

## 2019-02-28 VITALS
BODY MASS INDEX: 29.57 KG/M2 | HEART RATE: 70 BPM | OXYGEN SATURATION: 99 % | WEIGHT: 184 LBS | RESPIRATION RATE: 18 BRPM | DIASTOLIC BLOOD PRESSURE: 66 MMHG | SYSTOLIC BLOOD PRESSURE: 149 MMHG | TEMPERATURE: 99.2 F | HEIGHT: 66 IN

## 2019-02-28 DIAGNOSIS — T86.49 OTHER COMPLICATION OF LIVER TRANSPLANT (HCC): ICD-10-CM

## 2019-02-28 DIAGNOSIS — Z94.4 LIVER REPLACED BY TRANSPLANT (HCC): ICD-10-CM

## 2019-02-28 NOTE — PROGRESS NOTES
[address] Ochoa MD, [address], [place], [place]       SILVA Denton, ZAKI Marie, MIKHAILP-BC   SILVA Gustafson NP        at [address], [address] [place] [address], Kimberley  22.     [phone]     FAX: [phone]    at [address], [address]     [phone]     FAX: [phone]       Patient Care Team:  Fish Alvarez MD as PCP - General (Family Practice)  Kelly Rivas MD as Physician (Surgery)  Ignacio Castañeda MD as Physician (Plastic Surgery)  Lalo Dunn MD as Physician (Obstetrics & Gynecology)  Edgard Elizalde MD as Physician (Neurosurgery)  Lis Sellers DDS as Physician (Dental General Practice)  Candida Montano O.D. as Physician (Optometry)  Norma Monroe RN as Nurse Navigator  Tung Mg MD (Gastroenterology)  Marge Perez MD (Internal Medicine)  Mahsa Venegas MD (Hematology and Oncology)  Kristina Soni DO (Rheumatology)  Lilli Joseph MD (Dermatology)        Problem List  Date Reviewed: 11/27/2018          Codes Class Noted    Long term current use of immunosuppressive drug ICD-10-CM: Z79.899  ICD-9-CM: V58.69  4/18/2018        H/O liver transplant [facility]) ICD-10-CM: Z94.4  ICD-9-CM: V42.7  43/88/6224        Complication of transplanted liver ([facility] ICD-10-CM: T86.40  ICD-9-CM: 996.82  11/13/2013        Back pain, lumbosacral ICD-10-CM: M54.5  ICD-9-CM: 724.2, 724.6  1/27/2013        Diabetes mellitus ([facility] ICD-10-CM: E11.9  ICD-9-CM: 250.00  5/28/2012        Colon polyps ICD-10-CM: K63.5  ICD-9-CM: 211.3  5/28/2012        Breast cancer ([facility] ICD-10-CM: C50.919  ICD-9-CM: 174.9  5/28/2012    Overview Signed 5/28/2012  7:00 AM by Bernice Dumont MD     Masectomy, chemotherapy,XRT.  1996  Tamoxifen [id number]               H/O shoulder surgery ICD-10-CM: Z98.890  ICD-9-CM: V45.89  5/28/2012    Overview Signed 5/28/2012  7:07 AM by Marcial Spencer MD     12/2011                   Bisi Rascon returns to the [address] [address] of [address] [place] [address] for management of liver allograft function, to adjust immune suppression. The active problem list, all pertinent past medical history, medications, liver histology, endoscopic studies, radiologic findings and laboratory findings related to the liver disorder were reviewed with the patient. The patient underwent liver transplantation at [address] in 4/2013. The patient is currently receiving sirolimus cellcept and sirolimus for immune suppression. The patient had an acute graft rejection in 10/2017. She has a repeat liver biopsy because of persistent elevation in liver enzymes in 6/2018. This demonstrated steatosis. She had a post-LBX bleed into the liver. This appears to have resolved and the liver enzymes are now down to normal.    The patient developed a severe cellulitis of the right hand which required 2 surgeries in 1/2018 and 2/2018. The hand and forearm continue to improve. The patient has no symptoms which can be attributed to the liver disorder. The patient has not experienced fatigue, fevers, chills. The patient completes all daily activities without any functional limitations. All of the issues listed in the Assessment and Plan were discussed with the patient. All questions were answered. 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 Liver transaminases are normal.  ALP is mildly elevated. Liver function is normal.  The platelet count is normal.     A liver biopsy from 11/2017 demonstrated acute rejection. Rejection was treated with high dose steroids and taper. A liver biopsy in 6/2018 demonstrated fatty liver with no rejection. Continue labs every 4 weeks for now.       Immune Suppression  The patient is receiving immune suppression with sirolimus which is being well tolerated at 3 mg/day. The immune suppression blood level is too high at 7.7. I reduced the dose of Sirolimus to 2 mg/day today. We will continue the Cellcept at 250 mg bid for now. Prednisone has been weaned off since 08/01/2018. NAFL  The most recent liver biopsy demonstrates NAFL. The patient was counseled regarding diet and exercise to achieve weight loss. The best diet for patients with fatty liver is one very low in carbohydrates and enriched with protein such as an Martha[employer] program.    The patient was told not to consume any food or drinks products containing fructose as this enhances hepatic fat synthesis. Acute and chronic kidney injury   This is a common adverse event of immune suppression. The Screat is normal.  Aspirin and NSAIDs should be avoided since these agents can worsen renal insufficiency. Hypercholesterolemia   This can be caused by immune suppression. Serum cholesterol is normal and does not need to be treated at this time. Hypertension   This is a common side effect of immune suppression. Blood pressure is well controlled on the current treatment. Low serum magnesium   This is a common side effect of immune suppression. The patient has a normal or near normal magnesium level and does not need supplementation. Osteoporosis   Osteoporosis is common in patients with cirhrosis prior to liver transplant. The patient had osteoporosis on DEXA scan from 2013 prior to the LT. The patient still had osteoporosis on DEXA scan from 10/2015. The patient was advised to take calcium supplementation and Vitamin D. The patient should be treated with a bisphosphonate if the bone density does not improve. Now on Prolia since 11/14/2018. Had scan 06/2018.      Monitoring for skin Cancer  The patient was counseled regarding increased risk of skin cancer in transplant recipients and need to have any new skin lesions evaluated by dermatology and removed if suspicious. The patient was instructed to see Dermatology annually examination. Vaccinations  Routine vaccinations against other bacterial and viral agents can be performed as long as this is with attentuatted virus. Live virus vaccines should not be administered. Annual flu vaccination should be administered. Has received flu vaccination 09/24/2018. ALLERGIES:  Allergies   Allergen Reactions    Tape [Adhesive] Other (comments)     Pulls skin off    Percocet [Oxycodone-Acetaminophen] Shortness of Breath, Itching and Other (comments)     \"Burning skin\"    Statins-Hmg-Coa Reductase Inhibitors Other (comments)     liver     MEDICATIONS:  Current Outpatient Medications   Medication Sig    denosumab (PROLIA) 60 mg/mL injection 60 mg by SubCUTAneous route.  furosemide (LASIX) 40 mg tablet TAKE ONE TABLET BY MOUTH EVERY DAY    sirolimus (RAPAMUNE) 1 mg tablet Take 4 tabs by mouth daily    insulin NPH/insulin regular (NOVOLIN 70/30, HUMULIN 70/30) 100 unit/mL (70-30) injection by SubCUTAneous route.  metoprolol tartrate (LOPRESSOR) 50 mg tablet Take 50 mg by mouth two (2) times a day.  mycophenolate (CELLCEPT) 500 mg tablet Take 2 Tabs by mouth two (2) times a day. (Patient taking differently: Take 250 mg by mouth two (2) times a day.)    glimepiride (AMARYL) 4 mg tablet Take 4 mg by mouth daily.  LISDEXAMFETAMINE DIMESYLATE (VYVANSE PO) Take  by mouth.  pantoprazole (PROTONIX) 40 mg tablet pantoprazole 40 mg tablet,delayed release    ondansetron hcl (ZOFRAN) 4 mg tablet Take 4 mg by mouth every eight (8) hours as needed for Nausea.  amoxicillin (AMOXIL) 500 mg capsule Take 4 capsules by mouth 1 hour prior to dental procedure    cyproheptadine (PERIACTIN) 4 mg tablet Take 1 Tab by mouth three (3) times daily as needed.  Indications: Pruritus of Skin     No current facility-administered medications for this visit.        SYSTEM REVIEW NOT RELATED TO LIVER DISEASE OR REVIEWED ABOVE:  Constitution systems: Weight gain with steroids. Eyes: Negative for visual changes. ENT: Negative for sore throat, painful swallowing. Respiratory: Negative for cough, hemoptysis, SOB. Cardiology: Negative for chest pain, palpitations. GI:  Negative for constipation or diarrhea. : Negative for urinary frequency, dysuria, hematuria, nocturia. Skin: Negative for rash. Hematology: Negative for easy bruising, blood clots. Musculo-skelatal: Severe cellulitis of right hand has resolved. Neurologic: Negative for headaches, dizziness, vertigo, memory problems not related to HE. Psychology: Negative for anxiety, depression. FAMILY HISTORY:  There is no family history of liver disease. SOCIAL HISTORY:  The patient is . There are 2 children and 2 grandchildren. The patient has never used tobacco products. The patient has never consumed significant amounts of alcohol. The patient used to work for [employer] and then as an  for [employer].  She has not worked since the liver transplant. PHYSICAL EXAMINATION:  Visit Vitals  /66 (BP 1 Location: Left arm, BP Patient Position: Sitting)   Pulse 70   Temp 99.2 °F (37.3 °C) (Tympanic)   Resp 18   Ht 5' 6\" (1.676 m)   Wt 184 lb (83.5 kg)   SpO2 99%   BMI 29.70 kg/m²       General: Appears healthy. No acute distress. Eyes: Sclera anicteric. ENT: No oral lesions. Thyroid normal.  Nodes: No adenopathy. Skin: No spider angiomata. No jaundice. No palmar erythema. Respiratory: Lungs clear to auscultation. Cardiovascular: Regular heart rate. No murmurs. No JVD. Abdomen:   Well healed liver transplant incision. Soft non-tender. Liver size normal to percussion/palpation. Spleen not palpable. No obvious ascites. Extremities: No lower edema. No muscle wasting. Neurologic:  Alert and oriented. Cranial nerves grossly intact. No asterixsis. LAB STUDIES:  [facility] [place] [facility] [address] Units 2/21/2019 1/19/2019   WBC 4.0 - 11.0 K/uL 3.6 (L) 4.7   ANC 1.8 - 7.7 K/uL     HGB 11.7 - 16.1 g/dL 12.3 12.4    - 440 K/uL 202 255   INR 0.89 - 1.29 0.84 (L) 0.84 (L)   AST 10 - 37 U/L 38 (H) 45 (H)   ALT 5 - 40 U/L 40 47 (H)   Alk Phos 40 - 120 U/L 124 (H) 147 (H)   Bili, Total 0.2 - 1.2 mg/dL 0.2 0.2   Bili, Direct 0.0 - 0.3 mg/dL <0.2 <0.2   Albumin 3.5 - 5.0 g/dL 3.8 3.9   BUN 6 - 22 mg/dL 26 (H) 24 (H)   Creat 0.8 - 1.4 mg/dL 1.5 (H) 1.6 (H)   Na 133 - 145 mmol/L 142 142   K 3.5 - 5.5 mmol/L 4.3 4.4   Cl 98 - 110 mmol/L 105 101   CO2 20 - 32 mmol/L 27 26   Glucose 70 - 99 mg/dL 131 (H) 136 (H)       SEROLOGIES:  2/2012. HAV total negative, HBsAntigen negative, anti-HBcore negative, anti-HBsurface negative, anti-HCV negative, Ferritin 20, NEVA negative, ASMA negative, AMA negative, ceruloplsmin 34, alpha-1-antitrypsin 195, A1AT phenotype MM.  2/2013. Ferritin 434, ASMA weak positive, CMV IgG positive, EBV IgG positive, anti-HIV negative, HBA1C 5.1    LIVER HISTOLOGY:  11/2017. Slides reviewed by MLS. Acute graft rejection. 6/2018. Slides reviewed by MLS. NAFL. 40% macro and micovesicular steatosis. No ballooning. No inflammation. No fibrosis. No rejection. ENDOSCOPIC PROCEDURES:  1/2012. EGD by Dr Norbert Farmer. Esophageal varices. RADIOLOGY:  1/2012. CT scan abdomen with and without IV contrast.  Changes consistent with cirrhosis. No liver mass lesions. No dilated bile ducts. No bile duct strictures. Varices. Generalized ascites. 07/2012:  Ultrasound of the liver. Echogenic consistent with chronic liver disease, cirrhosis. No liver mass lesions. No ascites  11/2012: Ultrasound of the liver. Echogenic consistent with chronic liver disease, cirrhosis. No liver mass lesions. Small amount ascites present. 1/2013. Ultrasound of liver.    Echogenic consistent with cirrhosis. No liver mass lesions. No dilated bile ducts. Mild ascites. 11/2013. Ultrasound of liver. Normal appearing liver. No liver mass lesions. 12/2013:  MRI of abdomen. Questionable small focal stenosis of distal common hepatic duct. Focal stenosis in mid-portal vein. OTHER TESTING:.   2/2013. ETOH negative. 8/2013:  DEXA scan - osteoporosis   10/2015. DEXA scan osteoporosis. 8/2018. TFTS. TSH 0.38/T3 free 3.4/T4 free 1.3    45 minutes total time spent with this patient with more than 50% of this time spent counseling and coordinating care as described above. [address] [address] in 12 weeks. Continue labs every 4 weeks.       DARCIE Mtz  [facility] [place] of [address], [address] [place] [address]   [phone]

## 2019-03-05 RX ORDER — MYCOPHENOLATE MOFETIL 500 MG/1
TABLET ORAL
Qty: 120 TAB | Refills: 6 | Status: SHIPPED | OUTPATIENT
Start: 2019-03-05 | End: 2019-03-06 | Stop reason: SDUPTHER

## 2019-03-05 RX ORDER — MYCOPHENOLATE MOFETIL 500 MG/1
TABLET ORAL
Qty: 120 TAB | Refills: 6 | Status: SHIPPED | OUTPATIENT
Start: 2019-03-05 | End: 2019-03-05 | Stop reason: SDUPTHER

## 2019-03-06 RX ORDER — MYCOPHENOLATE MOFETIL 500 MG/1
TABLET ORAL
Qty: 120 TAB | Refills: 6 | Status: SHIPPED | OUTPATIENT
Start: 2019-03-06 | End: 2021-03-15 | Stop reason: SDUPTHER

## 2019-06-04 ENCOUNTER — OFFICE VISIT (OUTPATIENT)
Dept: HEMATOLOGY | Age: 64
End: 2019-06-04

## 2019-06-04 ENCOUNTER — TELEPHONE (OUTPATIENT)
Dept: HEMATOLOGY | Age: 64
End: 2019-06-04

## 2019-06-04 VITALS
HEART RATE: 70 BPM | OXYGEN SATURATION: 99 % | HEIGHT: 66 IN | BODY MASS INDEX: 30.22 KG/M2 | SYSTOLIC BLOOD PRESSURE: 140 MMHG | DIASTOLIC BLOOD PRESSURE: 65 MMHG | WEIGHT: 188 LBS | TEMPERATURE: 97.7 F

## 2019-06-04 DIAGNOSIS — Z94.4 S/P LIVER TRANSPLANT (HCC): Primary | ICD-10-CM

## 2019-06-04 RX ORDER — LISINOPRIL 10 MG/1
10 TABLET ORAL DAILY
COMMUNITY

## 2019-06-04 NOTE — PROGRESS NOTES
1. Have you been to the ER, urgent care clinic since your last visit? Hospitalized since your last visit? Yes .    2. Have you seen or consulted any other health care providers outside of the [address] [place] [address] since your last visit? Include any pap smears or colon screening.  Yes

## 2019-06-04 NOTE — TELEPHONE ENCOUNTER
Pt would like to know if she need to have labs drawn on July 25,2019 (which is 4 weeks since her last draw) or 1 week before her appointment?

## 2019-06-04 NOTE — PROGRESS NOTES
[facility], [place], [address], [place], [place]      ZAKI Reyes, ACNP-BC     April Jose Antonio Yoon, SILVA Newsome, SILVA Grayato [address] [place] [address]    at [address], [address] [place] [address], [facility] 22.    [phone]    FAX: [address] [place] [address]    at [address], [address]    [phone]    FAX: [phone]         Patient Care Team:  Sasha Paige MD as PCP - General (Family Practice)  Yin Park MD as Physician (Surgery)  Milagros Garcia MD as Physician (Obstetrics & Gynecology)  Lev Bates MD as Physician (Neurosurgery)  Edwar Henriquez DDS as Physician (Dental General Practice)  Luciana Ozuna O.D. as Physician (Optometry)  Anika Galvan RN as Nurse Navigator  Helga Culver MD (Gastroenterology)  Olga Lidia Arora MD (Internal Medicine)  Cecily Bah MD (Hematology and Oncology)  Satya Campos DO (Rheumatology)  Sharla Kaplan MD (Dermatology)        Problem List  Date Reviewed: 2/28/2019          Codes Class Noted    Long term current use of immunosuppressive drug ICD-10-CM: Z79.899  ICD-9-CM: V58.69  4/18/2018        H/O liver transplant [facility]) ICD-10-CM: Z94.4  ICD-9-CM: V42.7  88/21/5670        Complication of transplanted liver [facility]) ICD-10-CM: T86.40  ICD-9-CM: 996.82  11/13/2013        Back pain, lumbosacral ICD-10-CM: M54.5  ICD-9-CM: 724.2, 724.6  1/27/2013        Diabetes mellitus ([facility] ICD-10-CM: E11.9  ICD-9-CM: 250.00  5/28/2012        Colon polyps ICD-10-CM: K63.5  ICD-9-CM: 211.3  5/28/2012        Breast cancer ([facility] ICD-10-CM: C50.919  ICD-9-CM: 174.9  5/28/2012    Overview Signed 5/28/2012  7:00 AM by Thomas Peters MD Masectomy, chemotherapy,XRT. 1996  Tamoxifen [id number]               H/O shoulder surgery ICD-10-CM: Z98.890  ICD-9-CM: V45.89  5/28/2012    Overview Signed 5/28/2012  7:07 AM by Trinidad Scruggs MD     12/2011                   Clara Gómez returns to the [address] for management of liver allograft function, to adjust immune suppression. The active problem list, all pertinent past medical history, medications, liver histology, endoscopic studies, radiologic findings and laboratory findings related to the liver disorder were reviewed with the patient. The patient underwent liver transplantation at [place], [place] in 4/2013. The patient is currently receiving sirolimus cellcept and sirolimus for immune suppression. The patient had an acute graft rejection in 10/2017. She has a repeat liver biopsy because of persistent elevation in liver enzymes in 6/2018. This demonstrated steatosis. She had a post-LBX bleed into the liver. This appears to have resolved and the liver enzymes are now down to normal.    The patient developed a severe cellulitis of the right hand which required 2 surgeries in 1/2018 and 2/2018. The hand and forearm continue to improve. The patient has no symptoms which can be attributed to the liver disorder. The patient has not experienced fatigue, fevers, chills. The patient completes all daily activities without any functional limitations. All of the issues listed in the Assessment and Plan were discussed with the patient. All questions were answered. Since the last office appointment the patient has:  Had no changes in the liver disease. Had a pelvic MRI which showed an enhancing endocervical canal 1.5 cm lesion. Has appointment tomorrow with Dr. Crystal Stein to discuss the MRI. 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   Liver transaminases are normal.  ALP is mildly elevated. Liver function is normal.  The platelet count is normal.     A liver biopsy from 11/2017 demonstrated acute rejection. Rejection was treated with high dose steroids and taper. A liver biopsy in 6/2018 demonstrated fatty liver with no rejection. Continue labs every 4 weeks for now. Immune Suppression  The patient is receiving immune suppression with sirolimus which is being well tolerated at 3 mg/day. The immune suppression blood level is too high at 9.1. I had reduced the dose of Sirolimus to 2 mg/day on 02/28/2019. Further reduced the sirolimus to 1 mg/day today with labs to be repeated in 4 weeks. We will continue the Cellcept at 250 mg bid for now. Prednisone has been weaned off since 08/01/2018. NAFL  The most recent liver biopsy demonstrates NAFL. The patient was counseled regarding diet and exercise to achieve weight loss. The best diet for patients with fatty liver is one very low in carbohydrates and enriched with protein such as an Martha[employer] program.    The patient was told not to consume any food or drinks products containing fructose as this enhances hepatic fat synthesis. Acute and chronic kidney injury   This is a common adverse event of immune suppression. The Screat is normal.  Aspirin and NSAIDs should be avoided since these agents can worsen renal insufficiency. Hypercholesterolemia   This can be caused by immune suppression. Serum cholesterol is normal and does not need to be treated at this time. Hypertension   This is a common side effect of immune suppression. Blood pressure is well controlled on the current treatment. Low serum magnesium   This is a common side effect of immune suppression. The patient has a normal or near normal magnesium level and does not need supplementation. Osteoporosis   Osteoporosis is common in patients with cirhrosis prior to liver transplant.   The patient had osteoporosis on DEXA scan from 2013 prior to the LT. The patient still had osteoporosis on DEXA scan from 10/2015. The patient was advised to take calcium supplementation and Vitamin D. The patient should be treated with a bisphosphonate if the bone density does not improve. Now on Prolia since 11/14/2018. Had scan 06/2018. Monitoring for skin Cancer  The patient was counseled regarding increased risk of skin cancer in transplant recipients and need to have any new skin lesions evaluated by dermatology and removed if suspicious. The patient was instructed to see Dermatology annually examination. Vaccinations  Routine vaccinations against other bacterial and viral agents can be performed as long as this is with attentuatted virus. Live virus vaccines should not be administered. Annual flu vaccination should be administered. Has received flu vaccination 09/24/2018. ALLERGIES:  Allergies   Allergen Reactions    Tape [Adhesive] Other (comments)     Pulls skin off    Percocet [Oxycodone-Acetaminophen] Shortness of Breath, Itching and Other (comments)     \"Burning skin\"    Statins-Hmg-Coa Reductase Inhibitors Other (comments)     liver     MEDICATIONS:  Current Outpatient Medications   Medication Sig    lisinopril (PRINIVIL, ZESTRIL) 10 mg tablet Take 10 mg by mouth daily.  mycophenolate (CELLCEPT) 500 mg tablet TAKE 2 TABLETS BY MOUTH TWICE DAILY (Patient taking differently: TAKE 2 TABLETS BY MOUTH TWICE DAILY  Indications: taking differently, 250 mg bid)    pantoprazole (PROTONIX) 40 mg tablet pantoprazole 40 mg tablet,delayed release    denosumab (PROLIA) 60 mg/mL injection 60 mg by SubCUTAneous route.     furosemide (LASIX) 40 mg tablet TAKE ONE TABLET BY MOUTH EVERY DAY    sirolimus (RAPAMUNE) 1 mg tablet Take 4 tabs by mouth daily (Patient taking differently: Take 4 tabs by mouth daily  Indications: taking 2 mg/day)    insulin NPH/insulin regular (NOVOLIN 70/30, HUMULIN 70/30) 100 unit/mL (70-30) injection by SubCUTAneous route.  metoprolol tartrate (LOPRESSOR) 50 mg tablet Take 25 mg by mouth two (2) times a day.  ondansetron hcl (ZOFRAN) 4 mg tablet Take 4 mg by mouth every eight (8) hours as needed for Nausea.  amoxicillin (AMOXIL) 500 mg capsule Take 4 capsules by mouth 1 hour prior to dental procedure    cyproheptadine (PERIACTIN) 4 mg tablet Take 1 Tab by mouth three (3) times daily as needed. Indications: Pruritus of Skin    glimepiride (AMARYL) 4 mg tablet Take 4 mg by mouth daily. No current facility-administered medications for this visit. SYSTEM REVIEW NOT RELATED TO LIVER DISEASE OR REVIEWED ABOVE:  Constitution systems: Weight gain with steroids. Eyes: Negative for visual changes. ENT: Negative for sore throat, painful swallowing. Respiratory: Negative for cough, hemoptysis, SOB. Cardiology: Negative for chest pain, palpitations. GI:  Negative for constipation or diarrhea. : Negative for urinary frequency, dysuria, hematuria, nocturia. Skin: Negative for rash. Hematology: Negative for easy bruising, blood clots. Musculo-skelatal: Severe cellulitis of right hand has resolved. Neurologic: Negative for headaches, dizziness, vertigo, memory problems not related to HE. Psychology: Negative for anxiety, depression. FAMILY HISTORY:  There is no family history of liver disease. SOCIAL HISTORY:  The patient is . There are 2 children and 2 grandchildren. The patient has never used tobacco products. The patient has never consumed significant amounts of alcohol. The patient used to work for [employer] and then as an  for [employer].  She has not worked since the liver transplant.     PHYSICAL EXAMINATION:  Visit Vitals  /65 (BP 1 Location: Right arm, BP Patient Position: Sitting)   Pulse 70   Temp 97.7 °F (36.5 °C)   Ht 5' 6\" (1.676 m)   Wt 188 lb (85.3 kg)   SpO2 99%   BMI 30.34 kg/m²       General: Appears healthy. No acute distress. Eyes: Sclera anicteric. ENT: No oral lesions. Thyroid normal.  Nodes: No adenopathy. Skin: No spider angiomata. No jaundice. No palmar erythema. Respiratory: Lungs clear to auscultation. Cardiovascular: Regular heart rate. No murmurs. No JVD. Abdomen:   Well healed liver transplant incision. Soft non-tender. Liver size normal to percussion/palpation. Spleen not palpable. No obvious ascites. Extremities: No lower edema. No muscle wasting. Neurologic:  Alert and oriented. Cranial nerves grossly intact. No asterixsis. LAB STUDIES:  [facility] [place] of [address] Units 2/21/2019 1/19/2019   WBC 4.0 - 11.0 K/uL 3.6 (L) 4.7   ANC 1.8 - 7.7 K/uL     HGB 11.7 - 16.1 g/dL 12.3 12.4    - 440 K/uL 202 255   INR 0.89 - 1.29 0.84 (L) 0.84 (L)   AST 10 - 37 U/L 38 (H) 45 (H)   ALT 5 - 40 U/L 40 47 (H)   Alk Phos 40 - 120 U/L 124 (H) 147 (H)   Bili, Total 0.2 - 1.2 mg/dL 0.2 0.2   Bili, Direct 0.0 - 0.3 mg/dL <0.2 <0.2   Albumin 3.5 - 5.0 g/dL 3.8 3.9   BUN 6 - 22 mg/dL 26 (H) 24 (H)   Creat 0.8 - 1.4 mg/dL 1.5 (H) 1.6 (H)   Na 133 - 145 mmol/L 142 142   K 3.5 - 5.5 mmol/L 4.3 4.4   Cl 98 - 110 mmol/L 105 101   CO2 20 - 32 mmol/L 27 26   Glucose 70 - 99 mg/dL 131 (H) 136 (H)       SEROLOGIES:  2/2012. HAV total negative, HBsAntigen negative, anti-HBcore negative, anti-HBsurface negative, anti-HCV negative, Ferritin 20, NEVA negative, ASMA negative, AMA negative, ceruloplsmin 34, alpha-1-antitrypsin 195, A1AT phenotype MM.  2/2013. Ferritin 434, ASMA weak positive, CMV IgG positive, EBV IgG positive, anti-HIV negative, HBA1C 5.1    LIVER HISTOLOGY:  11/2017. Slides reviewed by MLS. Acute graft rejection. 6/2018. Slides reviewed by MLS. NAFL. 40% macro and micovesicular steatosis. No ballooning. No inflammation. No fibrosis. No rejection. ENDOSCOPIC PROCEDURES:  1/2012. EGD by Dr Aurora Mcallister.   Esophageal varices. RADIOLOGY:  1/2012. CT scan abdomen with and without IV contrast.  Changes consistent with cirrhosis. No liver mass lesions. No dilated bile ducts. No bile duct strictures. Varices. Generalized ascites. 07/2012:  Ultrasound of the liver. Echogenic consistent with chronic liver disease, cirrhosis. No liver mass lesions. No ascites  11/2012: Ultrasound of the liver. Echogenic consistent with chronic liver disease, cirrhosis. No liver mass lesions. Small amount ascites present. 1/2013. Ultrasound of liver. Echogenic consistent with cirrhosis. No liver mass lesions. No dilated bile ducts. Mild ascites. 11/2013. Ultrasound of liver. Normal appearing liver. No liver mass lesions. 12/2013:  MRI of abdomen. Questionable small focal stenosis of distal common hepatic duct. Focal stenosis in mid-portal vein. 02/2015. Ultrasound of the liver. Consistent with cirrhosis. 06/2018. Ultrasound of the liver. Mixed echogenicity, complex collection in the medial right hepatic lobe most in keeping with hematoma. 05/2019. Pelvic MRI w/wo contrast.  Enhancing endocervical canal 1.5 cm lesion. OTHER TESTING:.   2/2013. ETOH negative. 8/2013:  DEXA scan - osteoporosis   10/2015. DEXA scan osteoporosis. 8/2018. TFTS. TSH 0.38/T3 free 3.4/T4 free 1.3    45 minutes total time spent with this patient with more than 50% of this time spent counseling and coordinating care as described above. [address] [address] in 8 weeks. Continue labs every 4 weeks.       DARCIE Sahu  [facility] [place] of [address], [address] [place] [address] Marta Shepard, [address] [place] [address]   [phone]

## 2019-07-25 LAB
A-G RATIO,AGRAT: 1.5 RATIO (ref 1.1–2.6)
ABSOLUTE LYMPHOCYTE COUNT, 10803: 1.8 K/UL (ref 1–4.8)
ALBUMIN SERPL-MCNC: 4.1 G/DL (ref 3.5–5)
ALP SERPL-CCNC: 85 U/L (ref 40–120)
ALT SERPL-CCNC: 46 U/L (ref 5–40)
ANION GAP SERPL CALC-SCNC: 12 MMOL/L
AST SERPL W P-5'-P-CCNC: 38 U/L (ref 10–37)
BASOPHILS # BLD: 0 K/UL (ref 0–0.2)
BASOPHILS NFR BLD: 0 % (ref 0–2)
BILIRUB SERPL-MCNC: 0.1 MG/DL (ref 0.2–1.2)
BILIRUBIN, DIRECT,CBIL: <0.2 MG/DL (ref 0–0.3)
BUN SERPL-MCNC: 30 MG/DL (ref 6–22)
CALCIUM SERPL-MCNC: 9.2 MG/DL (ref 8.4–10.5)
CHLORIDE SERPL-SCNC: 106 MMOL/L (ref 98–110)
CO2 SERPL-SCNC: 25 MMOL/L (ref 20–32)
CREAT SERPL-MCNC: 1.7 MG/DL (ref 0.8–1.4)
EOSINOPHIL # BLD: 0.1 K/UL (ref 0–0.5)
EOSINOPHIL NFR BLD: 2 % (ref 0–6)
ERYTHROCYTE [DISTWIDTH] IN BLOOD BY AUTOMATED COUNT: 13.7 % (ref 10–15.5)
GFRAA, 66117: 36.1
GFRNA, 66118: 29.8
GLOBULIN,GLOB: 2.8 G/DL (ref 2–4)
GLUCOSE SERPL-MCNC: 113 MG/DL (ref 70–99)
GRANULOCYTES,GRANS: 49 % (ref 40–75)
HCT VFR BLD AUTO: 36.2 % (ref 35.1–48.3)
HGB BLD-MCNC: 11.9 G/DL (ref 11.7–16.1)
INR PPP: 0.86 (ref 0.89–1.29)
LYMPHOCYTES, LYMLT: 42 % (ref 20–45)
MAGNESIUM SERPL-MCNC: 2.2 MG/DL (ref 1.6–2.5)
MCH RBC QN AUTO: 28 PG (ref 26–34)
MCHC RBC AUTO-ENTMCNC: 33 G/DL (ref 31–36)
MCV RBC AUTO: 85 FL (ref 80–95)
MONOCYTES # BLD: 0.3 K/UL (ref 0.1–1)
MONOCYTES NFR BLD: 7 % (ref 3–12)
NEUTROPHILS # BLD AUTO: 2.1 K/UL (ref 1.8–7.7)
PLATELET # BLD AUTO: 217 K/UL (ref 140–440)
PMV BLD AUTO: 10.6 FL (ref 9–13)
POTASSIUM SERPL-SCNC: 5 MMOL/L (ref 3.5–5.5)
PROT SERPL-MCNC: 6.9 G/DL (ref 6.2–8.1)
PROTHROMBIN TIME: 9.4 SEC (ref 9–13)
RAPAMYCIN (SIROLIMUS): 3.8 NG/ML (ref 3–20)
RBC # BLD AUTO: 4.24 M/UL (ref 3.8–5.2)
SODIUM SERPL-SCNC: 143 MMOL/L (ref 133–145)
WBC # BLD AUTO: 4.3 K/UL (ref 4–11)

## 2019-08-12 ENCOUNTER — OFFICE VISIT (OUTPATIENT)
Dept: HEMATOLOGY | Age: 64
End: 2019-08-12

## 2019-08-12 VITALS
TEMPERATURE: 98.7 F | HEART RATE: 74 BPM | DIASTOLIC BLOOD PRESSURE: 47 MMHG | RESPIRATION RATE: 16 BRPM | BODY MASS INDEX: 31.18 KG/M2 | SYSTOLIC BLOOD PRESSURE: 100 MMHG | OXYGEN SATURATION: 99 % | WEIGHT: 194 LBS | HEIGHT: 66 IN

## 2019-08-12 DIAGNOSIS — Z94.4 S/P LIVER TRANSPLANT (HCC): Primary | ICD-10-CM

## 2019-08-12 NOTE — PROGRESS NOTES
[address] [facility], Sheyla NELSON, 30 13[facility], ZAKI Flores, ACNP-BC     April S Aga, [facility]   Elayne Newton FNP-ROZINA Palma, [facility]       Marybel Pryor [facility] 136    at [address], 81 [facility], [facility] 22.    [phone]    FAX: [address] [place] [address] Drive, [address], [address]    [phone]    FAX: [phone]       Patient Care Team:  Kolby Ames MD as PCP - General (Family Practice)  Lazarus Peers, MD as Physician (Surgery)  Osmany Mireles MD as Physician (Obstetrics & Gynecology)  Nannette Wood MD as Physician (Neurosurgery)  Priyanka Shankar DDS as Physician (Dental General Practice)  Ash Contreras O.D. as Physician (Optometry)  Shauna Babb RN as Nurse Navigator  Soto Malik MD (Gastroenterology)  Leann Felix MD (Internal Medicine)  Lexi Little MD (Hematology and Oncology)  Samir Covarrubias DO (Rheumatology)  Margaret Bangura MD (Dermatology)        Problem List  Date Reviewed: 2/28/2019          Codes Class Noted    Long term current use of immunosuppressive drug ICD-10-CM: Z79.899  ICD-9-CM: V58.69  4/18/2018        H/O liver transplant [facility]) ICD-10-CM: Z94.4  ICD-9-CM: V42.7  21/17/0502        Complication of transplanted liver [facility]) ICD-10-CM: T86.40  ICD-9-CM: 996.82  11/13/2013        Back pain, lumbosacral ICD-10-CM: M54.5  ICD-9-CM: 724.2, 724.6  1/27/2013        Diabetes mellitus ([facility] ICD-10-CM: E11.9  ICD-9-CM: 250.00  5/28/2012        Colon polyps ICD-10-CM: K63.5  ICD-9-CM: 211.3  5/28/2012        Breast cancer ([facility] ICD-10-CM: C50.919  ICD-9-CM: 174.9  5/28/2012    Overview Signed 5/28/2012  7:00 AM by Trixie Sosa MD     Masectomy, chemotherapy,XRT. 1996  Tamoxifen [id number]               H/O shoulder surgery ICD-10-CM: Z98.890  ICD-9-CM: V45.89  5/28/2012    Overview Signed 5/28/2012  7:07 AM by Trixie Sosa MD     12/2011                   Tremayne Pino returns to the [address] for management of liver allograft function, to adjust immune suppression. The active problem list, all pertinent past medical history, medications, liver histology, endoscopic studies, radiologic findings and laboratory findings related to the liver disorder were reviewed with the patient. The patient underwent liver transplantation at [place], [place] in 4/2013. The patient is currently receiving sirolimus cellcept and sirolimus for immune suppression. The patient had an acute graft rejection in 10/2017. She has a repeat liver biopsy because of persistent elevation in liver enzymes in 6/2018. This demonstrated steatosis. She had a post-LBX bleed into the liver. This appears to have resolved and the liver enzymes are now down to normal.    The patient developed a severe cellulitis of the right hand which required 2 surgeries in 1/2018 and 2/2018. The hand and forearm continue to improve. The patient has no symptoms which can be attributed to the liver disorder. The patient has not experienced fatigue, fevers, chills. The patient completes all daily activities without any functional limitations. All of the issues listed in the Assessment and Plan were discussed with the patient. All questions were answered. Since the last office appointment the patient has:  Had no changes in the liver disease. Had cone biopsy for cervical CA. Cervical CA removed with DNC in 06/18/2019 by Dr. Hayes Gray. Has followed up with the practice and margins are clean. Next follow up there is in October, 2019.         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 Liver transaminases are normal.  ALP is mildly elevated. Liver function is normal.  The platelet count is normal.     A liver biopsy from 11/2017 demonstrated acute rejection. Rejection was treated with high dose steroids and taper. A liver biopsy in 6/2018 demonstrated fatty liver with no rejection. Continue labs every 4 weeks for now. Immune Suppression  The patient is receiving immune suppression with sirolimus which is being well tolerated at 1 mg/day. The immune suppression blood level is in the proper range at 3.8 in July, 2019. We will continue the Cellcept at 250 mg bid for now. Prednisone has been weaned off since 08/01/2018. NAFL  The most recent liver biopsy demonstrates NAFL. The patient was counseled regarding diet and exercise to achieve weight loss. The best diet for patients with fatty liver is one very low in carbohydrates and enriched with protein such as an Martha's program.    The patient was told not to consume any food or drinks products containing fructose as this enhances hepatic fat synthesis. Acute and chronic kidney injury   This is a common adverse event of immune suppression. The Screat is normal.  Aspirin and NSAIDs should be avoided since these agents can worsen renal insufficiency. Hypercholesterolemia   This can be caused by immune suppression. Serum cholesterol is normal and does not need to be treated at this time. Hypertension   This is a common side effect of immune suppression. Blood pressure is well controlled on the current treatment. Low serum magnesium   This is a common side effect of immune suppression. The patient has a normal or near normal magnesium level and does not need supplementation. Osteoporosis   Osteoporosis is common in patients with cirhrosis prior to liver transplant. The patient had osteoporosis on DEXA scan from 2013 prior to the LT.   The patient still had osteoporosis on DEXA scan from 10/2015. The patient was advised to take calcium supplementation and Vitamin D. The patient should be treated with a bisphosphonate if the bone density does not improve. Now on Prolia since 11/14/2018. Had scan 06/2018. Monitoring for skin Cancer  The patient was counseled regarding increased risk of skin cancer in transplant recipients and need to have any new skin lesions evaluated by dermatology and removed if suspicious. The patient was instructed to see Dermatology annually examination. Vaccinations  Routine vaccinations against other bacterial and viral agents can be performed as long as this is with attentuatted virus. Live virus vaccines should not be administered. Annual flu vaccination should be administered. Has received flu vaccination 09/24/2018. ALLERGIES:  Allergies   Allergen Reactions    Tape [Adhesive] Other (comments)     Pulls skin off    Percocet [Oxycodone-Acetaminophen] Shortness of Breath, Itching and Other (comments)     \"Burning skin\"    Statins-Hmg-Coa Reductase Inhibitors Other (comments)     liver     MEDICATIONS:  Current Outpatient Medications   Medication Sig    lisinopril (PRINIVIL, ZESTRIL) 10 mg tablet Take 10 mg by mouth daily.  mycophenolate (CELLCEPT) 500 mg tablet TAKE 2 TABLETS BY MOUTH TWICE DAILY (Patient taking differently: TAKE 2 TABLETS BY MOUTH TWICE DAILY  Indications: taking differently, 250 mg bid)    denosumab (PROLIA) 60 mg/mL injection 60 mg by SubCUTAneous route.  furosemide (LASIX) 40 mg tablet TAKE ONE TABLET BY MOUTH EVERY DAY    sirolimus (RAPAMUNE) 1 mg tablet Take 4 tabs by mouth daily (Patient taking differently: Take 4 tabs by mouth daily  Indications: taking 2 mg/day)    insulin NPH/insulin regular (NOVOLIN 70/30, HUMULIN 70/30) 100 unit/mL (70-30) injection by SubCUTAneous route.  metoprolol tartrate (LOPRESSOR) 50 mg tablet Take 25 mg by mouth two (2) times a day.     glimepiride (AMARYL) 4 mg tablet Take 4 mg by mouth daily.  pantoprazole (PROTONIX) 40 mg tablet pantoprazole 40 mg tablet,delayed release    ondansetron hcl (ZOFRAN) 4 mg tablet Take 4 mg by mouth every eight (8) hours as needed for Nausea.  amoxicillin (AMOXIL) 500 mg capsule Take 4 capsules by mouth 1 hour prior to dental procedure    cyproheptadine (PERIACTIN) 4 mg tablet Take 1 Tab by mouth three (3) times daily as needed. Indications: Pruritus of Skin     No current facility-administered medications for this visit. SYSTEM REVIEW NOT RELATED TO LIVER DISEASE OR REVIEWED ABOVE:  Constitution systems: Weight gain with steroids. Eyes: Negative for visual changes. ENT: Negative for sore throat, painful swallowing. Respiratory: Negative for cough, hemoptysis, SOB. Cardiology: Negative for chest pain, palpitations. GI:  Negative for constipation or diarrhea. : Negative for urinary frequency, dysuria, hematuria, nocturia. Skin: Negative for rash. Hematology: Negative for easy bruising, blood clots. Musculo-skelatal: Severe cellulitis of right hand has resolved. Neurologic: Negative for headaches, dizziness, vertigo, memory problems not related to HE. Psychology: Negative for anxiety, depression. FAMILY HISTORY:  There is no family history of liver disease. SOCIAL HISTORY:  The patient is . There are 2 children and 2 grandchildren. The patient has never used tobacco products. The patient has never consumed significant amounts of alcohol. The patient used to work for [employer] and then as an  for [employer].  She has not worked since the liver transplant. PHYSICAL EXAMINATION:  Visit Vitals  /47 (BP 1 Location: Right arm, BP Patient Position: Sitting)   Pulse 74   Temp 98.7 °F (37.1 °C) (Tympanic)   Resp 16   Ht 5' 6\" (1.676 m)   Wt 194 lb (88 kg)   SpO2 99%   BMI 31.31 kg/m²       General: Appears healthy. No acute distress. Eyes: Sclera anicteric. ENT: No oral lesions. Thyroid normal.  Nodes: No adenopathy. Skin: No spider angiomata. No jaundice. No palmar erythema. Respiratory: Lungs clear to auscultation. Cardiovascular: Regular heart rate. No murmurs. No JVD. Abdomen:   Well healed liver transplant incision. Soft non-tender. Liver size normal to percussion/palpation. Spleen not palpable. No obvious ascites. Extremities: No lower edema. No muscle wasting. Neurologic:  Alert and oriented. Cranial nerves grossly intact. No asterixsis. LAB STUDIES:  Liver [place] of [address] [place] [address] & Units 7/25/2019 2/21/2019   WBC 4.0 - 11.0 K/uL 4.3 3.6 (L)   ANC 1.8 - 7.7 K/uL 2.1    HGB 11.7 - 16.1 g/dL 11.9 12.3    - 440 K/uL 217 202   INR 0.89 - 1.29 0.86 (L) 0.84 (L)   AST 10 - 37 U/L 38 (H) 38 (H)   ALT 5 - 40 U/L 46 (H) 40   Alk Phos 40 - 120 U/L 85 124 (H)   Bili, Total 0.2 - 1.2 mg/dL 0.1 (L) 0.2   Bili, Direct 0.0 - 0.3 mg/dL <0.2 <0.2   Albumin 3.5 - 5.0 g/dL 4.1 3.8   BUN 6 - 22 mg/dL 30 (H) 26 (H)   Creat 0.8 - 1.4 mg/dL 1.7 (H) 1.5 (H)   Na 133 - 145 mmol/L 143 142   K 3.5 - 5.5 mmol/L 5.0 4.3   Cl 98 - 110 mmol/L 106 105   CO2 20 - 32 mmol/L 25 27   Glucose 70 - 99 mg/dL 113 (H) 131 (H)   Magnesium 1.6 - 2.5 mg/dL 2.2      SEROLOGIES:  2/2012. HAV total negative, HBsAntigen negative, anti-HBcore negative, anti-HBsurface negative, anti-HCV negative, Ferritin 20, NEVA negative, ASMA negative, AMA negative, ceruloplsmin 34, alpha-1-antitrypsin 195, A1AT phenotype MM.  2/2013. Ferritin 434, ASMA weak positive, CMV IgG positive, EBV IgG positive, anti-HIV negative, HBA1C 5.1    LIVER HISTOLOGY:  11/2017. Slides reviewed by MLS. Acute graft rejection. 6/2018. Slides reviewed by MLS. NAFL. 40% macro and micovesicular steatosis. No ballooning. No inflammation. No fibrosis. No rejection. ENDOSCOPIC PROCEDURES:  1/2012. EGD by Dr Christel Day.   Esophageal varices. RADIOLOGY:  1/2012. CT scan abdomen with and without IV contrast.  Changes consistent with cirrhosis. No liver mass lesions. No dilated bile ducts. No bile duct strictures. Varices. Generalized ascites. 07/2012:  Ultrasound of the liver. Echogenic consistent with chronic liver disease, cirrhosis. No liver mass lesions. No ascites  11/2012: Ultrasound of the liver. Echogenic consistent with chronic liver disease, cirrhosis. No liver mass lesions. Small amount ascites present. 1/2013. Ultrasound of liver. Echogenic consistent with cirrhosis. No liver mass lesions. No dilated bile ducts. Mild ascites. 11/2013. Ultrasound of liver. Normal appearing liver. No liver mass lesions. 12/2013:  MRI of abdomen. Questionable small focal stenosis of distal common hepatic duct. Focal stenosis in mid-portal vein. 02/2015. Ultrasound of the liver. Consistent with cirrhosis. 06/2018. Ultrasound of the liver. Mixed echogenicity, complex collection in the medial right hepatic lobe most in keeping with hematoma. 05/2019. Pelvic MRI w/wo contrast.  Enhancing endocervical canal 1.5 cm lesion. OTHER TESTING:.   2/2013. ETOH negative. 8/2013:  DEXA scan - osteoporosis   10/2015. DEXA scan osteoporosis. 8/2018. TFTS. TSH 0.38/T3 free 3.4/T4 free 1.3    45 minutes total time spent with this patient with more than 50% of this time spent counseling and coordinating care as described above. [facility] 87 in 8 weeks. Continue labs every 4 weeks.       DARCIE Isaac  Liver [place] [facility]  [facility], [facility]   [address], [facility]   [phone]

## 2019-08-12 NOTE — PROGRESS NOTES
Yany Shen is a 70 y.o. female      1. Have you been to the ER, urgent care clinic or hospitalized since your last visit? NO.     2. Have you seen or consulted any other health care providers outside of the [address] [place] [address] since your last visit (Include any pap smears or colon screening)? YES    Patient has been to oncology since last visit.            Learning Assessment 7/20/2016   PRIMARY LEARNER Patient   PRIMARY LANGUAGE ENGLISH   LEARNER PREFERENCE PRIMARY DEMONSTRATION   ANSWERED BY self   RELATIONSHIP SELF

## 2019-08-27 LAB
ABSOLUTE LYMPHOCYTE COUNT, 10803: 1.9 K/UL (ref 1–4.8)
BASOPHILS # BLD: 0 K/UL (ref 0–0.2)
BASOPHILS NFR BLD: 1 % (ref 0–2)
EOSINOPHIL # BLD: 0.1 K/UL (ref 0–0.5)
EOSINOPHIL NFR BLD: 1 % (ref 0–6)
ERYTHROCYTE [DISTWIDTH] IN BLOOD BY AUTOMATED COUNT: 13.3 % (ref 10–15.5)
GRANULOCYTES,GRANS: 46 % (ref 40–75)
HCT VFR BLD AUTO: 38.3 % (ref 35.1–48.3)
HGB BLD-MCNC: 12.4 G/DL (ref 11.7–16.1)
INR PPP: 0.87 (ref 0.89–1.29)
LYMPHOCYTES, LYMLT: 44 % (ref 20–45)
MCH RBC QN AUTO: 29 PG (ref 26–34)
MCHC RBC AUTO-ENTMCNC: 32 G/DL (ref 31–36)
MCV RBC AUTO: 89 FL (ref 80–95)
MONOCYTES # BLD: 0.4 K/UL (ref 0.1–1)
MONOCYTES NFR BLD: 8 % (ref 3–12)
NEUTROPHILS # BLD AUTO: 2 K/UL (ref 1.8–7.7)
PLATELET # BLD AUTO: 233 K/UL (ref 140–440)
PMV BLD AUTO: 10.5 FL (ref 9–13)
PROTHROMBIN TIME: 9.4 SEC (ref 9–13)
RAPAMYCIN (SIROLIMUS): 4.6 NG/ML (ref 3–20)
RBC # BLD AUTO: 4.33 M/UL (ref 3.8–5.2)
WBC # BLD AUTO: 4.4 K/UL (ref 4–11)

## 2019-08-28 LAB
A-G RATIO,AGRAT: 1.4 RATIO (ref 1.1–2.6)
ALBUMIN SERPL-MCNC: 4.1 G/DL (ref 3.5–5)
ALP SERPL-CCNC: 80 U/L (ref 40–120)
ALT SERPL-CCNC: 56 U/L (ref 5–40)
ANION GAP SERPL CALC-SCNC: 14 MMOL/L
AST SERPL W P-5'-P-CCNC: 43 U/L (ref 10–37)
BILIRUB SERPL-MCNC: 0.2 MG/DL (ref 0.2–1.2)
BILIRUBIN, DIRECT,CBIL: <0.2 MG/DL (ref 0–0.3)
BUN SERPL-MCNC: 30 MG/DL (ref 6–22)
CALCIUM SERPL-MCNC: 9.7 MG/DL (ref 8.4–10.5)
CHLORIDE SERPL-SCNC: 103 MMOL/L (ref 98–110)
CO2 SERPL-SCNC: 25 MMOL/L (ref 20–32)
CREAT SERPL-MCNC: 1.7 MG/DL (ref 0.8–1.4)
GFRAA, 66117: 36.1
GFRNA, 66118: 29.8
GLOBULIN,GLOB: 3 G/DL (ref 2–4)
GLUCOSE SERPL-MCNC: 119 MG/DL (ref 70–99)
MAGNESIUM SERPL-MCNC: 2.3 MG/DL (ref 1.6–2.5)
POTASSIUM SERPL-SCNC: 5.2 MMOL/L (ref 3.5–5.5)
PROT SERPL-MCNC: 7.1 G/DL (ref 6.2–8.1)
SODIUM SERPL-SCNC: 142 MMOL/L (ref 133–145)

## 2019-09-25 NOTE — PROGRESS NOTES
Received VM from patient inquiring if she needs to have labs repeat in 3 months. Returned call to patient and notified she should continue same lab schedule and repeat in 3 months (4/2017). Faxed new lab order to Alli Baez [address] [place] [address] (f: [phone]). Also mailed copy to patient. [facility]  [address]  [place] [address]  Phone:  [phone]  Fax:  [phone]                                    Lewis Lambert   MRN: [id number]    Department:  [facility]   Date of Visit:  9/25/2019           After Visit Summary Signature Page    I have received my discharge instructions, and my questions have been answered. I have discussed any challenges I see with this plan with the nurse or doctor.    ..........................................................................................................................................  Patient/Patient Representative Signature      ..........................................................................................................................................  Patient Representative Print Name and Relationship to Patient    ..................................................               ................................................  Date                                   Time    ..........................................................................................................................................  Reviewed by Signature/Title    ...................................................              ..............................................  Date                                               Time          22EPIC Rev 08/18

## 2019-09-30 LAB
A-G RATIO,AGRAT: 1.5 RATIO (ref 1.1–2.6)
ABSOLUTE LYMPHOCYTE COUNT, 10803: 2.2 K/UL (ref 1–4.8)
ALBUMIN SERPL-MCNC: 4.3 G/DL (ref 3.5–5)
ALP SERPL-CCNC: 76 U/L (ref 40–120)
ALT SERPL-CCNC: 69 U/L (ref 5–40)
ANION GAP SERPL CALC-SCNC: 16 MMOL/L
AST SERPL W P-5'-P-CCNC: 46 U/L (ref 10–37)
BASOPHILS # BLD: 0 K/UL (ref 0–0.2)
BASOPHILS NFR BLD: 0 % (ref 0–2)
BILIRUB SERPL-MCNC: 0.3 MG/DL (ref 0.2–1.2)
BILIRUBIN, DIRECT,CBIL: <0.2 MG/DL (ref 0–0.3)
BUN SERPL-MCNC: 36 MG/DL (ref 6–22)
CALCIUM SERPL-MCNC: 9.5 MG/DL (ref 8.4–10.5)
CHLORIDE SERPL-SCNC: 100 MMOL/L (ref 98–110)
CO2 SERPL-SCNC: 24 MMOL/L (ref 20–32)
CREAT SERPL-MCNC: 1.9 MG/DL (ref 0.8–1.4)
EOSINOPHIL # BLD: 0.1 K/UL (ref 0–0.5)
EOSINOPHIL NFR BLD: 2 % (ref 0–6)
ERYTHROCYTE [DISTWIDTH] IN BLOOD BY AUTOMATED COUNT: 12.8 % (ref 10–15.5)
GFRAA, 66117: 31.7
GFRNA, 66118: 26.1
GLOBULIN,GLOB: 2.9 G/DL (ref 2–4)
GLUCOSE SERPL-MCNC: 157 MG/DL (ref 70–99)
GRANULOCYTES,GRANS: 45 % (ref 40–75)
HCT VFR BLD AUTO: 40.6 % (ref 35.1–48.3)
HGB BLD-MCNC: 13.2 G/DL (ref 11.7–16.1)
INR PPP: 0.9 (ref 0.89–1.29)
LYMPHOCYTES, LYMLT: 45 % (ref 20–45)
MAGNESIUM SERPL-MCNC: 2.3 MG/DL (ref 1.6–2.5)
MCH RBC QN AUTO: 28 PG (ref 26–34)
MCHC RBC AUTO-ENTMCNC: 33 G/DL (ref 31–36)
MCV RBC AUTO: 86 FL (ref 80–95)
MONOCYTES # BLD: 0.4 K/UL (ref 0.1–1)
MONOCYTES NFR BLD: 8 % (ref 3–12)
NEUTROPHILS # BLD AUTO: 2.2 K/UL (ref 1.8–7.7)
PLATELET # BLD AUTO: 238 K/UL (ref 140–440)
PMV BLD AUTO: 10.6 FL (ref 9–13)
POTASSIUM SERPL-SCNC: 5.2 MMOL/L (ref 3.5–5.5)
PROT SERPL-MCNC: 7.2 G/DL (ref 6.2–8.1)
PROTHROMBIN TIME: 9.6 SEC (ref 9–13)
RAPAMYCIN (SIROLIMUS): 3.8 NG/ML (ref 3–20)
RBC # BLD AUTO: 4.71 M/UL (ref 3.8–5.2)
SODIUM SERPL-SCNC: 140 MMOL/L (ref 133–145)
WBC # BLD AUTO: 4.8 K/UL (ref 4–11)

## 2019-10-15 ENCOUNTER — OFFICE VISIT (OUTPATIENT)
Dept: HEMATOLOGY | Age: 64
End: 2019-10-15

## 2019-10-15 VITALS
BODY MASS INDEX: 31.18 KG/M2 | DIASTOLIC BLOOD PRESSURE: 46 MMHG | OXYGEN SATURATION: 99 % | TEMPERATURE: 98.2 F | HEART RATE: 70 BPM | WEIGHT: 194 LBS | SYSTOLIC BLOOD PRESSURE: 116 MMHG | HEIGHT: 66 IN

## 2019-10-15 DIAGNOSIS — Z94.4 S/P LIVER TRANSPLANT (HCC): Primary | ICD-10-CM

## 2019-10-15 RX ORDER — PHENTERMINE HYDROCHLORIDE 37.5 MG/1
TABLET ORAL
Refills: 0 | COMMUNITY
Start: 2019-09-20 | End: 2020-02-11

## 2019-10-15 NOTE — PROGRESS NOTES
Nida [facility] is a 75 y.o. female      1. Have you been to the ER, urgent care clinic or hospitalized since your last visit? NO.     2. Have you seen or consulted any other health care providers outside of the [address] [place] [address] since your last visit (Include any pap smears or colon screening)?  NO          Learning Assessment 7/20/2016   PRIMARY LEARNER Patient   PRIMARY LANGUAGE ENGLISH   LEARNER PREFERENCE PRIMARY DEMONSTRATION   ANSWERED BY self   RELATIONSHIP SELF

## 2019-10-15 NOTE — PROGRESS NOTES
[address] [place] [address], MD, Arden Bowen MD America Lemons, PA-C Charm Carrier, ACNP-BC     Marilyn Yung, AGPCNP-BC   Celeste Greenberg FNP-C    Miky Goncalves, [facility]       Marybel Pryor Reece De [address] 136    at [address], [facility], [facility] 22.    [phone]    FAX: [address] [place] [address] Drive, [address], [address]    [phone]    FAX: [phone]       Patient Care Team:  Adama Nieves MD as PCP - General (Family Practice)  Aurelio May MD as Physician (Surgery)  Anne Bermeo MD as Physician (Obstetrics & Gynecology)  Patrice Lazar MD as Physician (Neurosurgery)  Ester Mathur DDS as Physician (Dental General Practice)  Amie Almeida O.D. as Physician (Optometry)  Viviana Lopez RN as Nurse Navigator  Krystle Andino MD (Gastroenterology)  Mami Salinas MD (Internal Medicine)  Manjula Ovalle MD (Hematology and Oncology)  Ayse Meyer DO (Rheumatology)  Niya Mccallum MD (Dermatology)        Problem List  Date Reviewed: 10/15/2019          Codes Class Noted    Long term current use of immunosuppressive drug ICD-10-CM: Z79.899  ICD-9-CM: V58.69  4/18/2018        H/O liver transplant [facility]) ICD-10-CM: Z94.4  ICD-9-CM: V42.7  98/13/3240        Complication of transplanted liver [facility]) ICD-10-CM: T86.40  ICD-9-CM: 996.82  11/13/2013        Back pain, lumbosacral ICD-10-CM: M54.5  ICD-9-CM: 724.2, 724.6  1/27/2013        Diabetes mellitus ([facility] ICD-10-CM: E11.9  ICD-9-CM: 250.00  5/28/2012        Colon polyps ICD-10-CM: K63.5  ICD-9-CM: 211.3  5/28/2012        Breast cancer ([facility] ICD-10-CM: C50.919  ICD-9-CM: 174.9  5/28/2012    Overview Signed 5/28/2012  7:00 AM by German Couch MD     Masectomy, chemotherapy,XRT. 1996  Tamoxifen [id number]               H/O shoulder surgery ICD-10-CM: Z98.890  ICD-9-CM: V45.89  5/28/2012    Overview Signed 5/28/2012  7:07 AM by German Couch MD     12/2011                   Stephon Luna returns to the [address] for management of liver allograft function, to adjust immune suppression. The active problem list, all pertinent past medical history, medications, liver histology, endoscopic studies, radiologic findings and laboratory findings related to the liver disorder were reviewed with the patient. The patient underwent liver transplantation at [place], [place] in 4/2013. The patient is currently receiving sirolimus cellcept and sirolimus for immune suppression. The patient had an acute graft rejection in 10/2017. She has a repeat liver biopsy because of persistent elevation in liver enzymes in 6/2018. This demonstrated steatosis. She had a post-LBX bleed into the liver. This appears to have resolved and the liver enzymes are now down to normal.    The patient developed a severe cellulitis of the right hand which required 2 surgeries in 1/2018 and 2/2018. The hand and forearm continue to improve. \"We leave it alone. When it starts to hurt, I don't move it\". The patient has no symptoms which can be attributed to the liver disorder. The patient has not experienced fatigue, fevers, chills. The patient completes all daily activities without any functional limitations. All of the issues listed in the Assessment and Plan were discussed with the patient. All questions were answered. Since the last office appointment the patient has:  Had no changes in the liver disease. Had cone biopsy for cervical CA. Cervical CA removed with DNC in 06/18/2019 by Dr. Hermes Rodriguez. Has followed up with the practice and margins are clean.   Next follow up there is in 01/08/2020. 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 Liver transaminases are normal.  ALP is mildly elevated. Liver function is normal.  The platelet count is normal.     A liver biopsy from 11/2017 demonstrated acute rejection. Rejection was treated with high dose steroids and taper. A liver biopsy in 6/2018 demonstrated fatty liver with no rejection. Continue labs every 4 weeks for now. Immune Suppression  The patient is receiving immune suppression with sirolimus which is being well tolerated at 1 mg/day. The immune suppression blood level is in the proper range at 3.8 in July, 2019. We will continue the Cellcept at 250 mg bid for now. Prednisone has been weaned off since 08/01/2018. NAFL  The most recent liver biopsy demonstrates NAFL. The patient was counseled regarding diet and exercise to achieve weight loss. The best diet for patients with fatty liver is one very low in carbohydrates and enriched with protein such as an Martha's program.    The patient was told not to consume any food or drinks products containing fructose as this enhances hepatic fat synthesis. Acute and chronic kidney injury   This is a common adverse event of immune suppression. The Screat is normal.  Aspirin and NSAIDs should be avoided since these agents can worsen renal insufficiency. Hypercholesterolemia   This can be caused by immune suppression. Serum cholesterol is normal and does not need to be treated at this time. Hypertension   This is a common side effect of immune suppression. Blood pressure is well controlled on the current treatment. Low serum magnesium   This is a common side effect of immune suppression. The patient has a normal or near normal magnesium level and does not need supplementation. Osteoporosis   Osteoporosis is common in patients with cirhrosis prior to liver transplant.   The patient had osteoporosis on DEXA scan from 2013 prior to the LT. The patient still had osteoporosis on DEXA scan from 10/2015. The patient was advised to take calcium supplementation and Vitamin D. The patient should be treated with a bisphosphonate if the bone density does not improve. Now on Prolia since 11/14/2018. Had scan 06/2018. Monitoring for skin Cancer  The patient was counseled regarding increased risk of skin cancer in transplant recipients and need to have any new skin lesions evaluated by dermatology and removed if suspicious. The patient was instructed to see Dermatology annually examination. Vaccinations  Routine vaccinations against other bacterial and viral agents can be performed as long as this is with attentuatted virus. Live virus vaccines should not be administered. Annual flu vaccination should be administered. Has received flu vaccination 09/24/2019. ALLERGIES:  Allergies   Allergen Reactions    Tape [Adhesive] Other (comments)     Pulls skin off    Percocet [Oxycodone-Acetaminophen] Shortness of Breath, Itching and Other (comments)     \"Burning skin\"    Statins-Hmg-Coa Reductase Inhibitors Other (comments)     liver     MEDICATIONS:  Current Outpatient Medications   Medication Sig    lisinopril (PRINIVIL, ZESTRIL) 10 mg tablet Take 10 mg by mouth daily.  mycophenolate (CELLCEPT) 500 mg tablet TAKE 2 TABLETS BY MOUTH TWICE DAILY (Patient taking differently: TAKE 2 TABLETS BY MOUTH TWICE DAILY  Indications: taking differently, 250 mg bid)    denosumab (PROLIA) 60 mg/mL injection 60 mg by SubCUTAneous route.  furosemide (LASIX) 40 mg tablet TAKE ONE TABLET BY MOUTH EVERY DAY    sirolimus (RAPAMUNE) 1 mg tablet Take 4 tabs by mouth daily (Patient taking differently: Take 4 tabs by mouth daily  Indications: taking 2 mg/day)    insulin NPH/insulin regular (NOVOLIN 70/30, HUMULIN 70/30) 100 unit/mL (70-30) injection by SubCUTAneous route.     metoprolol tartrate (LOPRESSOR) 50 mg tablet Take 25 mg by mouth two (2) times a day.  glimepiride (AMARYL) 4 mg tablet Take 4 mg by mouth daily.  pantoprazole (PROTONIX) 40 mg tablet pantoprazole 40 mg tablet,delayed release    ondansetron hcl (ZOFRAN) 4 mg tablet Take 4 mg by mouth every eight (8) hours as needed for Nausea.  amoxicillin (AMOXIL) 500 mg capsule Take 4 capsules by mouth 1 hour prior to dental procedure    cyproheptadine (PERIACTIN) 4 mg tablet Take 1 Tab by mouth three (3) times daily as needed. Indications: Pruritus of Skin     No current facility-administered medications for this visit. SYSTEM REVIEW NOT RELATED TO LIVER DISEASE OR REVIEWED ABOVE:  Constitution systems: Weight gain with steroids. Eyes: Negative for visual changes. ENT: Negative for sore throat, painful swallowing. Respiratory: Negative for cough, hemoptysis, SOB. Cardiology: Negative for chest pain, palpitations. GI:  Negative for constipation or diarrhea. : Negative for urinary frequency, dysuria, hematuria, nocturia. Skin: Negative for rash. Hematology: Negative for easy bruising, blood clots. Musculo-skelatal: Severe cellulitis of right hand has resolved. Neurologic: Negative for headaches, dizziness, vertigo, memory problems not related to HE. Psychology: Negative for anxiety, depression. FAMILY HISTORY:  There is no family history of liver disease. SOCIAL HISTORY:  The patient is . There are 2 children and 2 grandchildren. The patient has never used tobacco products. The patient has never consumed significant amounts of alcohol. The patient used to work for [employer] and then as an  for [employer].  She has not worked since the liver transplant.     PHYSICAL EXAMINATION:  Visit Vitals  /46 (BP 1 Location: Left arm, BP Patient Position: Sitting)   Pulse 70   Temp 98.2 °F (36.8 °C)   Ht 5' 6\" (1.676 m)   Wt 194 lb (88 kg)   SpO2 99% BMI 31.31 kg/m²       General: Appears healthy. No acute distress. Eyes: Sclera anicteric. ENT: No oral lesions. Thyroid normal.  Nodes: No adenopathy. Skin: No spider angiomata. No jaundice. No palmar erythema. Respiratory: Lungs clear to auscultation. Cardiovascular: Regular heart rate. No murmurs. No JVD. Abdomen:   Well healed liver transplant incision. Soft non-tender. Liver size normal to percussion/palpation. Spleen not palpable. No obvious ascites. Extremities: No lower edema. No muscle wasting. Neurologic:  Alert and oriented. Cranial nerves grossly intact. No asterixsis. LAB STUDIES:  [facility]iver [place] of [address] Units 9/30/2019 8/27/2019   WBC 4.0 - 11.0 K/uL 4.8 4.4   ANC 1.8 - 7.7 K/uL 2.2 2.0   HGB 11.7 - 16.1 g/dL 13.2 12.4    - 440 K/uL 238 233   INR 0.89 - 1.29 0.90 0.87 (L)   AST 10 - 37 U/L 46 (H) 43 (H)   ALT 5 - 40 U/L 69 (H) 56 (H)   Alk Phos 40 - 120 U/L 76 80   Bili, Total 0.2 - 1.2 mg/dL 0.3 0.2   Bili, Direct 0.0 - 0.3 mg/dL <0.2 <0.2   Albumin 3.5 - 5.0 g/dL 4.3 4.1   BUN 6 - 22 mg/dL 36 (H) 30 (H)   Creat 0.8 - 1.4 mg/dL 1.9 (H) 1.7 (H)   Na 133 - 145 mmol/L 140 142   K 3.5 - 5.5 mmol/L 5.2 5.2   Cl 98 - 110 mmol/L 100 103   CO2 20 - 32 mmol/L 24 25   Glucose 70 - 99 mg/dL 157 (H) 119 (H)   Magnesium 1.6 - 2.5 mg/dL 2.3 2.3     09/30/2019. Sirolimus: 3.8 ng/mL    SEROLOGIES:  2/2012. HAV total negative, HBsAntigen negative, anti-HBcore negative, anti-HBsurface negative, anti-HCV negative, Ferritin 20, NEVA negative, ASMA negative, AMA negative, ceruloplsmin 34, alpha-1-antitrypsin 195, A1AT phenotype MM.  2/2013. Ferritin 434, ASMA weak positive, CMV IgG positive, EBV IgG positive, anti-HIV negative, HBA1C 5.1    LIVER HISTOLOGY:  11/2017. Slides reviewed by MLS. Acute graft rejection. 6/2018. Slides reviewed by MLS. NAFL. 40% macro and micovesicular steatosis. No ballooning. No inflammation. No fibrosis.   No rejection. ENDOSCOPIC PROCEDURES:  1/2012. EGD by Dr Ector Rosenberg. Esophageal varices. RADIOLOGY:  1/2012. CT scan abdomen with and without IV contrast.  Changes consistent with cirrhosis. No liver mass lesions. No dilated bile ducts. No bile duct strictures. Varices. Generalized ascites. 07/2012:  Ultrasound of the liver. Echogenic consistent with chronic liver disease, cirrhosis. No liver mass lesions. No ascites  11/2012: Ultrasound of the liver. Echogenic consistent with chronic liver disease, cirrhosis. No liver mass lesions. Small amount ascites present. 1/2013. Ultrasound of liver. Echogenic consistent with cirrhosis. No liver mass lesions. No dilated bile ducts. Mild ascites. 11/2013. Ultrasound of liver. Normal appearing liver. No liver mass lesions. 12/2013:  MRI of abdomen. Questionable small focal stenosis of distal common hepatic duct. Focal stenosis in mid-portal vein. 02/2015. Ultrasound of the liver. Consistent with cirrhosis. 06/2018. Ultrasound of the liver. Mixed echogenicity, complex collection in the medial right hepatic lobe most in keeping with hematoma. 05/2019. Pelvic MRI w/wo contrast.  Enhancing endocervical canal 1.5 cm lesion. OTHER TESTING:.   2/2013. ETOH negative. 8/2013:  DEXA scan - osteoporosis   10/2015. DEXA scan osteoporosis. 8/2018. TFTS. TSH 0.38/T3 free 3.4/T4 free 1.3    25 minutes total time spent with this patient with more than 50% of this time spent counseling and coordinating care as described above. [facility] 87 in 2 months. Continue labs every 4 weeks.       DARCIE Saunders  [facility] [place] [facility]  [address], [address]   [facility] Lisa Patten, [address]   [phone]

## 2019-10-25 ENCOUNTER — PATIENT OUTREACH (OUTPATIENT)
Dept: HEMATOLOGY | Age: 64
End: 2019-10-25

## 2019-10-25 NOTE — PROGRESS NOTES
Received VM from patient asking if she can have an epidural steroid injection (SHERRI). Patient reports she has a ruptured disk and this is the treatment that has been recommended. Spoke to Dr. Cooper Au regarding the above. Return phone call placed to patient. Advised patient it it ok for her to have an SHERRI. Patient verbalized understanding. [facility]  [address] [facility] [address]      Conner Riley :1980 MRN:[id number]    10/5/2017 Time Session Began: 1100  Time Session Ended: 1150    Session Type:45 Minute Therapy (43349)    Others Present:     Intervention: Behavioral, Cognitive, Supportive    Suicide/Homicide/Violence Ideation: No    If Yes, explain:     Current Outpatient Prescriptions   Medication Sig   • oxyCODONE/APAP (PERCOCET) 5-325 MG per tablet Take 1 tablet by mouth every 6 hours as needed for Pain.   • ibuprofen (MOTRIN) 600 MG tablet Take 1 tablet by mouth every 8 hours as needed for Pain.   • docusate sodium-sennosides (SENOKOT S) 50-8.6 MG per tablet Take 1 tablet by mouth daily.   • citalopram (CELEXA) 40 MG tablet TAKE 1 TABLET BY MOUTH EVERY MORNING   • buPROPion (WELLBUTRIN XL) 150 MG 24 hr tablet Take 1 tablet by mouth every morning.   • cyclobenzaprine (FLEXERIL) 10 MG tablet Take 1 tablet by mouth nightly.   • traMADol (ULTRAM) 50 MG tablet Take 1 tablet by mouth every 6 hours as needed for Pain.   • clonazePAM (KLONOPIN) 0.5 MG tablet TAKE ONE TABLET BY MOUTH THREE TIMES DAILY   • fluticasone (FLONASE) 50 MCG/ACT nasal spray Spray 1 spray in each nostril 2 times daily.   • omeprazole 20 MG tablet Take 1 tablet by mouth daily.     No current facility-administered medications for this visit.        Change in Medication(s) Reported: No  If Yes, explain:     Patient/Family Education Provided: Yes  Patient/Family Displays Understanding: Yes    If No, explain:     Chief complaint in patient's own words: \"ok.\"    Progress Note containing chief complaint and symptoms/problems related to the complaint:    (Data/Action/Response/Plan)    D:  [place] HEATHER Osvaldo, a 36 year old, White, female attended follow up therapy session. Patient presents as well-nourished, well-developed, alert and oriented to person, place, and time, with congruent mood and affect.  Patient maintained eye contact and was engaged in the session. Patient states the past 2 weeks have been okay. Patient's treatment plan was developed. Patient agreed upon goals. A copy was provided to patient. Patient discussed continued automatic negative thoughts. She does a good job of reframing. However the thoughts induce frustration and at times anger.    A:  Writer offered reflective and active listening skills utilizing a CBT approach to treatment. Writer asked directed and open ended questions to gather information. At times socratic questioning was used to push the patient to analyze her thoughts, feelings and behaviors.  Patient is intelligent and can rationalize her negative thoughts, but needs help coping with how they make her feel. Writer will continue to utilize cognitive behavioral techniques including thought restructuring and challenging cognitive distortions to increase insight and level of functioning.     R:  Patient responded well to reflective and active listening skills. Writer will continue to support the patient utilizing a CBT approach. Overall, patient states she is managing well with her emotions and she finds some relief.    P:  Plan for patient to return in two weeks.    Need for Community Resources Assessed: Yes    Resources Needed: No    If Yes, what resources:     Diagnosis: F41.1 CHAPIN (generalized anxiety disorder)  (primary encounter diagnosis)    Treatment Plan: Treatment Plan established this visit    Discharge Plan: N/A    Next Appointment: two weeks  Roseanna Nunez PSYD

## 2019-11-04 LAB
A-G RATIO,AGRAT: 1.3 RATIO (ref 1.1–2.6)
ABSOLUTE LYMPHOCYTE COUNT, 10803: 2.8 K/UL (ref 1–4.8)
ALBUMIN SERPL-MCNC: 3.9 G/DL (ref 3.5–5)
ALP SERPL-CCNC: 69 U/L (ref 40–120)
ALT SERPL-CCNC: 38 U/L (ref 5–40)
ANION GAP SERPL CALC-SCNC: 16 MMOL/L
APTT PPP: 21 SEC (ref 22–36)
AST SERPL W P-5'-P-CCNC: 30 U/L (ref 10–37)
BASOPHILS # BLD: 0 K/UL (ref 0–0.2)
BASOPHILS NFR BLD: 1 % (ref 0–2)
BILIRUB SERPL-MCNC: 0.2 MG/DL (ref 0.2–1.2)
BILIRUBIN, DIRECT,CBIL: <0.2 MG/DL (ref 0–0.3)
BUN SERPL-MCNC: 28 MG/DL (ref 6–22)
CALCIUM SERPL-MCNC: 9.6 MG/DL (ref 8.4–10.5)
CHLORIDE SERPL-SCNC: 108 MMOL/L (ref 98–110)
CO2 SERPL-SCNC: 21 MMOL/L (ref 20–32)
CREAT SERPL-MCNC: 1.6 MG/DL (ref 0.8–1.4)
EOSINOPHIL # BLD: 0 K/UL (ref 0–0.5)
EOSINOPHIL NFR BLD: 0 % (ref 0–6)
ERYTHROCYTE [DISTWIDTH] IN BLOOD BY AUTOMATED COUNT: 12.6 % (ref 10–15.5)
GFRAA, 66117: 38.3
GFRNA, 66118: 31.6
GLOBULIN,GLOB: 3 G/DL (ref 2–4)
GLUCOSE SERPL-MCNC: 84 MG/DL (ref 70–99)
GRANULOCYTES,GRANS: 48 % (ref 40–75)
HCT VFR BLD AUTO: 38.9 % (ref 35.1–48.3)
HGB BLD-MCNC: 12.6 G/DL (ref 11.7–16.1)
INR PPP: 0.86 (ref 0.89–1.29)
LYMPHOCYTES, LYMLT: 43 % (ref 20–45)
MAGNESIUM SERPL-MCNC: 2.4 MG/DL (ref 1.6–2.5)
MCH RBC QN AUTO: 28 PG (ref 26–34)
MCHC RBC AUTO-ENTMCNC: 32 G/DL (ref 31–36)
MCV RBC AUTO: 88 FL (ref 80–95)
MONOCYTES # BLD: 0.5 K/UL (ref 0.1–1)
MONOCYTES NFR BLD: 8 % (ref 3–12)
NEUTROPHILS # BLD AUTO: 3.1 K/UL (ref 1.8–7.7)
PLATELET # BLD AUTO: 250 K/UL (ref 140–440)
PMV BLD AUTO: 10.4 FL (ref 9–13)
POTASSIUM SERPL-SCNC: 5.8 MMOL/L (ref 3.5–5.5)
PROT SERPL-MCNC: 6.9 G/DL (ref 6.2–8.1)
PROTHROMBIN TIME: 9.3 SEC (ref 9–13)
RAPAMYCIN (SIROLIMUS): 2.7 NG/ML (ref 3–20)
RBC # BLD AUTO: 4.43 M/UL (ref 3.8–5.2)
SODIUM SERPL-SCNC: 145 MMOL/L (ref 133–145)
WBC # BLD AUTO: 6.5 K/UL (ref 4–11)

## 2019-12-04 RX ORDER — FUROSEMIDE 40 MG/1
TABLET ORAL
Qty: 90 TAB | Refills: 3 | Status: SHIPPED | OUTPATIENT
Start: 2019-12-04 | End: 2020-10-19

## 2019-12-16 ENCOUNTER — OFFICE VISIT (OUTPATIENT)
Dept: HEMATOLOGY | Age: 64
End: 2019-12-16

## 2019-12-16 VITALS
OXYGEN SATURATION: 98 % | TEMPERATURE: 98.4 F | HEART RATE: 78 BPM | DIASTOLIC BLOOD PRESSURE: 42 MMHG | HEIGHT: 66 IN | SYSTOLIC BLOOD PRESSURE: 115 MMHG | WEIGHT: 190 LBS | BODY MASS INDEX: 30.53 KG/M2

## 2019-12-16 DIAGNOSIS — Z94.4 S/P LIVER TRANSPLANT (HCC): Primary | ICD-10-CM

## 2019-12-16 RX ORDER — BUTALBITAL, ASPIRIN, AND CAFFEINE 325; 50; 40 MG/1; MG/1; MG/1
CAPSULE ORAL
COMMUNITY
Start: 2019-02-12

## 2019-12-16 RX ORDER — CHOLECALCIFEROL (VITAMIN D3) 125 MCG
1 TABLET ORAL
COMMUNITY
Start: 2019-02-12 | End: 2021-03-15

## 2019-12-16 NOTE — PROGRESS NOTES
[address] [place] [address], MD, Radha Martinez, Marsh Crigler, MD Lucile Aures, PA-C Tye Andrew, [facility]     April S Aga, [facility]   Vivienne Schultz P-C    Bharath Malone, [facility]       Marybel Deputado Reece [address] 136    at [address], [address] [place] [address], [facility] 22.    [phone]    FAX: [address] [place] [address] Drive, [address], [address]    [phone]    FAX: [phone]       Patient Care Team:  Julia Ruff MD as PCP - General (Family Practice)  Dudley Desai MD as Physician (Surgery)  Lacey Meigs, MD as Physician (Obstetrics & Gynecology)  Lesia Khan MD as Physician (Neurosurgery)  Joyce Ramos DDS as Physician (Dental General Practice)  Selam Bustamante O.D. as Physician (Optometry)  Bowen Topete RN as Nurse Navigator  Darryle Beams, MD (Gastroenterology)  Milan Villasenor MD (Internal Medicine)  Marta De Los Santos MD (Hematology and Oncology)  Victoria Crystal DO (Rheumatology)  John Camacho MD (Dermatology)        Problem List  Date Reviewed: 10/15/2019          Codes Class Noted    Long term current use of immunosuppressive drug ICD-10-CM: Z79.899  ICD-9-CM: V58.69  4/18/2018        H/O liver transplant [facility]) ICD-10-CM: Z94.4  ICD-9-CM: V42.7  21/21/0644        Complication of transplanted liver [facility]) ICD-10-CM: T86.40  ICD-9-CM: 996.82  11/13/2013        Back pain, lumbosacral ICD-10-CM: M54.5  ICD-9-CM: 724.2, 724.6  1/27/2013        Diabetes mellitus ([facility] ICD-10-CM: E11.9  ICD-9-CM: 250.00  5/28/2012        Colon polyps ICD-10-CM: K63.5  ICD-9-CM: 211.3  5/28/2012        [facility] cancer ([facility] ICD-10-CM: C50.919  ICD-9-CM: 174.9  5/28/2012    Overview Signed 5/28/2012  7:00 AM by Pedro Hudson MD     Masectomy, chemotherapy,XRT. 1996  Tamoxifen [id number]               H/O shoulder surgery ICD-10-CM: Z98.890  ICD-9-CM: V45.89  5/28/2012    Overview Signed 5/28/2012  7:07 AM by Pedro Hudson MD     12/2011                   Dyllan Graft returns to the [address] for management of liver allograft function, to adjust immune suppression. The active problem list, all pertinent past medical history, medications, liver histology, endoscopic studies, radiologic findings and laboratory findings related to the liver disorder were reviewed with the patient. The patient underwent liver transplantation at [place], [place] in 4/2013. The patient is currently receiving sirolimus cellcept and sirolimus for immune suppression. The patient had an acute graft rejection in 10/2017. She has a repeat liver biopsy because of persistent elevation in liver enzymes in 6/2018. This demonstrated steatosis. She had a post-LBX bleed into the liver. This appears to have resolved and the liver enzymes are now down to normal.    The patient developed a severe cellulitis of the right hand which required 2 surgeries in 1/2018 and 2/2018. The hand and forearm continue to improve. \"We leave it alone. When it starts to hurt, I don't move it\". Had cone biopsy for cervical CA. Cervical CA removed with DNC in 06/18/2019 by Dr. Keisha Corral. She has followed up with the practice and margins are clean. Next follow up there is in 01/08/2020. The patient has no symptoms which can be attributed to the liver disorder. The patient has not experienced fatigue, fevers, chills. The patient completes all daily activities without any functional limitations. All of the issues listed in the Assessment and Plan were discussed with the patient. All questions were answered.       Since the last office appointment the patient has:  Had no changes in the liver disease. 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 Liver transaminases are normal.  ALP is mildly elevated. Liver function is normal.  The platelet count is normal.     A liver biopsy from 11/2017 demonstrated acute rejection. Rejection was treated with high dose steroids and taper. A liver biopsy in 6/2018 demonstrated fatty liver with no rejection. Continue labs every 4 weeks for now. Immune Suppression  The patient is receiving immune suppression with sirolimus which is being well tolerated at 1 mg/day. The immune suppression blood level is in the proper range at 3.8 in July, 2019. We will continue the Cellcept at 250 mg bid for now. Prednisone has been weaned off since 08/01/2018. NAFL  The most recent liver biopsy demonstrates NAFL. The patient was counseled regarding diet and exercise to achieve weight loss. The best diet for patients with fatty liver is one very low in carbohydrates and enriched with protein such as an Martha[employer] program.    The patient was told not to consume any food or drinks products containing fructose as this enhances hepatic fat synthesis. Ultrasound ordered today to be performed prior to her next appointment here in 2 months. Acute and chronic kidney injury   This is a common adverse event of immune suppression. The Screat is normal.  Aspirin and NSAIDs should be avoided since these agents can worsen renal insufficiency. Hypercholesterolemia   This can be caused by immune suppression. Serum cholesterol is normal and does not need to be treated at this time. Hypertension   This is a common side effect of immune suppression. Blood pressure is well controlled on the current treatment. Low serum magnesium   This is a common side effect of immune suppression. The patient has a normal or near normal magnesium level and does not need supplementation.     Osteoporosis   Osteoporosis is common in patients with cirhrosis prior to liver transplant. The patient had osteoporosis on DEXA scan from 2013 prior to the LT. The patient still had osteoporosis on DEXA scan from 10/2015. The patient was advised to take calcium supplementation and Vitamin D. The patient should be treated with a bisphosphonate if the bone density does not improve. Now on Prolia since 11/14/2018. Had scan 06/2018. Monitoring for skin Cancer  The patient was counseled regarding increased risk of skin cancer in transplant recipients and need to have any new skin lesions evaluated by dermatology and removed if suspicious. The patient was instructed to see Dermatology annually examination. Vaccinations  Routine vaccinations against other bacterial and viral agents can be performed as long as this is with attentuatted virus. Live virus vaccines should not be administered. Annual flu vaccination should be administered. Has received flu vaccination 09/24/2019. ALLERGIES:  Allergies   Allergen Reactions    Tape [Adhesive] Other (comments)     Pulls skin off    Percocet [Oxycodone-Acetaminophen] Shortness of Breath, Itching and Other (comments)     \"Burning skin\"    Statins-Hmg-Coa Reductase Inhibitors Other (comments)     liver     MEDICATIONS:  Current Outpatient Medications   Medication Sig    butalbital-aspirin-caffeine (FIORINAL) capsule butalbital-aspirin-caffeine 50 mg-325 mg-40 mg capsule    ergocalciferol, vitamin D2, 2,000 unit tab Take 1 Tab by mouth.  furosemide (LASIX) 40 mg tablet TAKE 1 TABLET BY MOUTH ONCE DAILY    lisinopril (PRINIVIL, ZESTRIL) 10 mg tablet Take 10 mg by mouth daily.  mycophenolate (CELLCEPT) 500 mg tablet TAKE 2 TABLETS BY MOUTH TWICE DAILY (Patient taking differently: TAKE 2 TABLETS BY MOUTH TWICE DAILY  Indications: taking differently, 250 mg bid)    denosumab (PROLIA) 60 mg/mL injection 60 mg by SubCUTAneous route.     sirolimus (RAPAMUNE) 1 mg tablet Take 4 tabs by mouth daily (Patient taking differently: Take 4 tabs by mouth daily  Indications: taking 2 mg/day)    insulin NPH/insulin regular (NOVOLIN 70/30, HUMULIN 70/30) 100 unit/mL (70-30) injection by SubCUTAneous route.  metoprolol tartrate (LOPRESSOR) 50 mg tablet Take 25 mg by mouth two (2) times a day.  glimepiride (AMARYL) 4 mg tablet Take 4 mg by mouth daily.  phentermine (ADIPEX-P) 37.5 mg tablet TAKE 1 TABLET BY MOUTH ONCE DAILY    pantoprazole (PROTONIX) 40 mg tablet pantoprazole 40 mg tablet,delayed release    ondansetron hcl (ZOFRAN) 4 mg tablet Take 4 mg by mouth every eight (8) hours as needed for Nausea.  cyproheptadine (PERIACTIN) 4 mg tablet Take 1 Tab by mouth three (3) times daily as needed. Indications: Pruritus of Skin     No current facility-administered medications for this visit. SYSTEM REVIEW NOT RELATED TO LIVER DISEASE OR REVIEWED ABOVE:  Constitution systems: Weight gain with steroids. Eyes: Negative for visual changes. ENT: Negative for sore throat, painful swallowing. Respiratory: Negative for cough, hemoptysis, SOB. Cardiology: Negative for chest pain, palpitations. GI:  Negative for constipation or diarrhea. : Negative for urinary frequency, dysuria, hematuria, nocturia. Skin: Negative for rash. Hematology: Negative for easy bruising, blood clots. Musculo-skelatal: Negative for muscle pain or weakness. Neurologic: Negative for headaches, dizziness, vertigo, memory problems not related to HE. Psychology: Negative for anxiety, depression. FAMILY HISTORY:  There is no family history of liver disease. SOCIAL HISTORY:  The patient is . There are 2 children and 2 grandchildren. The patient has never used tobacco products. The patient has never consumed significant amounts of alcohol.     The patient used to work for [employer] and then as an  for [employer].  She has not worked since the liver transplant. PHYSICAL EXAMINATION:  Visit Vitals  /42   Pulse 78   Temp 98.4 °F (36.9 °C) (Tympanic)   Ht 5' 6\" (1.676 m)   Wt 190 lb (86.2 kg)   SpO2 98%   BMI 30.67 kg/m²       General: Appears healthy. No acute distress. Eyes: Sclera anicteric. ENT: No oral lesions. Thyroid normal.  Nodes: No adenopathy. Skin: No spider angiomata. No jaundice. No palmar erythema. Respiratory: Lungs clear to auscultation. Cardiovascular: Regular heart rate. No murmurs. No JVD. Abdomen:   Well healed liver transplant incision. Soft non-tender. Liver size normal to percussion/palpation. Spleen not palpable. No obvious ascites. Extremities: No lower edema. No muscle wasting. Neurologic:  Alert and oriented. Cranial nerves grossly intact. No asterixsis. LAB STUDIES:  [facility] [place] of [address] Units 11/4/2019 9/30/2019   WBC 4.0 - 11.0 K/uL 6.5 4.8   ANC 1.8 - 7.7 K/uL 3.1 2.2   HGB 11.7 - 16.1 g/dL 12.6 13.2    - 440 K/uL 250 238   INR 0.89 - 1.29 0.86 (L) 0.90   AST 10 - 37 U/L 30 46 (H)   ALT 5 - 40 U/L 38 69 (H)   Alk Phos 40 - 120 U/L 69 76   Bili, Total 0.2 - 1.2 mg/dL 0.2 0.3   Bili, Direct 0.0 - 0.3 mg/dL <0.2 <0.2   Albumin 3.5 - 5.0 g/dL 3.9 4.3   BUN 6 - 22 mg/dL 28 (H) 36 (H)   Creat 0.8 - 1.4 mg/dL 1.6 (H) 1.9 (H)   Na 133 - 145 mmol/L 145 140   K 3.5 - 5.5 mmol/L 5.8 (H) 5.2   Cl 98 - 110 mmol/L 108 100   CO2 20 - 32 mmol/L 21 24   Glucose 70 - 99 mg/dL 84 157 (H)   Magnesium 1.6 - 2.5 mg/dL 2.4 2.3     11/04/2019. Sirolimus: 2.7 ng/mL  09/30/2019. Sirolimus: 3.8 ng/mL    SEROLOGIES:  2/2012. HAV total negative, HBsAntigen negative, anti-HBcore negative, anti-HBsurface negative, anti-HCV negative, Ferritin 20, NEVA negative, ASMA negative, AMA negative, ceruloplsmin 34, alpha-1-antitrypsin 195, A1AT phenotype MM.  2/2013.   Ferritin 434, ASMA weak positive, CMV IgG positive, EBV IgG positive, anti-HIV negative, HBA1C 5.1    LIVER HISTOLOGY:  11/2017. Slides reviewed by MLS. Acute graft rejection. 6/2018. Slides reviewed by MLS. NAFL. 40% macro and micovesicular steatosis. No ballooning. No inflammation. No fibrosis. No rejection. ENDOSCOPIC PROCEDURES:  1/2012. EGD by Dr Skye Goldberg. Esophageal varices. RADIOLOGY:  1/2012. CT scan abdomen with and without IV contrast.  Changes consistent with cirrhosis. No liver mass lesions. No dilated bile ducts. No bile duct strictures. Varices. Generalized ascites. 07/2012:  Ultrasound of the liver. Echogenic consistent with chronic liver disease, cirrhosis. No liver mass lesions. No ascites  11/2012: Ultrasound of the liver. Echogenic consistent with chronic liver disease, cirrhosis. No liver mass lesions. Small amount ascites present. 1/2013. Ultrasound of liver. Echogenic consistent with cirrhosis. No liver mass lesions. No dilated bile ducts. Mild ascites. 11/2013. Ultrasound of liver. Normal appearing liver. No liver mass lesions. 12/2013:  MRI of abdomen. Questionable small focal stenosis of distal common hepatic duct. Focal stenosis in mid-portal vein. 02/2015. Ultrasound of the liver. Consistent with cirrhosis. 06/2018. Ultrasound of the liver. Mixed echogenicity, complex collection in the medial right hepatic lobe most in keeping with hematoma. 05/2019. Pelvic MRI w/wo contrast.  Enhancing endocervical canal 1.5 cm lesion. OTHER TESTING:.   2/2013. ETOH negative. 8/2013:  DEXA scan - osteoporosis   10/2015. DEXA scan osteoporosis. 8/2018. TFTS. TSH 0.38/T3 free 3.4/T4 free 1.3    25 minutes total time spent with this patient with more than 50% of this time spent counseling and coordinating care as described above. [address] [address] in 2 months. Continue labs every 4 weeks.       DARCIE Lockhart  Liver [place] of [address], [place] [address]., [facility] [address]   [phone]

## 2020-01-07 ENCOUNTER — HOSPITAL ENCOUNTER (OUTPATIENT)
Dept: LAB | Age: 65
Discharge: HOME | End: 2020-01-07
Payer: MEDICARE

## 2020-01-07 PROCEDURE — 88175 CYTOPATH C/V AUTO FLUID REDO: CPT

## 2020-02-05 LAB
ABSOLUTE LYMPHOCYTE COUNT, 10803: 2.7 K/UL (ref 1–4.8)
AVG GLU, 10930: 160 MG/DL (ref 91–123)
BASOPHILS # BLD: 0 K/UL (ref 0–0.2)
BASOPHILS NFR BLD: 1 % (ref 0–2)
EOSINOPHIL # BLD: 0.1 K/UL (ref 0–0.5)
EOSINOPHIL NFR BLD: 1 % (ref 0–6)
ERYTHROCYTE [DISTWIDTH] IN BLOOD BY AUTOMATED COUNT: 12.9 % (ref 10–15.5)
GRANULOCYTES,GRANS: 43 % (ref 40–75)
HBA1C MFR BLD HPLC: 7.2 % (ref 4.8–5.6)
HCT VFR BLD AUTO: 36.5 % (ref 35.1–48.3)
HGB BLD-MCNC: 11.8 G/DL (ref 11.7–16.1)
IMMATURE PLATELET FRACTION: ABNORMAL
INR PPP: 0.86 (ref 0.89–1.29)
LYMPHOCYTES, LYMLT: 48 % (ref 20–45)
MCH RBC QN AUTO: 30 PG (ref 26–34)
MCHC RBC AUTO-ENTMCNC: 32 G/DL (ref 31–36)
MCV RBC AUTO: 93 FL (ref 81–99)
MONOCYTES # BLD: 0.4 K/UL (ref 0.1–1)
MONOCYTES NFR BLD: 8 % (ref 3–12)
NEUTROPHILS # BLD AUTO: 2.4 K/UL (ref 1.8–7.7)
PLATELET # BLD AUTO: ABNORMAL 10*3/UL
PROTHROMBIN TIME: 9.3 SEC (ref 9–13)
RAPAMYCIN (SIROLIMUS): 3.8 NG/ML (ref 3–20)
RBC # BLD AUTO: 3.93 M/UL (ref 3.8–5.2)
SMEAR EVAL, 1131: NORMAL
WBC # BLD AUTO: 5.6 K/UL (ref 4–11)

## 2020-02-11 ENCOUNTER — OFFICE VISIT (OUTPATIENT)
Dept: HEMATOLOGY | Age: 65
End: 2020-02-11

## 2020-02-11 VITALS
DIASTOLIC BLOOD PRESSURE: 49 MMHG | OXYGEN SATURATION: 98 % | HEART RATE: 90 BPM | WEIGHT: 195 LBS | HEIGHT: 66 IN | BODY MASS INDEX: 31.34 KG/M2 | SYSTOLIC BLOOD PRESSURE: 135 MMHG | RESPIRATION RATE: 16 BRPM | TEMPERATURE: 97.6 F

## 2020-02-11 DIAGNOSIS — Z94.4 S/P LIVER TRANSPLANT (HCC): Primary | ICD-10-CM

## 2020-02-11 RX ORDER — ONDANSETRON 4 MG/1
4 TABLET, FILM COATED ORAL
Qty: 60 TAB | Refills: 3 | Status: SHIPPED | OUTPATIENT
Start: 2020-02-11 | End: 2020-02-26

## 2020-02-11 NOTE — PROGRESS NOTES
Identified pt with two pt identifiers(name and ). Reviewed record in preparation for visit and have obtained necessary documentation. Chief Complaint   Patient presents with    Follow-up        Health Maintenance Due   Topic    Hepatitis C Screening     Foot Exam Q1     MICROALBUMIN Q1     Eye Exam Retinal or Dilated     DTaP/Tdap/Td series (1 - Tdap)    Shingrix Vaccine Age 50> (1 of 2)    FOBT Q1Y Age 54-65     Breast Cancer Screen Mammogram     Medicare Yearly Exam     GLAUCOMA SCREENING Q2Y     Bone Densitometry (Dexa) Screening     Pneumococcal 65+ years (2 of 2 - PPSV23)       Coordination of Care Questionnaire:  :   1) Have you been to an emergency room, urgent care, or hospitalized since your last visit? If yes, where when, and reason for visit? no       2. Have seen or consulted any other health care provider since your last visit? If yes, where when, and reason for visit? NO      3) Do you have an Advanced Directive/ Living Will in place? YES  If yes, do we have a copy on file YES        Learning Assessment 2016   PRIMARY LEARNER Patient   PRIMARY LANGUAGE ENGLISH   LEARNER PREFERENCE PRIMARY DEMONSTRATION   ANSWERED BY self   RELATIONSHIP SELF        3 most recent PHQ Screens 2020   Little interest or pleasure in doing things Not at all   Feeling down, depressed, irritable, or hopeless Not at all   Total Score PHQ 2 0            Fall Risk Assessment, last 12 mths 2020   Able to walk? Yes   Fall in past 12 months?  No   Fall with injury? -   Number of falls in past 12 months -   Fall Risk Score -

## 2020-02-24 ENCOUNTER — TELEPHONE (OUTPATIENT)
Dept: HEMATOLOGY | Age: 65
End: 2020-02-24

## 2020-02-24 NOTE — TELEPHONE ENCOUNTER
Patient would like for you to resend her Zofran the dissolving ones to her pharmacy. She stated that you sent the wrong kind.

## 2020-02-26 RX ORDER — ONDANSETRON 4 MG/1
4 TABLET, ORALLY DISINTEGRATING ORAL
Qty: 90 TAB | Refills: 2 | Status: SHIPPED | OUTPATIENT
Start: 2020-02-26

## 2020-06-29 ENCOUNTER — OFFICE VISIT (OUTPATIENT)
Dept: HEMATOLOGY | Age: 65
End: 2020-06-29

## 2020-06-29 VITALS
HEART RATE: 98 BPM | HEIGHT: 66 IN | TEMPERATURE: 97.8 F | DIASTOLIC BLOOD PRESSURE: 40 MMHG | SYSTOLIC BLOOD PRESSURE: 122 MMHG | OXYGEN SATURATION: 98 % | BODY MASS INDEX: 30.7 KG/M2 | WEIGHT: 191 LBS | RESPIRATION RATE: 18 BRPM

## 2020-06-29 DIAGNOSIS — Z94.4 S/P LIVER TRANSPLANT (HCC): Primary | ICD-10-CM

## 2020-06-29 NOTE — PROGRESS NOTES
Identified pt with two pt identifiers(name and ). Reviewed record in preparation for visit and have obtained necessary documentation. Chief Complaint   Patient presents with    Follow-up        Health Maintenance Due   Topic    Hepatitis C Screening     Foot Exam Q1     MICROALBUMIN Q1     Eye Exam Retinal or Dilated     DTaP/Tdap/Td series (1 - Tdap)    Shingrix Vaccine Age 50> (1 of 2)    FOBT Q1Y Age 54-65     Breast Cancer Screen Mammogram     Medicare Yearly Exam     GLAUCOMA SCREENING Q2Y     Bone Densitometry (Dexa) Screening     Pneumococcal 65+ years (2 of 2 - PPSV23)       Coordination of Care Questionnaire:  :   1) Have you been to an emergency room, urgent care, or hospitalized since your last visit? If yes, where when, and reason for visit? no       2. Have seen or consulted any other health care provider since your last visit? If yes, where when, and reason for visit? NO      3) Do you have an Advanced Directive/ Living Will in place? YES  If yes, do we have a copy on file YES  If no, would you like information NO      Learning Assessment 2016   PRIMARY LEARNER Patient   PRIMARY LANGUAGE ENGLISH   LEARNER PREFERENCE PRIMARY DEMONSTRATION   ANSWERED BY self   RELATIONSHIP SELF        3 most recent PHQ Screens 2020   Little interest or pleasure in doing things Not at all   Feeling down, depressed, irritable, or hopeless Not at all   Total Score PHQ 2 0        Abuse Screening Questionnaire 2020   Do you ever feel afraid of your partner? N   Are you in a relationship with someone who physically or mentally threatens you? N   Is it safe for you to go home? Y        Fall Risk Assessment, last 12 mths 2020   Able to walk? Yes   Fall in past 12 months?  No   Fall with injury? -   Number of falls in past 12 months -   Fall Risk Score -

## 2020-06-29 NOTE — PROGRESS NOTES
[address] [place] [address], Cherelle NELSON Danelle Sabot, MD Fortino Rosa, PA-C Velton Sierras, ACNP-BC     Marilyn Yung, [facility]   Ankita Lopez FNP-ROZINA Erickson, [facility]       Marybel Deputado Reece De [address]    at [address], [address] [place] [address], [place] [address].    [phone]    FAX: [address] [place] [address]    at [address], [address], [address]    [phone]    FAX: [phone]       Patient Care Team:  Josephine Ron MD as PCP - General (Family Practice)  Ok Alba MD as Physician (Surgery)  Milagros Ibrahim MD as Physician (Obstetrics & Gynecology)  Celina Espinoza MD as Physician (Neurosurgery)  Yesika Madrid DDS as Physician (Dental General Practice)  Nitza Fischer O.D. as Physician (Optometry)  Caleb Glass RN as Nurse Navigator  Maricruz Fuller MD (Gastroenterology)  Roosevelt Cuevas MD (Internal Medicine)  Daiana Le MD (Hematology and Oncology)  Coty Sargent DO (Rheumatology)  Sebastián Howard MD (Dermatology)        Problem List  Date Reviewed: 10/15/2019          Codes Class Noted    Long term current use of immunosuppressive drug ICD-10-CM: Z79.899  ICD-9-CM: V58.69  4/18/2018        H/O liver transplant [facility]) ICD-10-CM: Z94.4  ICD-9-CM: V42.7  48/08/8827        Complication of transplanted liver [facility]) ICD-10-CM: T86.40  ICD-9-CM: 996.82  11/13/2013        Back pain, lumbosacral ICD-10-CM: M54.5  ICD-9-CM: 724.2, 724.6  1/27/2013        Diabetes mellitus ([facility] ICD-10-CM: E11.9  ICD-9-CM: 250.00  5/28/2012        Colon polyps ICD-10-CM: K63.5  ICD-9-CM: 211.3  5/28/2012        Breast cancer ([facility] ICD-10-CM: C50.919  ICD-9-CM: 174.9  5/28/2012    Overview Signed 5/28/2012  7:00 AM by Osman Hall MD     Masectomy, chemotherapy,XRT. 1996  Tamoxifen [id number]               H/O shoulder surgery ICD-10-CM: Z98.890  ICD-9-CM: V45.89  5/28/2012    Overview Signed 5/28/2012  7:07 AM by Osman Hall MD     12/2011                   Paola Cowan returns to the [address] for management of liver allograft function, to adjust immune suppression. The active problem list, all pertinent past medical history, medications, liver histology, endoscopic studies, radiologic findings and laboratory findings related to the liver disorder were reviewed with the patient. The patient underwent liver transplantation at [place], [place] in 4/2013. The patient is currently receiving sirolimus cellcept and sirolimus for immune suppression. The patient had an acute graft rejection in 10/2017. She has a repeat liver biopsy because of persistent elevation in liver enzymes in 6/2018. This demonstrated steatosis. She had a post-LBX bleed into the liver. This appears to have resolved and the liver enzymes are now down to normal.    Had cone biopsy for cervical CA. Cervical CA removed with DNC in 06/18/2019 by Dr. Naya Guerrier. She has followed up with the practice and margins are clean. The patient has no symptoms which can be attributed to the liver disorder. The patient has not experienced fatigue, fevers, chills. The patient completes all daily activities without any functional limitations. All of the issues listed in the Assessment and Plan were discussed with the patient. All questions were answered. Since the last office appointment the patient has:  Had no changes in the liver disease. Has not gained any weight. 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   The most recent laboratory studies indicate that the liver transaminases are normal, alkaline phosphatase is normal, tests of hepatic synthetic and metabolic function are normal, and the platelet count is normal.    A liver biopsy from 11/2017 demonstrated acute rejection. Rejection was treated with high dose steroids and taper. A liver biopsy in 6/2018 demonstrated fatty liver with no rejection. Continue labs every 4 weeks for now. Immune Suppression  The patient is receiving immune suppression with sirolimus which is being well tolerated at 1 mg/day. The immune suppression blood level is in the proper range at 4.5 on 06/19/2020. We will continue the Cellcept at 250 mg bid for now. Prednisone has been weaned off since 08/01/2018. NAFL  The most recent liver biopsy demonstrates NAFL. The patient was counseled regarding diet and exercise to achieve weight loss. The best diet for patients with fatty liver is one very low in carbohydrates and enriched with protein such as an [employer] program.    The patient was told not to consume any food or drinks products containing fructose as this enhances hepatic fat synthesis. Ultrasound ordered today to be performed prior to her next appointment here in 2 months. Acute and chronic kidney injury   This is a common adverse event of immune suppression. The Screat is normal.  Aspirin and NSAIDs should be avoided since these agents can worsen renal insufficiency. Hypercholesterolemia   This can be caused by immune suppression. Serum cholesterol is normal and does not need to be treated at this time. Hypertension   This is a common side effect of immune suppression. Blood pressure is well controlled on the current treatment. Low serum magnesium   This is a common side effect of immune suppression. The patient has a normal or near normal magnesium level and does not need supplementation. Osteoporosis   Osteoporosis is common in patients with cirhrosis prior to liver transplant.   The patient had osteoporosis on DEXA scan from 2013 prior to the LT.  The patient still had osteoporosis on DEXA scan from 10/2015. The patient was advised to take calcium supplementation and Vitamin D. The patient should be treated with a bisphosphonate if the bone density does not improve. Now on Prolia since 11/14/2018. Had scan 06/2018. Monitoring for skin Cancer  The patient was counseled regarding increased risk of skin cancer in transplant recipients and need to have any new skin lesions evaluated by dermatology and removed if suspicious. The patient was instructed to see Dermatology annually examination. Vaccinations  Routine vaccinations against other bacterial and viral agents can be performed as long as this is with attentuatted virus. Live virus vaccines should not be administered. Annual flu vaccination should be administered. Has received flu vaccination 09/24/2019. ALLERGIES:  Allergies   Allergen Reactions    Tape [Adhesive] Other (comments)     Pulls skin off    Percocet [Oxycodone-Acetaminophen] Shortness of Breath, Itching and Other (comments)     \"Burning skin\"    Statins-Hmg-Coa Reductase Inhibitors Other (comments)     liver     MEDICATIONS:  Current Outpatient Medications   Medication Sig    ondansetron (ZOFRAN ODT) 4 mg disintegrating tablet Take 1 Tab by mouth every three to four (3-4) hours as needed for Nausea or Vomiting.  butalbital-aspirin-caffeine (FIORINAL) capsule butalbital-aspirin-caffeine 50 mg-325 mg-40 mg capsule    ergocalciferol, vitamin D2, 2,000 unit tab Take 1 Tab by mouth.  furosemide (LASIX) 40 mg tablet TAKE 1 TABLET BY MOUTH ONCE DAILY    lisinopril (PRINIVIL, ZESTRIL) 10 mg tablet Take 10 mg by mouth daily.     mycophenolate (CELLCEPT) 500 mg tablet TAKE 2 TABLETS BY MOUTH TWICE DAILY (Patient taking differently: TAKE 2 TABLETS BY MOUTH TWICE DAILY  Indications: taking differently, 250 mg bid)    pantoprazole (PROTONIX) 40 mg tablet pantoprazole 40 mg tablet,delayed release    denosumab (PROLIA) 60 mg/mL injection 60 mg by SubCUTAneous route.  sirolimus (RAPAMUNE) 1 mg tablet Take 4 tabs by mouth daily (Patient taking differently: Take 4 tabs by mouth daily  Indications: taking 2 mg/day)    insulin NPH/insulin regular (NOVOLIN 70/30, HUMULIN 70/30) 100 unit/mL (70-30) injection by SubCUTAneous route.  metoprolol tartrate (LOPRESSOR) 50 mg tablet Take 25 mg by mouth two (2) times a day.  glimepiride (AMARYL) 4 mg tablet Take 4 mg by mouth daily.  cyproheptadine (PERIACTIN) 4 mg tablet Take 1 Tab by mouth three (3) times daily as needed. Indications: Pruritus of Skin     No current facility-administered medications for this visit. SYSTEM REVIEW NOT RELATED TO LIVER DISEASE OR REVIEWED ABOVE:  Constitution systems: Weight gain with steroids. Eyes: Negative for visual changes. ENT: Negative for sore throat, painful swallowing. Respiratory: Negative for cough, hemoptysis, SOB. Cardiology: Negative for chest pain, palpitations. GI:  Negative for constipation or diarrhea. : Negative for urinary frequency, dysuria, hematuria, nocturia. Skin: Negative for rash. Hematology: Negative for easy bruising, blood clots. Musculo-skelatal: Negative for muscle pain or weakness. Neurologic: Negative for headaches, dizziness, vertigo, memory problems not related to HE. Psychology: Negative for anxiety, depression. FAMILY HISTORY:  There is no family history of liver disease. SOCIAL HISTORY:  The patient is . There are 2 children and 2 grandchildren. The patient has never used tobacco products. The patient has never consumed significant amounts of alcohol. The patient used to work for "[employer]" and then as an  for [employer].  She has not worked since the liver transplant.     PHYSICAL EXAMINATION:  Visit Vitals  /40 (BP 1 Location: Left arm, BP Patient Position: Sitting)   Pulse 98   Temp 97.8 °F (36.6 °C) (Oral) Resp 18   Ht 5' 6\" (1.676 m)   Wt 191 lb (86.6 kg)   SpO2 98%   BMI 30.83 kg/m²       General: Appears healthy. No acute distress. Eyes: Sclera anicteric. ENT: No oral lesions. Thyroid normal.  Nodes: No adenopathy. Skin: No spider angiomata. No jaundice. No palmar erythema. Respiratory: Lungs clear to auscultation. Cardiovascular: Regular heart rate. No murmurs. No JVD. Abdomen:   Well healed liver transplant incision. Soft non-tender. Liver size normal to percussion/palpation. Spleen not palpable. No obvious ascites. Extremities: No lower edema. No muscle wasting. Neurologic:  Alert and oriented. Cranial nerves grossly intact. No asterixsis. LAB STUDIES:  From 04/2020 [facility]):  AST/ ALT/ ALP/ T. BILI/ ALB: 57/ 65/ 79/ 0.3/ 3.7  BUN/ CRT: 25/ 2.0  RAPA: 5.2    From 05/2020 [facility]):  AST/ ALT/ ALP/ T. BILI/ ALB: 41/ 50/ 75/ 0.4/ 3.9  BUN/ CRT: 25/ 1.8  RAPA: 4.7    From 06/2020 [facility]):  AST/ ALT/ ALP/ T. BILI/ ALB: 74/ 75/ 76/ 0.3/ 4.1  BUN/ CRT:25/ 1.6  RAPA: 4.5    Liver [place] of [address] Units 11/4/2019 9/30/2019   WBC 4.0 - 11.0 K/uL 6.5 4.8   ANC 1.8 - 7.7 K/uL 3.1 2.2   HGB 11.7 - 16.1 g/dL 12.6 13.2    - 440 K/uL 250 238   INR 0.89 - 1.29 0.86 (L) 0.90   AST 10 - 37 U/L 30 46 (H)   ALT 5 - 40 U/L 38 69 (H)   Alk Phos 40 - 120 U/L 69 76   Bili, Total 0.2 - 1.2 mg/dL 0.2 0.3   Bili, Direct 0.0 - 0.3 mg/dL <0.2 <0.2   Albumin 3.5 - 5.0 g/dL 3.9 4.3   BUN 6 - 22 mg/dL 28 (H) 36 (H)   Creat 0.8 - 1.4 mg/dL 1.6 (H) 1.9 (H)   Na 133 - 145 mmol/L 145 140   K 3.5 - 5.5 mmol/L 5.8 (H) 5.2   Cl 98 - 110 mmol/L 108 100   CO2 20 - 32 mmol/L 21 24   Glucose 70 - 99 mg/dL 84 157 (H)   Magnesium 1.6 - 2.5 mg/dL 2.4 2.3     11/04/2019. Sirolimus: 2.7 ng/mL  09/30/2019. Sirolimus: 3.8 ng/mL    SEROLOGIES:  2/2012.   HAV total negative, HBsAntigen negative, anti-HBcore negative, anti-HBsurface negative, anti-HCV negative, Ferritin 20, NEVA negative, ASMA negative, AMA negative, ceruloplsmin 34, alpha-1-antitrypsin 195, A1AT phenotype MM.  2/2013. Ferritin 434, ASMA weak positive, CMV IgG positive, EBV IgG positive, anti-HIV negative, HBA1C 5.1    LIVER HISTOLOGY:  11/2017. Slides reviewed by MLS. Acute graft rejection. 6/2018. Slides reviewed by MLS. NAFL. 40% macro and micovesicular steatosis. No ballooning. No inflammation. No fibrosis. No rejection. ENDOSCOPIC PROCEDURES:  1/2012. EGD by Dr Ursula Winters. Esophageal varices. RADIOLOGY:  1/2012. CT scan abdomen with and without IV contrast.  Changes consistent with cirrhosis. No liver mass lesions. No dilated bile ducts. No bile duct strictures. Varices. Generalized ascites. 07/2012:  Ultrasound of the liver. Echogenic consistent with chronic liver disease, cirrhosis. No liver mass lesions. No ascites  11/2012: Ultrasound of the liver. Echogenic consistent with chronic liver disease, cirrhosis. No liver mass lesions. Small amount ascites present. 1/2013. Ultrasound of liver. Echogenic consistent with cirrhosis. No liver mass lesions. No dilated bile ducts. Mild ascites. 11/2013. Ultrasound of liver. Normal appearing liver. No liver mass lesions. 12/2013:  MRI of abdomen. Questionable small focal stenosis of distal common hepatic duct. Focal stenosis in mid-portal vein. 02/2015. Ultrasound of the liver. Consistent with cirrhosis. 06/2018. Ultrasound of the liver. Mixed echogenicity, complex collection in the medial right hepatic lobe most in keeping with hematoma. 05/2019. Pelvic MRI w/wo contrast.  Enhancing endocervical canal 1.5 cm lesion. 02/2020. Abdominal Ultrasound. The liver appeared diffusely echogenic.  No solid mass. OTHER TESTING:.   2/2013. ETOH negative. 8/2013:  DEXA scan - osteoporosis   10/2015. DEXA scan osteoporosis. 8/2018. TFTS.   TSH 0.38/T3 free 3.4/T4 free 1.3    25 minutes total time spent with this patient with more than 50% of this time spent counseling and coordinating care as described above. [address] in 3 months. Continue labs every 4 weeks.       DARCIE Capellan  Liver [place] of [address], [address] [place] [address] Marta Shepard, [address] [facility]   [phone]

## 2020-09-29 ENCOUNTER — OFFICE VISIT (OUTPATIENT)
Dept: HEMATOLOGY | Age: 65
End: 2020-09-29
Payer: MEDICARE

## 2020-09-29 VITALS
WEIGHT: 194.31 LBS | BODY MASS INDEX: 31.36 KG/M2 | TEMPERATURE: 99.1 F | OXYGEN SATURATION: 98 % | DIASTOLIC BLOOD PRESSURE: 48 MMHG | HEART RATE: 77 BPM | SYSTOLIC BLOOD PRESSURE: 119 MMHG

## 2020-09-29 DIAGNOSIS — Z94.4 H/O LIVER TRANSPLANT (HCC): Primary | ICD-10-CM

## 2020-09-29 PROCEDURE — G8427 DOCREV CUR MEDS BY ELIG CLIN: HCPCS | Performed by: INTERNAL MEDICINE

## 2020-09-29 PROCEDURE — G8536 NO DOC ELDER MAL SCRN: HCPCS | Performed by: INTERNAL MEDICINE

## 2020-09-29 PROCEDURE — G8400 PT W/DXA NO RESULTS DOC: HCPCS | Performed by: INTERNAL MEDICINE

## 2020-09-29 PROCEDURE — G0463 HOSPITAL OUTPT CLINIC VISIT: HCPCS | Performed by: INTERNAL MEDICINE

## 2020-09-29 PROCEDURE — G8417 CALC BMI ABV UP PARAM F/U: HCPCS | Performed by: INTERNAL MEDICINE

## 2020-09-29 PROCEDURE — 1101F PT FALLS ASSESS-DOCD LE1/YR: CPT | Performed by: INTERNAL MEDICINE

## 2020-09-29 PROCEDURE — 1090F PRES/ABSN URINE INCON ASSESS: CPT | Performed by: INTERNAL MEDICINE

## 2020-09-29 PROCEDURE — G8432 DEP SCR NOT DOC, RNG: HCPCS | Performed by: INTERNAL MEDICINE

## 2020-09-29 PROCEDURE — 3017F COLORECTAL CA SCREEN DOC REV: CPT | Performed by: INTERNAL MEDICINE

## 2020-09-29 PROCEDURE — 99215 OFFICE O/P EST HI 40 MIN: CPT | Performed by: INTERNAL MEDICINE

## 2020-09-29 NOTE — Clinical Note
9/29/20 Patient: Maciel Draper YOB: 1955 Date of Visit: 9/29/2020 Regis Aguirre MD 
[address] [place] [address], [address] [place] [address] VIA Facsimile: [phone] Dear Regis Aguirre MD, Thank you for referring Ms. Staci Christian to [address] for evaluation. My notes for this consultation are attached. If you have questions, please do not hesitate to call me. I look forward to following your patient along with you. Sincerely, Beverly Otoole MD

## 2020-09-29 NOTE — PROGRESS NOTES
[address] [place] [address], MD, Annell Rana, Jeri Fleeting, MD Danita Holter, ZAKI Tyson, ACNP-BC     Marilyn GU Aga, [facility]   LUCHO Cruz-ROZINA Douglas, [facility]       Marybel Pryor [facility] 136    at [address], [address] [place] [address], [facility] 22.    [phone]    FAX: [address] [place] [address], [address], [address]    [phone]    FAX: [phone]       Patient Care Team:  Phil Hinson MD as PCP - General (Family Medicine)  Rita Vasquez MD as Physician (Surgery)  Mathieu Barnard MD as Physician (Obstetrics & Gynecology)  Janice Marr MD as Physician (Neurosurgery)  Roman Bledsoe DDS as Physician (Dental General Practice)  Taisha Velazco O.D. as Physician (Optometry)  Henry Krishnan RN as Nurse Navigator  Shayna Noyola MD (Gastroenterology)  Kristen Harman MD (Internal Medicine)  Patricia Odom MD (Hematology and Oncology)  Cherylene Joe, DO (Rheumatology)  Serena Farmer MD (Dermatology)        Problem List  Date Reviewed: 6/29/2020          Codes Class Noted    Long term current use of immunosuppressive drug ICD-10-CM: Z79.899  ICD-9-CM: V58.69  4/18/2018        H/O liver transplant [facility]) ICD-10-CM: Z94.4  ICD-9-CM: V42.7  66/97/4267        Complication of transplanted liver [facility]) ICD-10-CM: T86.40  ICD-9-CM: 996.82  11/13/2013        Back pain, lumbosacral ICD-10-CM: M54.5  ICD-9-CM: 724.2, 724.6  1/27/2013        Diabetes mellitus ([facility] ICD-10-CM: E11.9  ICD-9-CM: 250.00  5/28/2012        Colon polyps ICD-10-CM: K63.5  ICD-9-CM: 211.3  5/28/2012        Bre[facility] cancer ([facility] ICD-10-CM: C50.919  ICD-9-CM: 174.9  5/28/2012    Overview Signed 5/28/2012  7:00 AM by Lavon Cade MD     Masectomy, chemotherapy,XRT. 1996  Tamoxifen [id number]               H/O shoulder surgery ICD-10-CM: Z98.890  ICD-9-CM: V45.89  5/28/2012    Overview Signed 5/28/2012  7:07 AM by Lavon Cade MD     12/2011                   [facility] for management of liver allograft function, to adjust immune suppression. The active problem list, all pertinent past medical history, medications, liver histology, endoscopic studies, radiologic findings and laboratory findings related to the liver disorder were reviewed with the patient. The patient underwent liver transplantation at [place], [place] in 4/2013. The patient is currently receiving sirolimus, cellcept for immune suppression. The patient had an acute graft rejection in 10/2017. She has a repeat liver biopsy because of persistent elevation in liver enzymes in 6/2018. This demonstrated steatosis. She had a post-LBX bleed into the liver. This appears to have resolved and the liver enzymes are now down to normal.    Had cone biopsy for cervical CA. Cervical CA removed with DNC in 06/18/2019 by Dr. Iván Castillo. She has followed up with the practice and margins are clean. The patient has no symptoms which can be attributed to the liver disorder. The patient has not experienced fatigue, fevers, chills. The patient completes all daily activities without any functional limitations. All of the issues listed in the Assessment and Plan were discussed with the patient. All questions were answered. 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   The date of the liver transplant was 4/2013    The most recent laboratory studies indicate that the liver transaminases are normal, alkaline phosphatase is normal, tests of hepatic synthetic and metabolic function are normal, and the platelet count is normal.      A liver biopsy from 11/2017 demonstrated acute rejection. Rejection was treated with high dose steroids and taper. A liver biopsy in 6/2018 demonstrated fatty liver with no rejection. Immune Suppression  The patient is receiving immune suppression with sirolimus which is being well tolerated at 1 mg/day. The immune suppression blood level is in the 5s. We will continue the Cellcept at 250 mg bid for now. NAFL  The diagnosis was made by liver biopsy in 6/2018. The need to perform an assessment of liver fibrosis was discussed with the patient. The Fibroscan can assess liver fibrosis and determine if a patient has advanced fibrosis or cirrhosis without the need for liver biopsy. This will be performed at the next office visit. The Fibroscan can be repeated annually or as often as clinically indicated to assess for fibrosis progression and/or regression. The patient was counseled regarding diet and exercise to achieve weight loss. The best diet for patients with fatty liver is one very low in carbohydrates and enriched with protein such as an Martha's program.    The patient was told not to consume any food or drinks products containing fructose as this enhances hepatic fat synthesis. Acute and chronic kidney injury   This is a common adverse event of immune suppression. The Screat is stable in the 1.6 mg range. Aspirin and NSAIDs should be avoided since these agents can worsen renal insufficiency. Hypercholesterolemia   This can be caused by immune suppression. Serum cholesterol is normal and does not need to be treated at this time. Hypertension   This is a common side effect of immune suppression. Blood pressure is well controlled on the current treatment. Low serum magnesium   This is a common side effect of immune suppression. The patient has a normal or near normal magnesium level and does not need supplementation.     Osteoporosis Osteoporosis is common in patients with cirhrosis prior to liver transplant. The patient had osteoporosis on DEXA scan from 2013 prior to the LT. The patient still had osteoporosis on DEXA scan from 10/2015. The patient was advised to take calcium supplementation and Vitamin D. The patient is taking prolia since 11/14/2018. The most recent DEXA in 7/2020 still showed osteoporosis. Monitoring for skin Cancer  The patient was counseled regarding increased risk of skin cancer in transplant recipients and need to have any new skin lesions evaluated by dermatology and removed if suspicious. The patient was instructed to see Dermatology annually examination. Colon polyps  The patient has had colon polyps in the past.  The last colonoscopy was in 2016 with Dr Wong Foreman. Vaccinations  Routine vaccinations against other bacterial and viral agents can be performed as long as this is with attentuatted virus. Live virus vaccines should not be administered. Annual flu vaccination should be administered. Has received flu vaccination 09/24/2019. ALLERGIES:  Allergies   Allergen Reactions    Tape [Adhesive] Other (comments)     Pulls skin off    Percocet [Oxycodone-Acetaminophen] Shortness of Breath, Itching and Other (comments)     \"Burning skin\"    Statins-Hmg-Coa Reductase Inhibitors Other (comments)     liver     MEDICATIONS:  Current Outpatient Medications   Medication Sig    ondansetron (ZOFRAN ODT) 4 mg disintegrating tablet Take 1 Tab by mouth every three to four (3-4) hours as needed for Nausea or Vomiting.  butalbital-aspirin-caffeine (FIORINAL) capsule butalbital-aspirin-caffeine 50 mg-325 mg-40 mg capsule    ergocalciferol, vitamin D2, 2,000 unit tab Take 1 Tab by mouth.  furosemide (LASIX) 40 mg tablet TAKE 1 TABLET BY MOUTH ONCE DAILY    lisinopril (PRINIVIL, ZESTRIL) 10 mg tablet Take 10 mg by mouth daily.     mycophenolate (CELLCEPT) 500 mg tablet TAKE 2 TABLETS BY MOUTH TWICE DAILY (Patient taking differently: TAKE 2 TABLETS BY MOUTH TWICE DAILY  Indications: taking differently, 250 mg bid)    pantoprazole (PROTONIX) 40 mg tablet pantoprazole 40 mg tablet,delayed release    denosumab (PROLIA) 60 mg/mL injection 60 mg by SubCUTAneous route.  sirolimus (RAPAMUNE) 1 mg tablet Take 4 tabs by mouth daily (Patient taking differently: Take 4 tabs by mouth daily  Indications: taking 2 mg/day)    insulin NPH/insulin regular (NOVOLIN 70/30, HUMULIN 70/30) 100 unit/mL (70-30) injection by SubCUTAneous route.  metoprolol tartrate (LOPRESSOR) 50 mg tablet Take 25 mg by mouth two (2) times a day.  cyproheptadine (PERIACTIN) 4 mg tablet Take 1 Tab by mouth three (3) times daily as needed. Indications: Pruritus of Skin    glimepiride (AMARYL) 4 mg tablet Take 4 mg by mouth daily. No current facility-administered medications for this visit. SYSTEM REVIEW NOT RELATED TO LIVER DISEASE OR REVIEWED ABOVE:  Constitution systems: Weight gain with steroids. Eyes: Negative for visual changes. ENT: Negative for sore throat, painful swallowing. Respiratory: Negative for cough, hemoptysis, SOB. Cardiology: Negative for chest pain, palpitations. GI:  Negative for constipation or diarrhea. : Negative for urinary frequency, dysuria, hematuria, nocturia. Skin: Negative for rash. Hematology: Negative for easy bruising, blood clots. Musculo-skelatal: Negative for muscle pain or weakness. Neurologic: Negative for headaches, dizziness, vertigo, memory problems not related to HE. Psychology: Negative for anxiety, depression. FAMILY HISTORY:  There is no family history of liver disease. SOCIAL HISTORY:  The patient is . There are 2 children and 2 grandchildren. The patient has never used tobacco products. The patient has never consumed significant amounts of alcohol.     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  She has not worked since the liver transplant. PHYSICAL EXAMINATION:  Visit Vitals  BP (!) 119/48   Pulse 77   Temp 99.1 °F (37.3 °C) (Tympanic)   Wt 194 lb 5 oz (88.1 kg)   SpO2 98%   BMI 31.36 kg/m²       General: Appears healthy. No acute distress. Eyes: Sclera anicteric. ENT: No oral lesions. Thyroid normal.  Nodes: No adenopathy. Skin: No spider angiomata. No jaundice. No palmar erythema. Respiratory: Lungs clear to auscultation. Cardiovascular: Regular heart rate. No murmurs. No JVD. Abdomen:   Well healed liver transplant incision. Soft non-tender. Liver size normal to percussion/palpation. Spleen not palpable. No obvious ascites. Extremities: No lower edema. No muscle wasting. Neurologic:  Alert and oriented. Cranial nerves grossly intact. No asterixsis. LAB STUDIES:  From 04/2020 [facility]):  AST/ ALT/ ALP/ T. BILI/ ALB: 57/ 65/ 79/ 0.3/ 3.7  BUN/ CRT: 25/ 2.0  RAPA: 5.2    From 05/2020 [facility]):  AST/ ALT/ ALP/ T. BILI/ ALB: 41/ 50/ 75/ 0.4/ 3.9  BUN/ CRT: 25/ 1.8  RAPA: 4.7    From 06/2020 [facility]):  AST/ ALT/ ALP/ T. BILI/ ALB: 74/ 75/ 76/ 0.3/ 4.1  BUN/ CRT:25/ 1.6  RAPA: 4.5    From 7/2020  AST/ALT/ALP/T Bili/ALB:  62/77/67/0.3/4.0  WBC/HB/PLT/INR:  5.0/13/0/250  NA/BUN/CREAT:  24/1.6  RAPA:  5.4  3.8 ng/mL    SEROLOGIES:  2/2012. HAV total negative, HBsAntigen negative, anti-HBcore negative, anti-HBsurface negative, anti-HCV negative, Ferritin 20, NEVA negative, ASMA negative, AMA negative, ceruloplsmin 34, alpha-1-antitrypsin 195, A1AT phenotype MM.  2/2013. Ferritin 434, ASMA weak positive, CMV IgG positive, EBV IgG positive, anti-HIV negative, HBA1C 5.1    LIVER HISTOLOGY:  11/2017. Slides reviewed by MLS. Acute graft rejection. 6/2018. Slides reviewed by MLS. NAFL. 40% macro and micovesicular steatosis. No ballooning. No inflammation. No fibrosis. No rejection. ENDOSCOPIC PROCEDURES:  1/2012. EGD by Dr Navid Mallory. Esophageal varices. RADIOLOGY:  1/2012. CT scan abdomen with and without IV contrast.  Changes consistent with cirrhosis. No liver mass lesions. No dilated bile ducts. No bile duct strictures. Varices. Generalized ascites. 07/2012:  Ultrasound of the liver. Echogenic consistent with chronic liver disease, cirrhosis. No liver mass lesions. No ascites  11/2012: Ultrasound of the liver. Echogenic consistent with chronic liver disease, cirrhosis. No liver mass lesions. Small amount ascites present. 1/2013. Ultrasound of liver. Echogenic consistent with cirrhosis. No liver mass lesions. No dilated bile ducts. Mild ascites. 11/2013. Ultrasound of liver. Normal appearing liver. No liver mass lesions. 12/2013:  MRI of abdomen. Questionable small focal stenosis of distal common hepatic duct. Focal stenosis in mid-portal vein. 02/2015. Ultrasound of the liver. Consistent with cirrhosis. 06/2018. Ultrasound of the liver. Mixed echogenicity, complex collection in the medial right hepatic lobe most in keeping with hematoma. 05/2019. Pelvic MRI w/wo contrast.  Enhancing endocervical canal 1.5 cm lesion. 02/2020. Abdominal Ultrasound. The liver appeared diffusely echogenic.  No solid mass. OTHER TESTING:.   2/2013. ETOH negative. 8/2013:  DEXA scan - osteoporosis   10/2015. DEXA scan osteoporosis. 8/2018. TFTS. TSH 0.38/T3 free 3.4/T4 free 1.3    25 minutes total time spent with this patient with more than 50% of this time spent counseling and coordinating care as described above. FOLLOW-UP:  All of the issues listed above in the Assessment and Plan were discussed with the patient. All questions were answered. The patient expressed a clear understanding of the above. [address] [id number] in 3 months for Fibroscan and for routine monitoring. to review all data and determine the treatment plan.       Pratima Carter MD  [facility] 13 of [address]  4 [facility] Viktoriya Gould [address], [address]  12 [facility]

## 2020-10-19 RX ORDER — FUROSEMIDE 40 MG/1
TABLET ORAL
Qty: 90 TAB | Refills: 0 | Status: SHIPPED | OUTPATIENT
Start: 2020-10-19 | End: 2021-01-18

## 2020-12-14 ENCOUNTER — OFFICE VISIT (OUTPATIENT)
Dept: HEMATOLOGY | Age: 65
End: 2020-12-14
Payer: MEDICARE

## 2020-12-14 VITALS
HEART RATE: 74 BPM | BODY MASS INDEX: 31.18 KG/M2 | RESPIRATION RATE: 17 BRPM | OXYGEN SATURATION: 98 % | DIASTOLIC BLOOD PRESSURE: 58 MMHG | WEIGHT: 194 LBS | HEIGHT: 66 IN | SYSTOLIC BLOOD PRESSURE: 114 MMHG | TEMPERATURE: 97.1 F

## 2020-12-14 DIAGNOSIS — Z94.4 S/P LIVER TRANSPLANT (HCC): Primary | ICD-10-CM

## 2020-12-14 PROCEDURE — G8536 NO DOC ELDER MAL SCRN: HCPCS | Performed by: NURSE PRACTITIONER

## 2020-12-14 PROCEDURE — G8400 PT W/DXA NO RESULTS DOC: HCPCS | Performed by: NURSE PRACTITIONER

## 2020-12-14 PROCEDURE — 91200 LIVER ELASTOGRAPHY: CPT | Performed by: NURSE PRACTITIONER

## 2020-12-14 PROCEDURE — G0463 HOSPITAL OUTPT CLINIC VISIT: HCPCS | Performed by: NURSE PRACTITIONER

## 2020-12-14 PROCEDURE — 1090F PRES/ABSN URINE INCON ASSESS: CPT | Performed by: NURSE PRACTITIONER

## 2020-12-14 PROCEDURE — G8432 DEP SCR NOT DOC, RNG: HCPCS | Performed by: NURSE PRACTITIONER

## 2020-12-14 PROCEDURE — G8417 CALC BMI ABV UP PARAM F/U: HCPCS | Performed by: NURSE PRACTITIONER

## 2020-12-14 PROCEDURE — 99214 OFFICE O/P EST MOD 30 MIN: CPT | Performed by: NURSE PRACTITIONER

## 2020-12-14 PROCEDURE — 3017F COLORECTAL CA SCREEN DOC REV: CPT | Performed by: NURSE PRACTITIONER

## 2020-12-14 PROCEDURE — 1101F PT FALLS ASSESS-DOCD LE1/YR: CPT | Performed by: NURSE PRACTITIONER

## 2020-12-14 PROCEDURE — G8427 DOCREV CUR MEDS BY ELIG CLIN: HCPCS | Performed by: NURSE PRACTITIONER

## 2020-12-14 NOTE — PROGRESS NOTES
[facility], Rakel NELSON, MD Benjamin Landry PA-C Tor Guiles, ACNP-BC     Marilyn GU Aga, [facility]   LUCHO Higgins-ROZINA Hoskins, [facility]       [facility]bel [facility]    at [address], 81 [facility], [facility] 22.    [phone]    FAX: [address] [place] [address], [address], [address]    [phone]    FAX: [phone]       Patient Care Team:  Sylvia Severin, MD as PCP - General (Family Medicine)  Suzan Whelan MD as Physician (Surgery)  Lon Vazquez MD as Physician (Obstetrics & Gynecology)  Stephanie Hidalgo MD as Physician (Neurosurgery)  Yovani Cervantes DDS as Physician (Dental General Practice)  Berenice Cannon O.D. as Physician (Optometry)  Chandler Friedman RN as Nurse Navigator  Chyna Zhou MD (Gastroenterology)  Macey Gunter MD (Internal Medicine)  Jose Sanches MD (Hematology and Oncology)  Michael Quarles DO (Rheumatology)  Richard Maxwell MD (Dermatology)        Problem List  Date Reviewed: 12/14/2020          Codes Class Noted    Long term current use of immunosuppressive drug ICD-10-CM: Z79.899  ICD-9-CM: V58.69  4/18/2018        H/O liver transplant [facility]) ICD-10-CM: Z94.4  ICD-9-CM: V42.7  48/12/1597        Complication of transplanted liver [facility]) ICD-10-CM: T86.40  ICD-9-CM: 996.82  11/13/2013        Back pain, lumbosacral ICD-10-CM: M54.5  ICD-9-CM: 724.2, 724.6  1/27/2013        Diabetes mellitus ([facility] ICD-10-CM: E11.9  ICD-9-CM: 250.00  5/28/2012        Colon polyps ICD-10-CM: K63.5  ICD-9-CM: 211.3  5/28/2012        Breast cancer ([facility] ICD-10-CM: C50.919  ICD-9-CM: 174.9  5/28/2012    Overview Signed 5/28/2012  7:00 AM by Maty Calvillo MD     Masectomy, chemotherapy,XRT. 1996  Tamoxifen [id number]               H/O shoulder surgery ICD-10-CM: Z98.890  ICD-9-CM: V45.89  5/28/2012    Overview Signed 5/28/2012  7:07 AM by Maty Calvillo MD     12/2011                   Lynnette Santos returns to the [place] [address] of [facility] for fibroscan assessment of hepatic fibrosis and for management of liver allograft function, to adjust immune suppression. The active problem list, all pertinent past medical history, medications, liver histology, endoscopic studies, radiologic findings and laboratory findings related to the liver disorder were reviewed with the patient. The patient underwent liver transplantation at [place], [place] in 4/2013. The patient is currently receiving sirolimus, cellcept for immune suppression. The patient had an acute graft rejection in 10/2017. She has a repeat liver biopsy because of persistent elevation in liver enzymes in 6/2018. This demonstrated steatosis. She had a post-LBX bleed into the liver. This appears to have resolved and the liver enzymes are now down to normal.    Had cone biopsy for cervical CA. Cervical CA removed with DNC in 06/18/2019 by Dr. Jennifer Luna. She has followed up with the practice and margins are clean. The patient has no symptoms which can be attributed to the liver disorder. The patient has not experienced fatigue, fevers, chills. The patient completes all daily activities without any functional limitations. All of the issues listed in the Assessment and Plan were discussed with the patient. All questions were answered. 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   The date of the liver transplant was 4/2013    The most recent laboratory studies indicate that the liver transaminases are normal, alkaline phosphatase is normal, tests of hepatic synthetic and metabolic function are normal, and the platelet count is normal.      A liver biopsy from 11/2017 demonstrated acute rejection. Rejection was treated with high dose steroids and taper. A liver biopsy in 6/2018 demonstrated fatty liver with no rejection. Immune Suppression  The patient is receiving immune suppression with sirolimus which is being well tolerated at 1 mg/day. The immune suppression blood level is in the 4s. We will continue the Cellcept at 250 mg bid for now. NAFL  The diagnosis was made by liver biopsy in 6/2018. The need to perform an assessment of liver fibrosis was discussed with the patient. The Fibroscan can assess liver fibrosis and determine if a patient has advanced fibrosis or cirrhosis without the need for liver biopsy. This was performed in the office today. Today's fibroscan analysis indicates normal to mild hepatic fibrosis, Metavir fibrosis score of F1. The Fibroscan can be repeated annually or as often as clinically indicated to assess for fibrosis progression and/or regression. The patient was counseled regarding diet and exercise to achieve weight loss. The best diet for patients with fatty liver is one very low in carbohydrates and enriched with protein such as an Martha[employer] program.    The patient was told not to consume any food or drinks products containing fructose as this enhances hepatic fat synthesis. Acute and chronic kidney injury   This is a common adverse event of immune suppression. The Screat is stable in the 1.6 mg range. Aspirin and NSAIDs should be avoided since these agents can worsen renal insufficiency. Hypercholesterolemia   This can be caused by immune suppression. Serum cholesterol is normal and does not need to be treated at this time. Hypertension   This is a common side effect of immune suppression. Blood pressure is well controlled on the current treatment.     Low serum magnesium   This is a common side effect of immune suppression. The patient has a normal or near normal magnesium level and does not need supplementation. Osteoporosis   Osteoporosis is common in patients with cirhrosis prior to liver transplant. The patient had osteoporosis on DEXA scan from 2013 prior to the LT. The patient still had osteoporosis on DEXA scan from 10/2015. The patient was advised to take calcium supplementation and Vitamin D. The patient is taking prolia since 11/14/2018. The most recent DEXA in 7/2020 still showed osteoporosis. Monitoring for skin Cancer  The patient was counseled regarding increased risk of skin cancer in transplant recipients and need to have any new skin lesions evaluated by dermatology and removed if suspicious. The patient was instructed to see Dermatology annually examination. Colon polyps  The patient has had colon polyps in the past.  The last colonoscopy was in 2016 with Dr Efren Tariq. Vaccinations  Routine vaccinations against other bacterial and viral agents can be performed as long as this is with attentuatted virus. Live virus vaccines should not be administered. Annual flu vaccination should be administered. ALLERGIES:  Allergies   Allergen Reactions    Tape [Adhesive] Other (comments)     Pulls skin off    Percocet [Oxycodone-Acetaminophen] Shortness of Breath, Itching and Other (comments)     \"Burning skin\"    Statins-Hmg-Coa Reductase Inhibitors Other (comments)     liver     MEDICATIONS:  Current Outpatient Medications   Medication Sig    furosemide (LASIX) 40 mg tablet Take 1 tablet by mouth once daily    ondansetron (ZOFRAN ODT) 4 mg disintegrating tablet Take 1 Tab by mouth every three to four (3-4) hours as needed for Nausea or Vomiting.  butalbital-aspirin-caffeine (FIORINAL) capsule butalbital-aspirin-caffeine 50 mg-325 mg-40 mg capsule    ergocalciferol, vitamin D2, 2,000 unit tab Take 1 Tab by mouth.     lisinopril (PRINIVIL, ZESTRIL) 10 mg tablet Take 10 mg by mouth daily.  mycophenolate (CELLCEPT) 500 mg tablet TAKE 2 TABLETS BY MOUTH TWICE DAILY (Patient taking differently: TAKE 2 TABLETS BY MOUTH TWICE DAILY  Indications: taking differently, 250 mg bid)    pantoprazole (PROTONIX) 40 mg tablet pantoprazole 40 mg tablet,delayed release    denosumab (PROLIA) 60 mg/mL injection 60 mg by SubCUTAneous route.  sirolimus (RAPAMUNE) 1 mg tablet Take 4 tabs by mouth daily (Patient taking differently: Take 4 tabs by mouth daily  Indications: taking 2 mg/day)    insulin NPH/insulin regular (NOVOLIN 70/30, HUMULIN 70/30) 100 unit/mL (70-30) injection by SubCUTAneous route.  metoprolol tartrate (LOPRESSOR) 50 mg tablet Take 25 mg by mouth two (2) times a day.  cyproheptadine (PERIACTIN) 4 mg tablet Take 1 Tab by mouth three (3) times daily as needed. Indications: Pruritus of Skin    glimepiride (AMARYL) 4 mg tablet Take 4 mg by mouth daily. No current facility-administered medications for this visit. SYSTEM REVIEW NOT RELATED TO LIVER DISEASE OR REVIEWED ABOVE:  Constitution systems: Weight gain with steroids. Eyes: Negative for visual changes. ENT: Negative for sore throat, painful swallowing. Respiratory: Negative for cough, hemoptysis, SOB. Cardiology: Negative for chest pain, palpitations. GI:  Negative for constipation or diarrhea. : Negative for urinary frequency, dysuria, hematuria, nocturia. Skin: Negative for rash. Hematology: Negative for easy bruising, blood clots. Musculo-skelatal: Negative for muscle pain or weakness. Neurologic: Negative for headaches, dizziness, vertigo, memory problems not related to HE. Psychology: Negative for anxiety, depression. FAMILY HISTORY:  There is no family history of liver disease. SOCIAL HISTORY:  The patient is . There are 2 children and 2 grandchildren. The patient has never used tobacco products.     The patient has never consumed significant amounts of alcohol. The patient used to work for [employer] and then as an  for [employer].  She has not worked since the liver transplant. PHYSICAL EXAMINATION:  Visit Vitals  BP (!) 114/58 (BP 1 Location: Left arm, BP Patient Position: Sitting)   Pulse 74   Temp 97.1 °F (36.2 °C)   Resp 17   Ht 5' 6\" (1.676 m)   Wt 194 lb (88 kg)   SpO2 98%   BMI 31.31 kg/m²       General: Appears healthy. No acute distress. Eyes: Sclera anicteric. ENT: No oral lesions. Thyroid normal.  Nodes: No adenopathy. Skin: No spider angiomata. No jaundice. No palmar erythema. Respiratory: Lungs clear to auscultation. Cardiovascular: Regular heart rate. No murmurs. No JVD. Abdomen:   Well healed liver transplant incision. Soft non-tender. Liver size normal to percussion/palpation. Spleen not palpable. No obvious ascites. Extremities: No lower edema. No muscle wasting. Neurologic:  Alert and oriented. Cranial nerves grossly intact. No asterixsis. LAB STUDIES:  From 2020 DR JOHNSON [facility]):  AST/ ALT/ ALP/ T. BILI/ ALB: 34/ 53/ 72/ 0.3/ 3.9  BUN/ CRT/ PLT: 41/ 1.8/ 273  MA.6  SIR: 4.0    SEROLOGIES:  2012. HAV total negative, HBsAntigen negative, anti-HBcore negative, anti-HBsurface negative, anti-HCV negative, Ferritin 20, NEVA negative, ASMA negative, AMA negative, ceruloplsmin 34, alpha-1-antitrypsin 195, A1AT phenotype MM.  2013. Ferritin 434, ASMA weak positive, CMV IgG positive, EBV IgG positive, anti-HIV negative, HBA1C 5.1    LIVER HISTOLOGY:  2017. Slides reviewed by MLS. Acute graft rejection. 2018. Slides reviewed by MLS. NAFL. 40% macro and micovesicular steatosis. No ballooning. No inflammation. No fibrosis. No rejection. ENDOSCOPIC PROCEDURES:  2012. EGD by Dr Paulina Esquivel. Esophageal varices. RADIOLOGY:  2012. CT scan abdomen with and without IV contrast.  Changes consistent with cirrhosis. No liver mass lesions.   No dilated bile ducts. No bile duct strictures. Varices. Generalized ascites. 07/2012:  Ultrasound of the liver. Echogenic consistent with chronic liver disease, cirrhosis. No liver mass lesions. No ascites  11/2012: Ultrasound of the liver. Echogenic consistent with chronic liver disease, cirrhosis. No liver mass lesions. Small amount ascites present. 1/2013. Ultrasound of liver. Echogenic consistent with cirrhosis. No liver mass lesions. No dilated bile ducts. Mild ascites. 11/2013. Ultrasound of liver. Normal appearing liver. No liver mass lesions. 12/2013:  MRI of abdomen. Questionable small focal stenosis of distal common hepatic duct. Focal stenosis in mid-portal vein. 02/2015. Ultrasound of the liver. Consistent with cirrhosis. 06/2018. Ultrasound of the liver. Mixed echogenicity, complex collection in the medial right hepatic lobe most in keeping with hematoma. 05/2019. Pelvic MRI w/wo contrast.  Enhancing endocervical canal 1.5 cm lesion. 02/2020. Abdominal Ultrasound. The liver appeared diffusely echogenic.  No solid mass. OTHER TESTING:.   2/2013. ETOH negative. 8/2013:  DEXA scan - osteoporosis   10/2015. DEXA scan osteoporosis. 8/2018. TFTS. TSH 0.38/T3 free 3.4/T4 free 1.3    25 minutes total time spent with this patient with more than 50% of this time spent counseling and coordinating care as described above. FOLLOW-UP:  All of the issues listed above in the Assessment and Plan were discussed with the patient. All questions were answered. The patient expressed a clear understanding of the above. [address] in 3 months for routine monitoring.     DARCIE Houston  [facility] [place] of [address], [address] [place] [address]   [phone]

## 2021-01-18 RX ORDER — FUROSEMIDE 40 MG/1
TABLET ORAL
Qty: 90 TAB | Refills: 0 | Status: SHIPPED | OUTPATIENT
Start: 2021-01-18 | End: 2021-04-12

## 2021-02-15 ENCOUNTER — HOSPITAL ENCOUNTER (OUTPATIENT)
Dept: LAB | Age: 66
Discharge: HOME | End: 2021-02-15
Payer: MEDICARE

## 2021-02-15 PROCEDURE — 88175 CYTOPATH C/V AUTO FLUID REDO: CPT

## 2021-03-15 ENCOUNTER — OFFICE VISIT (OUTPATIENT)
Dept: HEMATOLOGY | Age: 66
End: 2021-03-15
Payer: MEDICARE

## 2021-03-15 VITALS
BODY MASS INDEX: 31.82 KG/M2 | WEIGHT: 198 LBS | SYSTOLIC BLOOD PRESSURE: 117 MMHG | DIASTOLIC BLOOD PRESSURE: 64 MMHG | OXYGEN SATURATION: 98 % | HEIGHT: 66 IN | TEMPERATURE: 98 F | RESPIRATION RATE: 17 BRPM | HEART RATE: 77 BPM

## 2021-03-15 DIAGNOSIS — Z94.4 S/P LIVER TRANSPLANT (HCC): Primary | ICD-10-CM

## 2021-03-15 PROCEDURE — G0463 HOSPITAL OUTPT CLINIC VISIT: HCPCS | Performed by: NURSE PRACTITIONER

## 2021-03-15 PROCEDURE — 1101F PT FALLS ASSESS-DOCD LE1/YR: CPT | Performed by: NURSE PRACTITIONER

## 2021-03-15 PROCEDURE — G8400 PT W/DXA NO RESULTS DOC: HCPCS | Performed by: NURSE PRACTITIONER

## 2021-03-15 PROCEDURE — G8427 DOCREV CUR MEDS BY ELIG CLIN: HCPCS | Performed by: NURSE PRACTITIONER

## 2021-03-15 PROCEDURE — 3017F COLORECTAL CA SCREEN DOC REV: CPT | Performed by: NURSE PRACTITIONER

## 2021-03-15 PROCEDURE — 1090F PRES/ABSN URINE INCON ASSESS: CPT | Performed by: NURSE PRACTITIONER

## 2021-03-15 PROCEDURE — 99214 OFFICE O/P EST MOD 30 MIN: CPT | Performed by: NURSE PRACTITIONER

## 2021-03-15 PROCEDURE — G8536 NO DOC ELDER MAL SCRN: HCPCS | Performed by: NURSE PRACTITIONER

## 2021-03-15 PROCEDURE — G8417 CALC BMI ABV UP PARAM F/U: HCPCS | Performed by: NURSE PRACTITIONER

## 2021-03-15 PROCEDURE — G8432 DEP SCR NOT DOC, RNG: HCPCS | Performed by: NURSE PRACTITIONER

## 2021-03-15 RX ORDER — ONDANSETRON 4 MG/1
TABLET, FILM COATED ORAL
COMMUNITY
End: 2021-03-15 | Stop reason: SDUPTHER

## 2021-03-15 RX ORDER — MYCOPHENOLATE MOFETIL 500 MG/1
TABLET ORAL
Qty: 120 TAB | Refills: 6 | Status: SHIPPED | OUTPATIENT
Start: 2021-03-15 | End: 2021-06-23 | Stop reason: DRUGHIGH

## 2021-03-15 RX ORDER — SIROLIMUS 1 MG/1
TABLET, FILM COATED ORAL
Qty: 360 TAB | Refills: 3 | Status: SHIPPED | OUTPATIENT
Start: 2021-03-15

## 2021-03-15 NOTE — PROGRESS NOTES
[address] [place] [address], MD, Priyanka Davies MD Garrie Mans PA-ROZINA Bloom, [facility]     Marilyn GU Aga, [facility]   Lynn Crespo FNP-ROZINA Renteria, [facility]       Marybel Newell [address]    at [address], [address] Kimberley Juares  22.    [phone]    FAX: [address] [place] [address] Drive, [address], [address]    [phone]    FAX: [phone]       Patient Care Team:  Jesika Morris MD as PCP - General (Family Medicine)  Amparo Tenorio MD as Physician (Surgery)  Eliecer Saucedo MD as Physician (Obstetrics & Gynecology)  Dameon Valencia MD as Physician (Neurosurgery)  Lavonne Chase DDS as Physician (Dental General Practice)  Suzanne Snowden O.D. as Physician (Optometry)  Dennis Hull RN as Nurse Analisa Moreno MD (Gastroenterology)  Nichole Carrel, MD (Internal Medicine)  Fifi Workman MD (Hematology and Oncology)  Kati Smaayoa DO (Rheumatology)  Danish Foss MD (Dermatology)  Prudence Ely MD (Endocrinology)        Problem List  Date Reviewed: 3/15/2021          Codes Class Noted    Long term current use of immunosuppressive drug ICD-10-CM: Z79.899  ICD-9-CM: V58.69  4/18/2018        H/O liver transplant [facility]) ICD-10-CM: Z94.4  ICD-9-CM: V42.7  76/65/5676        Complication of transplanted liver [facility]) ICD-10-CM: T86.40  ICD-9-CM: 996.82  11/13/2013        Back pain, lumbosacral ICD-10-CM: M54.5  ICD-9-CM: 724.2, 724.6  1/27/2013        Di[facility] ICD-10-CM: E11.9  ICD-9-CM: 250.00  5/28/2012        Colon polyps ICD-10-CM: K63.5  ICD-9-CM: 211.3  5/28/2012        Breast cancer ([facility].) ICD-10-CM: C50.919  ICD-9-CM: 174.9  5/28/2012 Overview Signed 5/28/2012  7:00 AM by Osman Hall MD     Masectomy, chemotherapy,XRT. 1996  Tamoxifen [id number]               H/O shoulder surgery ICD-10-CM: Z98.890  ICD-9-CM: V45.89  5/28/2012    Overview Signed 5/28/2012  7:07 AM by Osman Hall MD     12/2011                   Paola Cowan returns to the [address] [address] of [facility] for management of liver allograft function, to adjust immune suppression. The active problem list, all pertinent past medical history, medications, liver histology, endoscopic studies, radiologic findings and laboratory findings related to the liver disorder were reviewed with the patient. The patient underwent liver transplantation at [place], [place] in 4/2013. The patient is currently receiving sirolimus, cellcept for immune suppression. The patient had an acute graft rejection in 10/2017. She has a repeat liver biopsy because of persistent elevation in liver enzymes in 6/2018. This demonstrated steatosis. She had a post-LBX bleed into the liver. This appears to have resolved and the liver enzymes are now down to normal.    Had cone biopsy for cervical CA. Cervical CA removed with DNC in 06/18/2019 by Dr. Naya Guerrier. She has followed up with the practice and margins are clean. The patient has no symptoms which can be attributed to the liver disorder. The patient has not experienced fatigue, fevers, chills. The patient completes all daily activities without any functional limitations. All of the issues listed in the Assessment and Plan were discussed with the patient. All questions were answered. Since the last office appointment:  Seeing a new endocrinologist.  Daniel Navarrete Amaryl 03/09/2021. Had labs. 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   The date of the liver transplant was 4/2013    The most recent laboratory studies indicate that the liver transaminases are normal, alkaline phosphatase is normal, tests of hepatic synthetic and metabolic function are normal, and the platelet count is normal.      A liver biopsy from 11/2017 demonstrated acute rejection. Rejection was treated with high dose steroids and taper. A liver biopsy in 6/2018 demonstrated fatty liver with no rejection. Immune Suppression  The patient is receiving immune suppression with sirolimus which is being well tolerated at 1 mg/day. The immune suppression blood level is in the 4s. We will continue the Cellcept at 250 mg bid for now. NAFL  The diagnosis was made by liver biopsy in 6/2018. The need to perform an assessment of liver fibrosis was discussed with the patient. The Fibroscan can assess liver fibrosis and determine if a patient has advanced fibrosis or cirrhosis without the need for liver biopsy. This was performed in the office today. Today's fibroscan analysis indicates normal to mild hepatic fibrosis, Metavir fibrosis score of F1. The Fibroscan can be repeated annually or as often as clinically indicated to assess for fibrosis progression and/or regression. The patient was counseled regarding diet and exercise to achieve weight loss. The best diet for patients with fatty liver is one very low in carbohydrates and enriched with protein such as an Martha's program.    The patient was told not to consume any food or drinks products containing fructose as this enhances hepatic fat synthesis. Acute and chronic kidney injury   This is a common adverse event of immune suppression. The Screat is stable in the 1.6 mg range. Aspirin and NSAIDs should be avoided since these agents can worsen renal insufficiency. Hypercholesterolemia   This can be caused by immune suppression. Serum cholesterol is normal and does not need to be treated at this time. Hypertension   This is a common side effect of immune suppression.     Blood pressure is well controlled on the current treatment. Low serum magnesium   This is a common side effect of immune suppression. The patient has a normal magnesium level and does not need supplementation. Osteoporosis   Osteoporosis is common in patients with cirhrosis prior to liver transplant. The patient had osteoporosis on DEXA scan from 2013 prior to the LT. The patient still had osteoporosis on DEXA scan from 10/2015. The patient was advised to take calcium supplementation and Vitamin D. The patient is taking prolia since 11/14/2018. The most recent DEXA in 7/2020 still showed osteoporosis. Monitoring for skin Cancer  The patient was counseled regarding increased risk of skin cancer in transplant recipients and need to have any new skin lesions evaluated by dermatology and removed if suspicious. The patient was instructed to see Dermatology annually examination. Colon polyps  The patient has had colon polyps in the past.  The last colonoscopy was in 2016 with Dr Liborio Saleem. Vaccinations  Routine vaccinations against other bacterial and viral agents can be performed as long as this is with attentuatted virus. Live virus vaccines should not be administered. Annual flu vaccination should be administered. ALLERGIES:  Allergies   Allergen Reactions    Tape [Adhesive] Other (comments)     Pulls skin off    Percocet [Oxycodone-Acetaminophen] Shortness of Breath, Itching and Other (comments)     \"Burning skin\"    Statins-Hmg-Coa Reductase Inhibitors Other (comments)     liver     MEDICATIONS:  Current Outpatient Medications   Medication Sig    furosemide (LASIX) 40 mg tablet Take 1 tablet by mouth once daily    ondansetron (ZOFRAN ODT) 4 mg disintegrating tablet Take 1 Tab by mouth every three to four (3-4) hours as needed for Nausea or Vomiting.     butalbital-aspirin-caffeine (FIORINAL) capsule butalbital-aspirin-caffeine 50 mg-325 mg-40 mg capsule    lisinopril (PRINIVIL, ZESTRIL) 10 mg tablet Take 10 mg by mouth daily.  mycophenolate (CELLCEPT) 500 mg tablet TAKE 2 TABLETS BY MOUTH TWICE DAILY (Patient taking differently: TAKE 2 TABLETS BY MOUTH TWICE DAILY  Indications: taking differently, 250 mg bid)    pantoprazole (PROTONIX) 40 mg tablet pantoprazole 40 mg tablet,delayed release    denosumab (PROLIA) 60 mg/mL injection 60 mg by SubCUTAneous route.  sirolimus (RAPAMUNE) 1 mg tablet Take 4 tabs by mouth daily (Patient taking differently: Take 4 tabs by mouth daily  Indications: taking 2 mg/day)    insulin NPH/insulin regular (NOVOLIN 70/30, HUMULIN 70/30) 100 unit/mL (70-30) injection by SubCUTAneous route.  metoprolol tartrate (LOPRESSOR) 50 mg tablet Take 25 mg by mouth two (2) times a day. No current facility-administered medications for this visit. SYSTEM REVIEW NOT RELATED TO LIVER DISEASE OR REVIEWED ABOVE:  Constitution systems: Weight gain with steroids. Eyes: Negative for visual changes. ENT: Negative for sore throat, painful swallowing. Respiratory: Negative for cough, hemoptysis, SOB. Cardiology: Negative for chest pain, palpitations. GI:  Negative for constipation or diarrhea. : Negative for urinary frequency, dysuria, hematuria, nocturia. Skin: Negative for rash. Hematology: Negative for easy bruising, blood clots. Musculo-skelatal: Negative for muscle pain or weakness. Neurologic: Negative for headaches, dizziness, vertigo, memory problems not related to HE. Psychology: Negative for anxiety, depression. FAMILY HISTORY:  There is no family history of liver disease. SOCIAL HISTORY:  The patient is . There are 2 children and 2 grandchildren. The patient has never used tobacco products. The patient has never consumed significant amounts of alcohol.     The patient used to work for [employer] and then as an  for [employer].  She has not worked since the liver transplant. PHYSICAL EXAMINATION:  Visit Vitals  /64 (BP 1 Location: Left upper arm, BP Patient Position: Sitting)   Pulse 77   Temp 98 °F (36.7 °C)   Resp 17   Ht 5' 6\" (1.676 m)   Wt 198 lb (89.8 kg)   SpO2 98%   BMI 31.96 kg/m²       General: Appears healthy. No acute distress. Eyes: Sclera anicteric. ENT: No oral lesions. Thyroid normal.  Nodes: No adenopathy. Skin: No spider angiomata. No jaundice. No palmar erythema. Respiratory: Lungs clear to auscultation. Cardiovascular: Regular heart rate. No murmurs. No JVD. Abdomen:   Well healed liver transplant incision. Soft non-tender. Liver size normal to percussion/palpation. Spleen not palpable. No obvious ascites. Extremities: No lower edema. No muscle wasting. Neurologic:  Alert and oriented. Cranial nerves grossly intact. No asterixsis. LAB STUDIES:  From 2021 [facility]):  AST/ ALT/ ALP/ T. BILI/ ALB: 46/ 52/ 67/ 0.3/ 3.9  BUN/ CRT/ PLT: 32/ 1.9/ 249  MA.3  SIR: 4.1    From 2021 [facility]):  AST/ ALT/ ALP/ T. BILI/ ALB: 50/ 49/ 63/ 0.2  BUN/ CRT/ PLT:  19/ 1.4/ 249/  MA.3  SIR: 4.5    From 2021 [facility]):  AST/ ALT/ ALP/ T. BILI/ ALB:   BUN/ CRT/ PLT: 18/ 1.4/ 235  MA.4  SIR:  5.0    From 2020 [facility]):  AST/ ALT/ ALP/ T. BILI/ ALB: 34/ 53/ 72/ 0.3/ 3.9  BUN/ CRT/ PLT: 41/ 1.8/ 273  MA.6  SIR: 4.0    SEROLOGIES:  2012. HAV total negative, HBsAntigen negative, anti-HBcore negative, anti-HBsurface negative, anti-HCV negative, Ferritin 20, NEVA negative, ASMA negative, AMA negative, ceruloplsmin 34, alpha-1-antitrypsin 195, A1AT phenotype MM.  2013. Ferritin 434, ASMA weak positive, CMV IgG positive, EBV IgG positive, anti-HIV negative, HBA1C 5.1    LIVER HISTOLOGY:  2017. Slides reviewed by MLS. Acute graft rejection. 2018. Slides reviewed by MLS. NAFL. 40% macro and micovesicular steatosis. No ballooning. No inflammation. No fibrosis. No rejection.     ENDOSCOPIC PROCEDURES:  1/2012. EGD by Dr Breonna Perez. Esophageal varices. RADIOLOGY:  1/2012. CT scan abdomen with and without IV contrast.  Changes consistent with cirrhosis. No liver mass lesions. No dilated bile ducts. No bile duct strictures. Varices. Generalized ascites. 07/2012:  Ultrasound of the liver. Echogenic consistent with chronic liver disease, cirrhosis. No liver mass lesions. No ascites  11/2012: Ultrasound of the liver. Echogenic consistent with chronic liver disease, cirrhosis. No liver mass lesions. Small amount ascites present. 1/2013. Ultrasound of liver. Echogenic consistent with cirrhosis. No liver mass lesions. No dilated bile ducts. Mild ascites. 11/2013. Ultrasound of liver. Normal appearing liver. No liver mass lesions. 12/2013:  MRI of abdomen. Questionable small focal stenosis of distal common hepatic duct. Focal stenosis in mid-portal vein. 02/2015. Ultrasound of the liver. Consistent with cirrhosis. 06/2018. Ultrasound of the liver. Mixed echogenicity, complex collection in the medial right hepatic lobe most in keeping with hematoma. 05/2019. Pelvic MRI w/wo contrast.  Enhancing endocervical canal 1.5 cm lesion. 02/2020. Abdominal Ultrasound. The liver appeared diffusely echogenic.  No solid mass. OTHER TESTING:.   2/2013. ETOH negative. 8/2013:  DEXA scan - osteoporosis   10/2015. DEXA scan osteoporosis. 8/2018. TFTS. TSH 0.38/T3 free 3.4/T4 free 1.3    25 minutes total time spent with this patient with more than 50% of this time spent counseling and coordinating care as described above. FOLLOW-UP:  All of the issues listed above in the Assessment and Plan were discussed with the patient. All questions were answered. The patient expressed a clear understanding of the above. [address] in 3 months for routine monitoring. Continue monthly labs.       CELIA GreenbergP-ROZINA  [facility]  [address], [address]   [address], [address] [facility]   [phone]

## 2021-04-12 RX ORDER — FUROSEMIDE 40 MG/1
TABLET ORAL
Qty: 90 TAB | Refills: 0 | Status: SHIPPED | OUTPATIENT
Start: 2021-04-12 | End: 2021-07-14

## 2021-05-08 NOTE — PROGRESS NOTES
[address] [place] [address], MD, Colin Enriquez MD [place], ZAKI Muniz, [facility]     Marilyn Yung, [facility]   CELIA LoveP-ROZINA Mcrae, [facility]       Marybel Pryor [facility] [place] 136    at [address], [address] [place] [address], [facility] 22.    [phone]    FAX: [address] [place] [address]    at [address], [address], [address]    [phone]    FAX: [phone]       Patient Care Team:  Nadine Denson MD as PCP - General (Family Practice)  Toney Mora MD as Physician (Surgery)  Pradeep Reddy MD as Physician (Obstetrics & Gynecology)  Sahra Barnes MD as Physician (Neurosurgery)  Darvin Cantu DDS as Physician (Dental General Practice)  Pattie Castro O.D. as Physician (Optometry)  Juwan West RN as Nurse Navigator  Skye Peterson MD (Gastroenterology)  Nathan Maria MD (Internal Medicine)  Ernie Martin MD (Hematology and Oncology)  Tasha Chen DO (Rheumatology)  Carole Casey MD (Dermatology)        Problem List  Date Reviewed: 10/15/2019          Codes Class Noted    Long term current use of immunosuppressive drug ICD-10-CM: Z79.899  ICD-9-CM: V58.69  4/18/2018        H/O liver transplant [facility]) ICD-10-CM: Z94.4  ICD-9-CM: V42.7  44/69/4007        Complication of transplanted liver [facility]) ICD-10-CM: T86.40  ICD-9-CM: 996.82  11/13/2013        Back pain, lumbosacral ICD-10-CM: M54.5  ICD-9-CM: 724.2, 724.6  1/27/2013        Diabetes mellitus ([facility] ICD-10-CM: E11.9  ICD-9-CM: 250.00  5/28/2012        Colon polyps ICD-10-CM: K63.5  ICD-9-CM: 211.3  5/28/2012        Breast cancer ([facility] ICD-10-CM: C50.919  ICD-9-CM: 174.9  5/28/2012    Overview Signed 5/28/2012  7:00 AM by Luciana Mallory MD     Masectomy, chemotherapy,XRT. 1996  Tamoxifen [id number]               H/O shoulder surgery ICD-10-CM: Z98.890  ICD-9-CM: V45.89  5/28/2012    Overview Signed 5/28/2012  7:07 AM by Luciana Mallory MD     12/2011                   Glen Shanks returns to the [address] for management of liver allograft function, to adjust immune suppression. The active problem list, all pertinent past medical history, medications, liver histology, endoscopic studies, radiologic findings and laboratory findings related to the liver disorder were reviewed with the patient. The patient underwent liver transplantation at [place], [place] in 4/2013. The patient is currently receiving sirolimus cellcept and sirolimus for immune suppression. The patient had an acute graft rejection in 10/2017. She has a repeat liver biopsy because of persistent elevation in liver enzymes in 6/2018. This demonstrated steatosis. She had a post-LBX bleed into the liver. This appears to have resolved and the liver enzymes are now down to normal.    Had cone biopsy for cervical CA. Cervical CA removed with DNC in 06/18/2019 by Dr. Fady Florence. She has followed up with the practice and margins are clean. The patient has no symptoms which can be attributed to the liver disorder. The patient has not experienced fatigue, fevers, chills. The patient completes all daily activities without any functional limitations. All of the issues listed in the Assessment and Plan were discussed with the patient. All questions were answered. Since the last office appointment the patient has:  Had no changes in the liver disease. 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   The most recent laboratory studies indicate that the liver transaminases are normal, alkaline phosphatase is normal, tests of hepatic synthetic and metabolic function are normal, and the platelet count is normal.    A liver biopsy from 11/2017 demonstrated acute rejection. Rejection was treated with high dose steroids and taper. A liver biopsy in 6/2018 demonstrated fatty liver with no rejection. Continue labs every 4 weeks for now. Immune Suppression  The patient is receiving immune suppression with sirolimus which is being well tolerated at 1 mg/day. The immune suppression blood level is in the proper range at 3.8 in 02/2020. We will continue the Cellcept at 250 mg bid for now. Prednisone has been weaned off since 08/01/2018. NAFL  The most recent liver biopsy demonstrates NAFL. The patient was counseled regarding diet and exercise to achieve weight loss. The best diet for patients with fatty liver is one very low in carbohydrates and enriched with protein such as an [employer] program.    The patient was told not to consume any food or drinks products containing fructose as this enhances hepatic fat synthesis. Ultrasound ordered today to be performed prior to her next appointment here in 2 months. Acute and chronic kidney injury   This is a common adverse event of immune suppression. The Screat is normal.  Aspirin and NSAIDs should be avoided since these agents can worsen renal insufficiency. Hypercholesterolemia   This can be caused by immune suppression. Serum cholesterol is normal and does not need to be treated at this time. Hypertension   This is a common side effect of immune suppression. Blood pressure is well controlled on the current treatment. Low serum magnesium   This is a common side effect of immune suppression. The patient has a normal or near normal magnesium level and does not need supplementation. Osteoporosis   Osteoporosis is common in patients with cirhrosis prior to liver transplant. The patient had osteoporosis on DEXA scan from 2013 prior to the LT.   The patient still had osteoporosis on DEXA scan from 10/2015. The patient was advised to take calcium supplementation and Vitamin D. The patient should be treated with a bisphosphonate if the bone density does not improve. Now on Prolia since 11/14/2018. Had scan 06/2018. Monitoring for skin Cancer  The patient was counseled regarding increased risk of skin cancer in transplant recipients and need to have any new skin lesions evaluated by dermatology and removed if suspicious. The patient was instructed to see Dermatology annually examination. Vaccinations  Routine vaccinations against other bacterial and viral agents can be performed as long as this is with attentuatted virus. Live virus vaccines should not be administered. Annual flu vaccination should be administered. Has received flu vaccination 09/24/2019. ALLERGIES:  Allergies   Allergen Reactions    Tape [Adhesive] Other (comments)     Pulls skin off    Percocet [Oxycodone-Acetaminophen] Shortness of Breath, Itching and Other (comments)     \"Burning skin\"    Statins-Hmg-Coa Reductase Inhibitors Other (comments)     liver     MEDICATIONS:  Current Outpatient Medications   Medication Sig    ondansetron hcl (ZOFRAN) 4 mg tablet Take 1 Tab by mouth every eight (8) hours as needed for Nausea.  butalbital-aspirin-caffeine (FIORINAL) capsule butalbital-aspirin-caffeine 50 mg-325 mg-40 mg capsule    furosemide (LASIX) 40 mg tablet TAKE 1 TABLET BY MOUTH ONCE DAILY    lisinopril (PRINIVIL, ZESTRIL) 10 mg tablet Take 10 mg by mouth daily.  mycophenolate (CELLCEPT) 500 mg tablet TAKE 2 TABLETS BY MOUTH TWICE DAILY (Patient taking differently: TAKE 2 TABLETS BY MOUTH TWICE DAILY  Indications: taking differently, 250 mg bid)    pantoprazole (PROTONIX) 40 mg tablet pantoprazole 40 mg tablet,delayed release    denosumab (PROLIA) 60 mg/mL injection 60 mg by SubCUTAneous route.     sirolimus (RAPAMUNE) 1 mg tablet Take 4 tabs by mouth daily (Patient taking differently: Take 4 tabs by mouth daily  Indications: taking 2 mg/day)    insulin NPH/insulin regular (NOVOLIN 70/30, HUMULIN 70/30) 100 unit/mL (70-30) injection by SubCUTAneous route.  metoprolol tartrate (LOPRESSOR) 50 mg tablet Take 25 mg by mouth two (2) times a day.  cyproheptadine (PERIACTIN) 4 mg tablet Take 1 Tab by mouth three (3) times daily as needed. Indications: Pruritus of Skin    glimepiride (AMARYL) 4 mg tablet Take 4 mg by mouth daily.  ergocalciferol, vitamin D2, 2,000 unit tab Take 1 Tab by mouth. No current facility-administered medications for this visit. SYSTEM REVIEW NOT RELATED TO LIVER DISEASE OR REVIEWED ABOVE:  Constitution systems: Weight gain with steroids. Eyes: Negative for visual changes. ENT: Negative for sore throat, painful swallowing. Respiratory: Negative for cough, hemoptysis, SOB. Cardiology: Negative for chest pain, palpitations. GI:  Negative for constipation or diarrhea. : Negative for urinary frequency, dysuria, hematuria, nocturia. Skin: Negative for rash. Hematology: Negative for easy bruising, blood clots. Musculo-skelatal: Negative for muscle pain or weakness. Neurologic: Negative for headaches, dizziness, vertigo, memory problems not related to HE. Psychology: Negative for anxiety, depression. FAMILY HISTORY:  There is no family history of liver disease. SOCIAL HISTORY:  The patient is . There are 2 children and 2 grandchildren. The patient has never used tobacco products. The patient has never consumed significant amounts of alcohol. The patient used to work for [employer] and then as an  for [employer].  She has not worked since the liver transplant.     PHYSICAL EXAMINATION:  Visit Vitals  /49 (BP 1 Location: Left arm, BP Patient Position: Sitting)   Pulse 90   Temp 97.6 °F (36.4 °C) (Oral)   Resp 16   Ht 5' 6\" (1.676 m)   Wt 195 lb (88.5 kg)   SpO2 98%   BMI 31.47 kg/m²       General: Appears healthy. No acute distress. Eyes: Sclera anicteric. ENT: No oral lesions. Thyroid normal.  Nodes: No adenopathy. Skin: No spider angiomata. No jaundice. No palmar erythema. Respiratory: Lungs clear to auscultation. Cardiovascular: Regular heart rate. No murmurs. No JVD. Abdomen:   Well healed liver transplant incision. Soft non-tender. Liver size normal to percussion/palpation. Spleen not palpable. No obvious ascites. Extremities: No lower edema. No muscle wasting. Neurologic:  Alert and oriented. Cranial nerves grossly intact. No asterixsis. LAB STUDIES:  [facility] [place] [address] 11/4/2019 9/30/2019   WBC 4.0 - 11.0 K/uL 6.5 4.8   ANC 1.8 - 7.7 K/uL 3.1 2.2   HGB 11.7 - 16.1 g/dL 12.6 13.2    - 440 K/uL 250 238   INR 0.89 - 1.29 0.86 (L) 0.90   AST 10 - 37 U/L 30 46 (H)   ALT 5 - 40 U/L 38 69 (H)   Alk Phos 40 - 120 U/L 69 76   Bili, Total 0.2 - 1.2 mg/dL 0.2 0.3   Bili, Direct 0.0 - 0.3 mg/dL <0.2 <0.2   Albumin 3.5 - 5.0 g/dL 3.9 4.3   BUN 6 - 22 mg/dL 28 (H) 36 (H)   Creat 0.8 - 1.4 mg/dL 1.6 (H) 1.9 (H)   Na 133 - 145 mmol/L 145 140   K 3.5 - 5.5 mmol/L 5.8 (H) 5.2   Cl 98 - 110 mmol/L 108 100   CO2 20 - 32 mmol/L 21 24   Glucose 70 - 99 mg/dL 84 157 (H)   Magnesium 1.6 - 2.5 mg/dL 2.4 2.3     11/04/2019. Sirolimus: 2.7 ng/mL  09/30/2019. Sirolimus: 3.8 ng/mL    SEROLOGIES:  2/2012. HAV total negative, HBsAntigen negative, anti-HBcore negative, anti-HBsurface negative, anti-HCV negative, Ferritin 20, NEVA negative, ASMA negative, AMA negative, ceruloplsmin 34, alpha-1-antitrypsin 195, A1AT phenotype MM.  2/2013. Ferritin 434, ASMA weak positive, CMV IgG positive, EBV IgG positive, anti-HIV negative, HBA1C 5.1    LIVER HISTOLOGY:  11/2017. Slides reviewed by MLS. Acute graft rejection. 6/2018. Slides reviewed by MLS. NAFL. 40% macro and micovesicular steatosis. No ballooning. No inflammation. No fibrosis. No rejection. ENDOSCOPIC PROCEDURES:  1/2012. EGD by Dr Tamika Beckham. Esophageal varices. RADIOLOGY:  1/2012. CT scan abdomen with and without IV contrast.  Changes consistent with cirrhosis. No liver mass lesions. No dilated bile ducts. No bile duct strictures. Varices. Generalized ascites. 07/2012:  Ultrasound of the liver. Echogenic consistent with chronic liver disease, cirrhosis. No liver mass lesions. No ascites  11/2012: Ultrasound of the liver. Echogenic consistent with chronic liver disease, cirrhosis. No liver mass lesions. Small amount ascites present. 1/2013. Ultrasound of liver. Echogenic consistent with cirrhosis. No liver mass lesions. No dilated bile ducts. Mild ascites. 11/2013. Ultrasound of liver. Normal appearing liver. No liver mass lesions. 12/2013:  MRI of abdomen. Questionable small focal stenosis of distal common hepatic duct. Focal stenosis in mid-portal vein. 02/2015. Ultrasound of the liver. Consistent with cirrhosis. 06/2018. Ultrasound of the liver. Mixed echogenicity, complex collection in the medial right hepatic lobe most in keeping with hematoma. 05/2019. Pelvic MRI w/wo contrast.  Enhancing endocervical canal 1.5 cm lesion. OTHER TESTING:.   2/2013. ETOH negative. 8/2013:  DEXA scan - osteoporosis   10/2015. DEXA scan osteoporosis. 8/2018. TFTS. TSH 0.38/T3 free 3.4/T4 free 1.3    25 minutes total time spent with this patient with more than 50% of this time spent counseling and coordinating care as described above. [address] in 3 months. Continue labs every 4 weeks.       DARCIE Munguia  [facility] [place] of [address], [address] [place] [address]   [phone] no

## 2021-06-23 ENCOUNTER — OFFICE VISIT (OUTPATIENT)
Dept: HEMATOLOGY | Age: 66
End: 2021-06-23
Payer: MEDICARE

## 2021-06-23 VITALS
SYSTOLIC BLOOD PRESSURE: 95 MMHG | DIASTOLIC BLOOD PRESSURE: 54 MMHG | HEART RATE: 67 BPM | WEIGHT: 202 LBS | OXYGEN SATURATION: 98 % | BODY MASS INDEX: 32.47 KG/M2 | HEIGHT: 66 IN | TEMPERATURE: 98 F | RESPIRATION RATE: 17 BRPM

## 2021-06-23 DIAGNOSIS — Z94.4 S/P LIVER TRANSPLANT (HCC): Primary | ICD-10-CM

## 2021-06-23 PROCEDURE — G8432 DEP SCR NOT DOC, RNG: HCPCS | Performed by: NURSE PRACTITIONER

## 2021-06-23 PROCEDURE — 1090F PRES/ABSN URINE INCON ASSESS: CPT | Performed by: NURSE PRACTITIONER

## 2021-06-23 PROCEDURE — G8427 DOCREV CUR MEDS BY ELIG CLIN: HCPCS | Performed by: NURSE PRACTITIONER

## 2021-06-23 PROCEDURE — 1101F PT FALLS ASSESS-DOCD LE1/YR: CPT | Performed by: NURSE PRACTITIONER

## 2021-06-23 PROCEDURE — G0463 HOSPITAL OUTPT CLINIC VISIT: HCPCS | Performed by: NURSE PRACTITIONER

## 2021-06-23 PROCEDURE — G8536 NO DOC ELDER MAL SCRN: HCPCS | Performed by: NURSE PRACTITIONER

## 2021-06-23 PROCEDURE — 99214 OFFICE O/P EST MOD 30 MIN: CPT | Performed by: NURSE PRACTITIONER

## 2021-06-23 PROCEDURE — G8400 PT W/DXA NO RESULTS DOC: HCPCS | Performed by: NURSE PRACTITIONER

## 2021-06-23 PROCEDURE — G8417 CALC BMI ABV UP PARAM F/U: HCPCS | Performed by: NURSE PRACTITIONER

## 2021-06-23 PROCEDURE — 3017F COLORECTAL CA SCREEN DOC REV: CPT | Performed by: NURSE PRACTITIONER

## 2021-06-23 RX ORDER — MYCOPHENOLATE MOFETIL 500 MG/1
250 TABLET ORAL 2 TIMES DAILY
Qty: 90 TABLET | Refills: 3 | Status: SHIPPED | OUTPATIENT
Start: 2021-06-23 | End: 2021-08-12

## 2021-06-23 RX ORDER — INSULIN ASPART 100 [IU]/ML
INJECTION, SOLUTION INTRAVENOUS; SUBCUTANEOUS
COMMUNITY

## 2021-06-23 RX ORDER — MYCOPHENOLATE MOFETIL 500 MG/1
250 TABLET ORAL 2 TIMES DAILY
COMMUNITY
End: 2021-06-23

## 2021-06-23 NOTE — PROGRESS NOTES
Wolf Kruger MD, Lara Colin MD Arvilla Nones, PADONY Gregory, [facility]     April RICCARDO GarciaAga, [facility]   Annalise Castrejon P-ROZINA Mariee, [facility]       Marybel Deputado Reece De Harvey 136    at [address], [address] [place] [address] Kimberley Juares  22.    [phone]    FAX: [address] [place] [address], [address]    [phone]    FAX: [phone]       Patient Care Team:  Maeve Heaton MD as PCP - General (Family Medicine)  Humza Monterroso MD as Physician (Surgery)  Saumya Villegas MD as Physician (Obstetrics & Gynecology)  Patricio Smith MD as Physician (Neurosurgery)  Amie Blackmon DDS as Physician (Dental General Practice)  Caleb Johnston O.D. as Physician (Optometry)  Kalin Amaro RN as Nurse Navigator  Amish Barba MD (Gastroenterology)  Kinga Cr MD (Internal Medicine)  Mary Alice Mcbride MD (Hematology and Oncology)  Jam Gonzalez DO (Rheumatology)  Frantz Zaman MD (Dermatology)  Jahaira Peralta MD (Endocrinology)  Chuck Mendieta (Podiatry)        Problem List  Date Reviewed: 3/15/2021        Codes Class Noted    Long term current use of immunosuppressive drug ICD-10-CM: Z79.899  ICD-9-CM: V58.69  4/18/2018        H/O liver transplant [facility]) ICD-10-CM: Z94.4  ICD-9-CM: V42.7  95/92/3063        Complication of transplanted liver [facility]) ICD-10-CM: T86.40  ICD-9-CM: 996.82  11/13/2013        Back pain, lumbosacral ICD-10-CM: M54.5  ICD-9-CM: 724.2, 724.6  1/27/2013        Diabetes mellitus ([facility] ICD-10-CM: E11.9  ICD-9-CM: 250.00  5/28/2012        Colon polyps ICD-10-CM: K63.5  ICD-9-CM: 211.3  5/28/2012        Breast cancer ([facility].) ICD-10-CM: C50.919  ICD-9-CM: 174.9  5/28/2012    Overview Signed 5/28/2012  7:00 AM by Silke Patel MD     Masectomy, chemotherapy,XRT. 1996  Tamoxifen [id number]               H/O shoulder surgery ICD-10-CM: Z98.890  ICD-9-CM: V45.89  5/28/2012    Overview Signed 5/28/2012  7:07 AM by Silke Patel MD     12/2011                   Modesta Hart returns to the [facility] for management of liver allograft function, to adjust immune suppression. The active problem list, all pertinent past medical history, medications, liver histology, endoscopic studies, radiologic findings and laboratory findings related to the liver disorder were reviewed with the patient. The patient underwent liver transplantation at [place], [place] in 4/2013. The patient is currently receiving sirolimus, cellcept for immune suppression. The patient had an acute graft rejection in 10/2017. She has a repeat liver biopsy because of persistent elevation in liver enzymes in 6/2018. This demonstrated steatosis. She had a post-LBX bleed into the liver. This appears to have resolved and the liver enzymes are now down to normal.    Had cone biopsy for cervical CA. Cervical CA removed with DNC in 06/18/2019 by Dr. Lara Whitehead. She has followed up with the practice and margins are clean. The patient has no symptoms which can be attributed to the liver disorder. The patient has not experienced fatigue, fevers, chills. The patient completes all daily activities without any functional limitations. All of the issues listed in the Assessment and Plan were discussed with the patient. All questions were answered. Since the last office appointment:  Seeing a new endocrinologist.  Silvino Verde 03/09/2021. Now using \"omnipod\" to regulate blood sugars. Had labs. Fractured multiple bones in her right foot when she fell in her yard on 05/01/2021.   Now in walking boot. 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 The date of the liver transplant was 4/2013    The most recent laboratory studies indicate that the liver transaminases are normal, alkaline phosphatase is normal, tests of hepatic synthetic and metabolic function are normal, and the platelet count is normal.      A liver biopsy from 11/2017 demonstrated acute rejection. Rejection was treated with high dose steroids and taper. A liver biopsy in 6/2018 demonstrated fatty liver with no rejection. Immune Suppression  The patient is receiving immune suppression with sirolimus which is being well tolerated at 1 mg/day. The immune suppression blood level. 3.8 in 06/2021. We will continue the Cellcept at 250 mg bid for now. NAFL  The diagnosis was made by liver biopsy in 6/2018. The need to perform an assessment of liver fibrosis was discussed with the patient. The Fibroscan can assess liver fibrosis and determine if a patient has advanced fibrosis or cirrhosis without the need for liver biopsy. This indicates normal to mild hepatic fibrosis, Metavir fibrosis score of F1. The Fibroscan can be repeated annually or as often as clinically indicated to assess for fibrosis progression and/or regression. The patient was counseled regarding diet and exercise to achieve weight loss. The best diet for patients with fatty liver is one very low in carbohydrates and enriched with protein such as an Martha's program.    The patient was told not to consume any food or drinks products containing fructose as this enhances hepatic fat synthesis. Acute and chronic kidney injury   This is a common adverse event of immune suppression. The Screat is stable in the 1.6 mg range. Aspirin and NSAIDs should be avoided since these agents can worsen renal insufficiency. Hypercholesterolemia   This can be caused by immune suppression.     Serum cholesterol is normal and does not need to be treated at this time. Hypertension   This is a common side effect of immune suppression. Blood pressure is well controlled on the current treatment. Low serum magnesium   This is a common side effect of immune suppression. The patient has a normal magnesium level and does not need supplementation. Osteoporosis   Osteoporosis is common in patients with cirhrosis prior to liver transplant. The patient had osteoporosis on DEXA scan from 2013 prior to the LT. The patient still had osteoporosis on DEXA scan from 10/2015. The patient was advised to take calcium supplementation and Vitamin D. The patient is taking prolia since 11/14/2018. The most recent DEXA in 7/2020 still showed osteoporosis. Had most recent injection on 05/18/2021. Monitoring for skin Cancer  The patient was counseled regarding increased risk of skin cancer in transplant recipients and need to have any new skin lesions evaluated by dermatology and removed if suspicious. The patient was instructed to see Dermatology annually examination. Colon polyps  The patient has had colon polyps in the past.  The last colonoscopy was in 2016 with Dr Fayetta Romberg. Most recent 12/17/2020. Clear    Vaccinations  Routine vaccinations against other bacterial and viral agents can be performed as long as this is with attentuatted virus. Live virus vaccines should not be administered. Annual flu vaccination should be administered. ALLERGIES:  Allergies   Allergen Reactions    Tape [Adhesive] Other (comments)     Pulls skin off    Percocet [Oxycodone-Acetaminophen] Shortness of Breath, Itching and Other (comments)     \"Burning skin\"    Statins-Hmg-Coa Reductase Inhibitors Other (comments)     liver     MEDICATIONS:  Current Outpatient Medications   Medication Sig    insulin aspart U-100 (NovoLOG Flexpen U-100 Insulin) 100 unit/mL (3 mL) inpn by SubCUTAneous route.     furosemide (LASIX) 40 mg tablet Take 1 tablet by mouth once daily    mycophenolate (CELLCEPT) 500 mg tablet TAKE 2 TABLETS BY MOUTH TWICE DAILY    sirolimus (RAPAMUNE) 1 mg tablet Take 4 tabs by mouth daily    ondansetron (ZOFRAN ODT) 4 mg disintegrating tablet Take 1 Tab by mouth every three to four (3-4) hours as needed for Nausea or Vomiting.  butalbital-aspirin-caffeine (FIORINAL) capsule butalbital-aspirin-caffeine 50 mg-325 mg-40 mg capsule    lisinopril (PRINIVIL, ZESTRIL) 10 mg tablet Take 10 mg by mouth daily.  pantoprazole (PROTONIX) 40 mg tablet pantoprazole 40 mg tablet,delayed release    denosumab (PROLIA) 60 mg/mL injection 60 mg by SubCUTAneous route.  insulin NPH/insulin regular (NOVOLIN 70/30, HUMULIN 70/30) 100 unit/mL (70-30) injection by SubCUTAneous route. (Patient not taking: Reported on 6/23/2021)    metoprolol tartrate (LOPRESSOR) 50 mg tablet Take 25 mg by mouth two (2) times a day. No current facility-administered medications for this visit. SYSTEM REVIEW NOT RELATED TO LIVER DISEASE OR REVIEWED ABOVE:  Constitution systems: Weight gain with steroids. Eyes: Negative for visual changes. ENT: Negative for sore throat, painful swallowing. Respiratory: Negative for cough, hemoptysis, SOB. Cardiology: Negative for chest pain, palpitations. GI:  Negative for constipation or diarrhea. : Negative for urinary frequency, dysuria, hematuria, nocturia. Skin: Negative for rash. Hematology: Negative for easy bruising, blood clots. Musculo-skelatal: Negative for muscle pain or weakness. Neurologic: Negative for headaches, dizziness, vertigo, memory problems not related to HE. Psychology: Negative for anxiety, depression. FAMILY HISTORY:  There is no family history of liver disease. SOCIAL HISTORY:  The patient is . There are 2 children and 2 grandchildren. The patient has never used tobacco products.     The patient has never consumed significant amounts of alcohol. The patient used to work for [employer] and then as an  for [employer].  She has not worked since the liver transplant. PHYSICAL EXAMINATION:  Visit Vitals  BP (!) 95/54 (BP 1 Location: Left upper arm, BP Patient Position: Sitting, BP Cuff Size: Large adult)   Pulse 67   Temp 98 °F (36.7 °C)   Resp 17   Ht 5' 6\" (1.676 m)   Wt 202 lb (91.6 kg)   SpO2 98%   BMI 32.60 kg/m²       General: Appears healthy. No acute distress. Eyes: Sclera anicteric. ENT: No oral lesions. Thyroid normal.  Nodes: No adenopathy. Skin: No spider angiomata. No jaundice. No palmar erythema. Respiratory: Lungs clear to auscultation. Cardiovascular: Regular heart rate. No murmurs. No JVD. Abdomen:   Well healed liver transplant incision. Soft non-tender. Liver size normal to percussion/palpation. Spleen not palpable. No obvious ascites. Extremities: No lower edema. No muscle wasting. Neurologic:  Alert and oriented. Cranial nerves grossly intact. No asterixsis. LAB STUDIES:  From 2021 [facility]):  AST/ ALT/ ALP/ T. BILI/ ALB: 28/ 22/ 77/ 0.2/ 3.7  BUN/ CRT/ PLT:  16/ 1.5/ 223  MA.4  SIR:  3.8    From 2021 [facility]):  AST/ ALT/ ALP/ T. BILI/ ALB: 46/ 52/ 67/ 0.3/ 3.9  BUN/ CRT/ PLT: 32/ 1.9/ 249  MA.3  SIR: 4.1    From 2021 [facility]):  AST/ ALT/ ALP/ T. BILI/ ALB: 50/ 49/ 63/ 0.2  BUN/ CRT/ PLT:  19/ 1.4/ 249/  MA.3  SIR: 4.5    From 2021 [facility]):  AST/ ALT/ ALP/ T. BILI/ ALB:   BUN/ CRT/ PLT: 18/ 1.4/ 235  MA.4  SIR:  5.0    From 2020 [facility]):  AST/ ALT/ ALP/ T. BILI/ ALB: 34/ 53/ 72/ 0.3/ 3.9  BUN/ CRT/ PLT: 41/ 1.8/ 273  MA.6  SIR: 4.0    SEROLOGIES:  2012. HAV total negative, HBsAntigen negative, anti-HBcore negative, anti-HBsurface negative, anti-HCV negative, Ferritin 20, NEVA negative, ASMA negative, AMA negative, ceruloplsmin 34, alpha-1-antitrypsin 195, A1AT phenotype MM.  2013.   Ferritin 434, ASMA weak positive, CMV IgG positive, EBV IgG positive, anti-HIV negative, HBA1C 5.1    LIVER HISTOLOGY:  11/2017. Slides reviewed by MLS. Acute graft rejection. 6/2018. Slides reviewed by MLS. NAFL. 40% macro and micovesicular steatosis. No ballooning. No inflammation. No fibrosis. No rejection. ENDOSCOPIC PROCEDURES:  1/2012. EGD by Dr Mayra Espino. Esophageal varices. RADIOLOGY:  1/2012. CT scan abdomen with and without IV contrast.  Changes consistent with cirrhosis. No liver mass lesions. No dilated bile ducts. No bile duct strictures. Varices. Generalized ascites. 07/2012:  Ultrasound of the liver. Echogenic consistent with chronic liver disease, cirrhosis. No liver mass lesions. No ascites  11/2012: Ultrasound of the liver. Echogenic consistent with chronic liver disease, cirrhosis. No liver mass lesions. Small amount ascites present. 1/2013. Ultrasound of liver. Echogenic consistent with cirrhosis. No liver mass lesions. No dilated bile ducts. Mild ascites. 11/2013. Ultrasound of liver. Normal appearing liver. No liver mass lesions. 12/2013:  MRI of abdomen. Questionable small focal stenosis of distal common hepatic duct. Focal stenosis in mid-portal vein. 02/2015. Ultrasound of the liver. Consistent with cirrhosis. 06/2018. Ultrasound of the liver. Mixed echogenicity, complex collection in the medial right hepatic lobe most in keeping with hematoma. 05/2019. Pelvic MRI w/wo contrast.  Enhancing endocervical canal 1.5 cm lesion. 02/2020. Abdominal Ultrasound. The liver appeared diffusely echogenic.  No solid mass. OTHER TESTING:.   2/2013. ETOH negative. 8/2013:  DEXA scan - osteoporosis   10/2015. DEXA scan osteoporosis. 8/2018. TFTS. TSH 0.38/T3 free 3.4/T4 free 1.3    25 minutes total time spent with this patient with more than 50% of this time spent counseling and coordinating care as described above.      FOLLOW-UP:  All of the issues listed above in the Assessment and Plan were discussed with the patient. All questions were answered. The patient expressed a clear understanding of the above. [address] in 3 months for routine monitoring.      LUCHO Fink-C  [facility] [place] of [address], [address] [place] [address]   [phone]

## 2021-07-14 RX ORDER — FUROSEMIDE 40 MG/1
TABLET ORAL
Qty: 90 TABLET | Refills: 0 | Status: SHIPPED | OUTPATIENT
Start: 2021-07-14 | End: 2021-10-06 | Stop reason: SDUPTHER

## 2021-08-12 RX ORDER — MYCOPHENOLATE MOFETIL 500 MG/1
TABLET ORAL
Qty: 360 TABLET | Refills: 3 | Status: SHIPPED | OUTPATIENT
Start: 2021-08-12 | End: 2022-09-12

## 2021-08-12 RX ORDER — MYCOPHENOLATE MOFETIL 500 MG/1
TABLET ORAL
Qty: 120 TABLET | Refills: 0 | Status: SHIPPED | OUTPATIENT
Start: 2021-08-12 | End: 2021-08-12 | Stop reason: SDUPTHER

## 2021-09-08 DIAGNOSIS — Z94.4 S/P LIVER TRANSPLANT (HCC): Primary | ICD-10-CM

## 2021-10-06 RX ORDER — FUROSEMIDE 40 MG/1
40 TABLET ORAL DAILY
Qty: 90 TABLET | Refills: 0 | Status: SHIPPED | OUTPATIENT
Start: 2021-10-06 | End: 2021-10-28

## 2021-10-11 RX ORDER — FUROSEMIDE 40 MG/1
TABLET ORAL
Qty: 90 TABLET | Refills: 0 | Status: SHIPPED | OUTPATIENT
Start: 2021-10-11 | End: 2021-10-28

## 2021-10-28 ENCOUNTER — OFFICE VISIT (OUTPATIENT)
Dept: HEMATOLOGY | Age: 66
End: 2021-10-28
Payer: MEDICARE

## 2021-10-28 VITALS
BODY MASS INDEX: 32.6 KG/M2 | HEART RATE: 74 BPM | TEMPERATURE: 97.4 F | WEIGHT: 202 LBS | DIASTOLIC BLOOD PRESSURE: 67 MMHG | OXYGEN SATURATION: 98 % | SYSTOLIC BLOOD PRESSURE: 137 MMHG

## 2021-10-28 DIAGNOSIS — Z94.4 S/P LIVER TRANSPLANT (HCC): Primary | ICD-10-CM

## 2021-10-28 PROCEDURE — 1101F PT FALLS ASSESS-DOCD LE1/YR: CPT | Performed by: NURSE PRACTITIONER

## 2021-10-28 PROCEDURE — G8417 CALC BMI ABV UP PARAM F/U: HCPCS | Performed by: NURSE PRACTITIONER

## 2021-10-28 PROCEDURE — G8432 DEP SCR NOT DOC, RNG: HCPCS | Performed by: NURSE PRACTITIONER

## 2021-10-28 PROCEDURE — G8427 DOCREV CUR MEDS BY ELIG CLIN: HCPCS | Performed by: NURSE PRACTITIONER

## 2021-10-28 PROCEDURE — 3017F COLORECTAL CA SCREEN DOC REV: CPT | Performed by: NURSE PRACTITIONER

## 2021-10-28 PROCEDURE — 99214 OFFICE O/P EST MOD 30 MIN: CPT | Performed by: NURSE PRACTITIONER

## 2021-10-28 PROCEDURE — G8536 NO DOC ELDER MAL SCRN: HCPCS | Performed by: NURSE PRACTITIONER

## 2021-10-28 PROCEDURE — 1090F PRES/ABSN URINE INCON ASSESS: CPT | Performed by: NURSE PRACTITIONER

## 2021-10-28 PROCEDURE — G0463 HOSPITAL OUTPT CLINIC VISIT: HCPCS | Performed by: NURSE PRACTITIONER

## 2021-10-28 PROCEDURE — G8400 PT W/DXA NO RESULTS DOC: HCPCS | Performed by: NURSE PRACTITIONER

## 2021-10-28 RX ORDER — FUROSEMIDE 40 MG/1
40 TABLET ORAL DAILY
Qty: 90 TABLET | Refills: 3 | Status: SHIPPED | OUTPATIENT
Start: 2021-10-28

## 2021-10-28 NOTE — PROGRESS NOTES
[address] [place] [address], Mariya NELSON, MD Saul Monge, ZAKI Gamino, [facility]     Marilyn Yung, [facility]   Russ Madrid, FNP-ROZINA Lara, [facility]       Marybel Pryor Reece [address]    at [address], [address] [place] [address], [facility] 22.    [phone]    FAX: [address] [place] [address]    at [address], [address], [address]    [phone]    FAX: [phone]       Patient Care Team:  Loni Guerrero MD as PCP - General (Family Medicine)  Kamron Godwin MD as Physician (Surgery)  Eulalia Oswald MD as Physician (Obstetrics & Gynecology)  Fareed Harrington MD as Physician (Neurosurgery)  Genevieve Townsend DDS as Physician (Dental General Practice)  Adron Lesch, O.D. as Physician (Optometry)  Vangie Rodríguez RN as Nurse Navigator  Perlita Marcelino MD (Gastroenterology)  Es Butterfield MD (Internal Medicine)  Ioana Lockett MD (Hematology and Oncology)  Humberto Batres DO (Rheumatology)  Lisa Nugent MD (Dermatology)  Akanksha Rojas MD (Endocrinology)  Martir Carrillo DPM (Podiatry)  Marty Desouza, SHARON as Care Coordinator (Hepatology)        Problem List  Date Reviewed: 6/23/2021        Codes Class Noted    Long term current use of immunosuppressive drug ICD-10-CM: Z79.899  ICD-9-CM: V58.69  4/18/2018        H/O liver transplant [facility]) ICD-10-CM: Z94.4  ICD-9-CM: V42.7  10/01/7942        Complication of transplanted liver ([facility] ICD-10-CM: T86.40  ICD-9-CM: 996.82  11/13/2013        Back pain, lumbosacral ICD-10-CM: M54.50  ICD-9-CM: 724.2, 724.6  1/27/2013        Diabetes mellitus ([facility].) ICD-10-CM: E11.9  ICD-9-CM: 250.00  5/28/2012        Colon polyps ICD-10-CM: D74.4  ICD-9-CM: 211.3  5/28/2012        Breast cancer ([facility] ICD-10-CM: C50.919  ICD-9-CM: 174.9  5/28/2012    Overview Signed 5/28/2012  7:00 AM by Osman Hall MD     Masectomy, chemotherapy,XRT. 1996  Tamoxifen [id number]               H/O shoulder surgery ICD-10-CM: Z98.890  ICD-9-CM: V45.89  5/28/2012    Overview Signed 5/28/2012  7:07 AM by Osman Hall MD     12/2011                   Paola Cowan returns to the [facility] of [facility] for management of liver allograft function, to adjust immune suppression. The active problem list, all pertinent past medical history, medications, liver histology, endoscopic studies, radiologic findings and laboratory findings related to the liver disorder were reviewed with the patient. The patient underwent liver transplantation at [place], [place] in 4/2013. The patient is currently receiving sirolimus, cellcept for immune suppression. The patient had an acute graft rejection in 10/2017. She has a repeat liver biopsy because of persistent elevation in liver enzymes in 6/2018. This demonstrated steatosis. She had a post-LBX bleed into the liver. This has resolved and the liver enzymes are now down to normal.    Had cone biopsy for cervical CA. Cervical CA removed with DNC in 06/18/2019 by Dr. Naya Guerrier. She has followed up with the practice and margins are clean. The patient has no symptoms which can be attributed to the liver disorder. The patient has not experienced fatigue, fevers, chills. The patient completes all daily activities without any functional limitations. All of the issues listed in the Assessment and Plan were discussed with the patient. All questions were answered. Since the last office appointment:  Seeing a new endocrinologist, Dr. Jeff Mosley. Now using \"omnipod\" to regulate blood sugars. Had labs.     Fractured bones in her right foot when she fell in her yard on 05/01/2021 are better. She is walking unassisted. \"I finally got a regular shoe on today\". 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 The date of the liver transplant was 4/2013    The most recent laboratory studies indicate that the liver transaminases are normal, alkaline phosphatase is normal, tests of hepatic synthetic and metabolic function are normal, and the platelet count is normal.      A liver biopsy from 11/2017 demonstrated acute rejection. Rejection was treated with high dose steroids and taper. A liver biopsy in 6/2018 demonstrated fatty liver with no rejection. Immune Suppression  The patient is receiving immune suppression with sirolimus which is being well tolerated at 1 mg/day. The immune suppression blood level is in the proper range. Sirolimus was 4.3 ng/ml on 10/15/2021. We will continue the Cellcept at 250 mg bid for now. NAFL  The diagnosis was made by liver biopsy in 6/2018. The need to perform an assessment of liver fibrosis was discussed with the patient. The Fibroscan can assess liver fibrosis and determine if a patient has advanced fibrosis or cirrhosis without the need for liver biopsy. This indicates normal to mild hepatic fibrosis, Metavir fibrosis score of F1. The Fibroscan can be repeated annually or as often as clinically indicated to assess for fibrosis progression and/or regression. The patient was counseled regarding diet and exercise to achieve weight loss. The best diet for patients with fatty liver is one very low in carbohydrates and enriched with protein such as an Martha's program.    The patient was told not to consume any food or drinks products containing fructose as this enhances hepatic fat synthesis. Acute and chronic kidney injury   This is a common adverse event of immune suppression. The Screat is stable in the 1.6 mg range.     Aspirin and NSAIDs should be avoided since these agents can worsen renal insufficiency. Hypercholesterolemia   This can be caused by immune suppression. Serum cholesterol is normal and does not need to be treated at this time. Hypertension   This is a common side effect of immune suppression. Blood pressure is well controlled on the current treatment. Low serum magnesium   This is a common side effect of immune suppression. The patient has a normal magnesium level and does not need supplementation. Osteoporosis   Osteoporosis is common in patients with cirhrosis prior to liver transplant. The patient had osteoporosis on DEXA scan from 2013 prior to the LT. The patient still had osteoporosis on DEXA scan from 10/2015. The patient was advised to take calcium supplementation and Vitamin D. The patient is taking prolia since 11/14/2018. The most recent DEXA in 7/2020 still showed osteoporosis. Had most recent injection on 05/18/2021. Monitoring for skin Cancer  The patient was counseled regarding increased risk of skin cancer in transplant recipients and need to have any new skin lesions evaluated by dermatology and removed if suspicious. The patient was instructed to see Dermatology annually examination. Colon polyps  The patient has had colon polyps in the past.  The last colonoscopy was in 2016 with Dr Barrett Palacios. Most recent 12/17/2020. Clear    Vaccinations  Routine vaccinations against other bacterial and viral agents can be performed as long as this is with attentuatted virus. Live virus vaccines should not be administered. Annual flu vaccination should be administered.     ALLERGIES:  Allergies   Allergen Reactions    Tape [Adhesive] Other (comments)     Pulls skin off    Percocet [Oxycodone-Acetaminophen] Shortness of Breath, Itching and Other (comments)     \"Burning skin\"    Statins-Hmg-Coa Reductase Inhibitors Other (comments)     liver     MEDICATIONS:  Current Outpatient Medications   Medication Sig    furosemide (LASIX) 40 mg tablet Take 1 tablet by mouth once daily    furosemide (LASIX) 40 mg tablet Take 1 Tablet by mouth daily.  mycophenolate (CELLCEPT) 500 mg tablet Take 2 tablets by mouth twice daily    insulin aspart U-100 (NovoLOG Flexpen U-100 Insulin) 100 unit/mL (3 mL) inpn by SubCUTAneous route.  sirolimus (RAPAMUNE) 1 mg tablet Take 4 tabs by mouth daily    ondansetron (ZOFRAN ODT) 4 mg disintegrating tablet Take 1 Tab by mouth every three to four (3-4) hours as needed for Nausea or Vomiting.  butalbital-aspirin-caffeine (FIORINAL) capsule butalbital-aspirin-caffeine 50 mg-325 mg-40 mg capsule    lisinopril (PRINIVIL, ZESTRIL) 10 mg tablet Take 10 mg by mouth daily.  pantoprazole (PROTONIX) 40 mg tablet pantoprazole 40 mg tablet,delayed release    denosumab (PROLIA) 60 mg/mL injection 60 mg by SubCUTAneous route.  metoprolol tartrate (LOPRESSOR) 50 mg tablet Take 25 mg by mouth two (2) times a day. No current facility-administered medications for this visit. SYSTEM REVIEW NOT RELATED TO LIVER DISEASE OR REVIEWED ABOVE:  Constitution systems: Weight gain with steroids. Eyes: Negative for visual changes. ENT: Negative for sore throat, painful swallowing. Respiratory: Negative for cough, hemoptysis, SOB. Cardiology: Negative for chest pain, palpitations. GI:  Negative for constipation or diarrhea. : Negative for urinary frequency, dysuria, hematuria, nocturia. Skin: Negative for rash. Hematology: Negative for easy bruising, blood clots. Musculo-skelatal: Negative for muscle pain or weakness. Neurologic: Negative for headaches, dizziness, vertigo, memory problems not related to HE. Psychology: Negative for anxiety, depression. FAMILY HISTORY:  There is no family history of liver disease. SOCIAL HISTORY:  The patient is . There are 2 children and 2 grandchildren. The patient has never used tobacco products.     The patient has never consumed significant amounts of alcohol. The patient used to work for [employer] and then as an  for [employer].  She has not worked since the liver transplant. PHYSICAL EXAMINATION:  Visit Vitals  /67   Pulse 74   Temp 97.4 °F (36.3 °C) (Tympanic)   Wt 202 lb (91.6 kg)   SpO2 98%   BMI 32.60 kg/m²       General: Appears healthy. No acute distress. Eyes: Sclera anicteric. ENT: No oral lesions. Thyroid normal.  Nodes: No adenopathy. Skin: No spider angiomata. No jaundice. No palmar erythema. Respiratory: Lungs clear to auscultation. Cardiovascular: Regular heart rate. No murmurs. No JVD. Abdomen:   Well healed liver transplant incision. Soft non-tender. Liver size normal to percussion/palpation. Spleen not palpable. No obvious ascites. Extremities: No lower edema. No muscle wasting. Neurologic:  Alert and oriented. Cranial nerves grossly intact. No asterixsis. LAB STUDIES:  From 10/2021 DR [facility]):  AST/ ALT/ ALP/ T. BILI/ ALB:  24/ 21/ 73/ 0.3/ 3.7  BUN/ CRT/ PLT:  21/ 1.4/ 240  MA.2  SIR:  4.3    From 2021 DR [facility]):  AST/ ALT/ ALP/ T. BILI/ ALB: 28/ 22/ 77/ 0.2/ 3.7  BUN/ CRT/ PLT:  16/ 1.5/ 223  MA.4  SIR:  3.8    From 2021 DR [facility]):  AST/ ALT/ ALP/ T. BILI/ ALB: 46/ 52/ 67/ 0.3/ 3.9  BUN/ CRT/ PLT: 32/ 1.9/ 249  MA.3  SIR: 4.1    From 2021 DR [facility]NS[facility]):  AST/ ALT/ ALP/ T. BILI/ ALB: 50/ 49/ 63/ 0.2  BUN/ CRT/ PLT:  19/ 1.4/ 249/  MA.3  SIR: 4.5    From 2021 DR [facility]NS[facility]):  AST/ ALT/ ALP/ T. BILI/ ALB:   BUN/ CRT/ PLT: 18/ 1.4/ 235  MA.4  SIR:  5.0    From 2020 DR ELIZABETH JACOBS [facility]):  AST/ ALT/ ALP/ T. BILI/ ALB: 34/ 53/ 72/ 0.3/ 3.9  BUN/ CRT/ PLT: 41/ 1.8/ 273  MA.6  SIR: 4.0    SEROLOGIES:  2012.   HAV total negative, HBsAntigen negative, anti-HBcore negative, anti-HBsurface negative, anti-HCV negative, Ferritin 20, NEVA negative, ASMA negative, AMA negative, ceruloplsmin 34, alpha-1-antitrypsin 195, A1AT phenotype MM.  2/2013. Ferritin 434, ASMA weak positive, CMV IgG positive, EBV IgG positive, anti-HIV negative, HBA1C 5.1    LIVER HISTOLOGY:  11/2017. Slides reviewed by MLS. Acute graft rejection. 6/2018. Slides reviewed by MLS. NAFL. 40% macro and micovesicular steatosis. No ballooning. No inflammation. No fibrosis. No rejection. ENDOSCOPIC PROCEDURES:  1/2012. EGD by Dr Adilson Foy. Esophageal varices. RADIOLOGY:  1/2012. CT scan abdomen with and without IV contrast.  Changes consistent with cirrhosis. No liver mass lesions. No dilated bile ducts. No bile duct strictures. Varices. Generalized ascites. 07/2012:  Ultrasound of the liver. Echogenic consistent with chronic liver disease, cirrhosis. No liver mass lesions. No ascites  11/2012: Ultrasound of the liver. Echogenic consistent with chronic liver disease, cirrhosis. No liver mass lesions. Small amount ascites present. 1/2013. Ultrasound of liver. Echogenic consistent with cirrhosis. No liver mass lesions. No dilated bile ducts. Mild ascites. 11/2013. Ultrasound of liver. Normal appearing liver. No liver mass lesions. 12/2013:  MRI of abdomen. Questionable small focal stenosis of distal common hepatic duct. Focal stenosis in mid-portal vein. 02/2015. Ultrasound of the liver. Consistent with cirrhosis. 06/2018. Ultrasound of the liver. Mixed echogenicity, complex collection in the medial right hepatic lobe most in keeping with hematoma. 05/2019. Pelvic MRI w/wo contrast.  Enhancing endocervical canal 1.5 cm lesion. 02/2020. Abdominal Ultrasound. The liver appeared diffusely echogenic.  No solid mass. OTHER TESTING:.   2/2013. ETOH negative. 8/2013:  DEXA scan - osteoporosis   10/2015. DEXA scan osteoporosis. 8/2018. TFTS. TSH 0.38/T3 free 3.4/T4 free 1.3    25 minutes total time spent with this patient with more than 50% of this time spent counseling and coordinating care as described above. FOLLOW-UP:  All of the issues listed above in the Assessment and Plan were discussed with the patient. All questions were answered. The patient expressed a clear understanding of the above. [address] in 3 months for routine monitoring.      LUCHO Santana-ROZINA  [facility] of [address], [address] [place] [address], [address] [place] [address]   [phone]

## 2021-11-11 LAB
A-G RATIO,AGRAT: 1.4 RATIO (ref 1.1–2.6)
ABSOLUTE LYMPHOCYTE COUNT, 10803: 2.2 K/UL (ref 1–4.8)
ALBUMIN SERPL-MCNC: 3.7 G/DL (ref 3.5–5)
ALP SERPL-CCNC: 67 U/L (ref 40–120)
ALT SERPL-CCNC: 18 U/L (ref 5–40)
ANION GAP SERPL CALC-SCNC: 12 MMOL/L (ref 3–15)
AST SERPL W P-5'-P-CCNC: 23 U/L (ref 10–37)
BASOPHILS # BLD: 0 K/UL (ref 0–0.2)
BASOPHILS NFR BLD: 0 % (ref 0–2)
BILIRUB SERPL-MCNC: 0.2 MG/DL (ref 0.2–1.2)
BILIRUBIN, DIRECT,CBIL: <0.2 MG/DL (ref 0–0.3)
BUN SERPL-MCNC: 28 MG/DL (ref 6–22)
CALCIUM SERPL-MCNC: 9.4 MG/DL (ref 8.4–10.5)
CHLORIDE SERPL-SCNC: 105 MMOL/L (ref 98–110)
CO2 SERPL-SCNC: 24 MMOL/L (ref 20–32)
CREAT SERPL-MCNC: 1.6 MG/DL (ref 0.8–1.4)
EOSINOPHIL # BLD: 0.1 K/UL (ref 0–0.5)
EOSINOPHIL NFR BLD: 1 % (ref 0–6)
ERYTHROCYTE [DISTWIDTH] IN BLOOD BY AUTOMATED COUNT: 12.9 % (ref 10–15.5)
GFRAA, 66117: 38
GFRNA, 66118: 31.4
GLOBULIN,GLOB: 2.7 G/DL (ref 2–4)
GLUCOSE SERPL-MCNC: 93 MG/DL (ref 70–99)
GRANULOCYTES,GRANS: 44 % (ref 40–75)
HCT VFR BLD AUTO: 38.5 % (ref 35.1–48.3)
HGB BLD-MCNC: 12.3 G/DL (ref 11.7–16.1)
LYMPHOCYTES, LYMLT: 46 % (ref 20–45)
MAGNESIUM SERPL-MCNC: 2.2 MG/DL (ref 1.6–2.5)
MCH RBC QN AUTO: 29 PG (ref 26–34)
MCHC RBC AUTO-ENTMCNC: 32 G/DL (ref 31–36)
MCV RBC AUTO: 89 FL (ref 80–99)
MONOCYTES # BLD: 0.4 K/UL (ref 0.1–1)
MONOCYTES NFR BLD: 8 % (ref 3–12)
NEUTROPHILS # BLD AUTO: 2.2 K/UL (ref 1.8–7.7)
PLATELET # BLD AUTO: 230 K/UL (ref 140–440)
PMV BLD AUTO: 10.1 FL (ref 9–13)
POTASSIUM SERPL-SCNC: 4.5 MMOL/L (ref 3.5–5.5)
PROT SERPL-MCNC: 6.4 G/DL (ref 6.2–8.1)
RBC # BLD AUTO: 4.31 M/UL (ref 3.8–5.2)
SODIUM SERPL-SCNC: 141 MMOL/L (ref 133–145)
WBC # BLD AUTO: 4.9 K/UL (ref 4–11)

## 2021-11-12 LAB — RAPAMYCIN (SIROLIMUS): 4.2 NG/ML (ref 3–20)

## 2022-01-20 LAB
A-G RATIO,AGRAT: 1.3 RATIO (ref 1.1–2.6)
ABSOLUTE LYMPHOCYTE COUNT, 10803: 2.6 K/UL (ref 1–4.8)
ALBUMIN SERPL-MCNC: 3.9 G/DL (ref 3.5–5)
ALP SERPL-CCNC: 67 U/L (ref 40–120)
ALT SERPL-CCNC: 18 U/L (ref 5–40)
ANION GAP SERPL CALC-SCNC: 11 MMOL/L (ref 3–15)
AST SERPL W P-5'-P-CCNC: 20 U/L (ref 10–37)
BASOPHILS # BLD: 0 K/UL (ref 0–0.2)
BASOPHILS NFR BLD: 0 % (ref 0–2)
BILIRUB SERPL-MCNC: 0.3 MG/DL (ref 0.2–1.2)
BILIRUBIN, DIRECT,CBIL: <0.2 MG/DL (ref 0–0.3)
BUN SERPL-MCNC: 20 MG/DL (ref 6–22)
CALCIUM SERPL-MCNC: 9.4 MG/DL (ref 8.4–10.5)
CHLORIDE SERPL-SCNC: 105 MMOL/L (ref 98–110)
CO2 SERPL-SCNC: 23 MMOL/L (ref 20–32)
CREAT SERPL-MCNC: 1.6 MG/DL (ref 0.8–1.4)
EOSINOPHIL # BLD: 0 K/UL (ref 0–0.5)
EOSINOPHIL NFR BLD: 1 % (ref 0–6)
ERYTHROCYTE [DISTWIDTH] IN BLOOD BY AUTOMATED COUNT: 12.9 % (ref 10–15.5)
GFRAA, 66117: 38
GFRNA, 66118: 31.4
GLOBULIN,GLOB: 2.9 G/DL (ref 2–4)
GLUCOSE SERPL-MCNC: 84 MG/DL (ref 70–99)
GRANULOCYTES,GRANS: 42 % (ref 40–75)
HCT VFR BLD AUTO: 45.6 % (ref 35.1–48.3)
HGB BLD-MCNC: 13.9 G/DL (ref 11.7–16.1)
LYMPHOCYTES, LYMLT: 49 % (ref 20–45)
MAGNESIUM SERPL-MCNC: 2.2 MG/DL (ref 1.6–2.5)
MCH RBC QN AUTO: 28 PG (ref 26–34)
MCHC RBC AUTO-ENTMCNC: 31 G/DL (ref 31–36)
MCV RBC AUTO: 93 FL (ref 80–99)
MONOCYTES # BLD: 0.4 K/UL (ref 0.1–1)
MONOCYTES NFR BLD: 7 % (ref 3–12)
NEUTROPHILS # BLD AUTO: 2.2 K/UL (ref 1.8–7.7)
PLATELET # BLD AUTO: 235 K/UL (ref 140–440)
PMV BLD AUTO: 10.4 FL (ref 9–13)
POTASSIUM SERPL-SCNC: 5.3 MMOL/L (ref 3.5–5.5)
PROT SERPL-MCNC: 6.8 G/DL (ref 6.2–8.1)
RAPAMYCIN (SIROLIMUS): 4.2 NG/ML (ref 3–20)
RBC # BLD AUTO: 4.93 M/UL (ref 3.8–5.2)
SODIUM SERPL-SCNC: 139 MMOL/L (ref 133–145)
WBC # BLD AUTO: 5.2 K/UL (ref 4–11)

## 2022-02-03 ENCOUNTER — OFFICE VISIT (OUTPATIENT)
Dept: HEMATOLOGY | Age: 67
End: 2022-02-03
Payer: MEDICARE

## 2022-02-03 VITALS
HEART RATE: 70 BPM | SYSTOLIC BLOOD PRESSURE: 96 MMHG | DIASTOLIC BLOOD PRESSURE: 38 MMHG | TEMPERATURE: 98.4 F | WEIGHT: 204 LBS | BODY MASS INDEX: 32.78 KG/M2 | RESPIRATION RATE: 17 BRPM | OXYGEN SATURATION: 98 % | HEIGHT: 66 IN

## 2022-02-03 DIAGNOSIS — Z94.4 S/P LIVER TRANSPLANT (HCC): Primary | ICD-10-CM

## 2022-02-03 PROCEDURE — G8432 DEP SCR NOT DOC, RNG: HCPCS | Performed by: NURSE PRACTITIONER

## 2022-02-03 PROCEDURE — 1101F PT FALLS ASSESS-DOCD LE1/YR: CPT | Performed by: NURSE PRACTITIONER

## 2022-02-03 PROCEDURE — G8536 NO DOC ELDER MAL SCRN: HCPCS | Performed by: NURSE PRACTITIONER

## 2022-02-03 PROCEDURE — G8427 DOCREV CUR MEDS BY ELIG CLIN: HCPCS | Performed by: NURSE PRACTITIONER

## 2022-02-03 PROCEDURE — 3017F COLORECTAL CA SCREEN DOC REV: CPT | Performed by: NURSE PRACTITIONER

## 2022-02-03 PROCEDURE — G8400 PT W/DXA NO RESULTS DOC: HCPCS | Performed by: NURSE PRACTITIONER

## 2022-02-03 PROCEDURE — G8417 CALC BMI ABV UP PARAM F/U: HCPCS | Performed by: NURSE PRACTITIONER

## 2022-02-03 PROCEDURE — 99214 OFFICE O/P EST MOD 30 MIN: CPT | Performed by: NURSE PRACTITIONER

## 2022-02-03 PROCEDURE — 1090F PRES/ABSN URINE INCON ASSESS: CPT | Performed by: NURSE PRACTITIONER

## 2022-02-03 PROCEDURE — G0463 HOSPITAL OUTPT CLINIC VISIT: HCPCS | Performed by: NURSE PRACTITIONER

## 2022-02-03 NOTE — PROGRESS NOTES
[address] [facility], [place], [address], [facility], [place]      ZAKI Rodriguez, [facility]     Marilyn Yung, [facility]   CELIA JacobsP-C Gean Cheadle, [facility]       Marybel Pryor [facility] [place] 136    at [address], [address] [place] [address], [facility] 22.    [phone]    FAX: [address] [place] [address]    at [address], [address], [address]    [phone]    FAX: [phone]       Patient Care Team:  Yeison Bridges MD as PCP - General (Family Medicine)  Abimael Montero MD as Physician (Surgery)  Michelle King MD as Physician (Obstetrics & Gynecology)  Mahnaz Dodge MD as Physician (Neurosurgery)  Rosemary Heredia DDS as Physician (Dental General Practice)  Natali Rahman O.D. as Physician (Optometry)  Derrick Hudson RN as Nurse Navigator  Justin Tabares MD (Gastroenterology)  Derrick Balbuena MD (Internal Medicine)  Óscar Chun MD (Hematology and Oncology)  Divya Segovia DO (Rheumatology)  Negro Pa MD (Dermatology)  Milton Buenrostro MD (Endocrinology)  Eron Romero DPM (Podiatry)  Jostin Sethi RN as Care Coordinator (Hepatology)        Problem List  Date Reviewed: 2/3/2022          Codes Class Noted    Long term current use of immunosuppressive drug ICD-10-CM: Z79.899  ICD-9-CM: V58.69  4/18/2018        H/O liver transplant [facility]) ICD-10-CM: Z94.4  ICD-9-CM: V42.7  40/91/2291        Complication of transplanted liver [facility]) ICD-10-CM: T86.40  ICD-9-CM: 996.82  11/13/2013        Back pain, lumbosacral ICD-10-CM: M54.50  ICD-9-CM: 724.2, 724.6  1/27/2013        Diabetes mellitus ([facility] ICD-10-CM: E11.9  ICD-9-CM: 250.00  5/28/2012        Colon polyps ICD-10-CM: K63.5  ICD-9-CM: 211.3  5/28/2012 Breast cancer [facility]) ICD-10-CM: C50.919  ICD-9-CM: 174.9  5/28/2012    Overview Signed 5/28/2012  7:00 AM by Charmaine Membreno MD     Masectomy, chemotherapy,XRT. 1996  Tamoxifen [id number]               H/O shoulder surgery ICD-10-CM: Z98.890  ICD-9-CM: V45.89  5/28/2012    Overview Signed 5/28/2012  7:07 AM by Charmaine Membreno MD     12/2011                   Cyndia Primrose returns to the [facility] for management of liver allograft function, to adjust immune suppression. The active problem list, all pertinent past medical history, medications, liver histology, endoscopic studies, radiologic findings and laboratory findings related to the liver disorder were reviewed with the patient. The patient underwent liver transplantation at [place], [place] in 4/2013. The patient is currently receiving sirolimus, cellcept for immune suppression. The patient had an acute graft rejection in 10/2017. The patient has no symptoms which can be attributed to the liver disorder. The patient has not experienced fatigue, fevers, chills. The patient completes all daily activities without any functional limitations. All of the issues listed in the Assessment and Plan were discussed with the patient. All questions were answered. Since the last office appointment:  Seeing a new endocrinologist, Dr. Naren Jin. Now using \"omnipod\" to regulate blood sugars. Had labs. Fractured bones in her right foot when she fell in her yard on 05/01/2021 are better. She is walking unassisted. \"I finally got a regular shoe on today\". 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   The date of the liver transplant was 4/2013    The most recent laboratory studies indicate that the liver transaminases are normal, alkaline phosphatase is normal, tests of hepatic synthetic and metabolic function are normal, and the platelet count is normal.      A liver biopsy from 11/2017 demonstrated acute rejection. Rejection was treated with high dose steroids and taper. A liver biopsy in 6/2018 demonstrated fatty liver with no rejection. Dose: Siro: 1 mg every day, cellcept 250 mg bid    Immune Suppression  The patient is receiving immune suppression with sirolimus which is being well tolerated at 1 mg/day. The immune suppression blood level is in the proper range. Sirolimus was 4.2 ng/ml on 01/20/2022. We will continue the Cellcept at 250 mg bid for now. NAFL  The diagnosis was made by liver biopsy in 6/2018. The need to perform an assessment of liver fibrosis was discussed with the patient. The Fibroscan can assess liver fibrosis and determine if a patient has advanced fibrosis or cirrhosis without the need for liver biopsy. This indicates normal to mild hepatic fibrosis, Metavir fibrosis score of F1. The Fibroscan can be repeated annually or as often as clinically indicated to assess for fibrosis progression and/or regression. This will be performed when she returns for follow up in 3 months. The patient was counseled regarding diet and exercise to achieve weight loss. The best diet for patients with fatty liver is one very low in carbohydrates and enriched with protein such as an Martha[employer] program.    The patient was told not to consume any food or drinks products containing fructose as this enhances hepatic fat synthesis. Acute and chronic kidney injury   This is a common adverse event of immune suppression. The Screat is stable in the 1.6 mg range. Aspirin and NSAIDs should be avoided since these agents can worsen renal insufficiency. Hypercholesterolemia   This can be caused by immune suppression. Serum cholesterol is normal and does not need to be treated at this time. Hypertension   This is a common side effect of immune suppression.     Blood pressure is well controlled on the current treatment. Low serum magnesium   This is a common side effect of immune suppression. The patient has a normal magnesium level and does not need supplementation. Osteoporosis   Osteoporosis is common in patients with cirhrosis prior to liver transplant. The patient had osteoporosis on DEXA scan from 2013 prior to the LT. The patient still had osteoporosis on DEXA scan from 10/2015. The patient was advised to take calcium supplementation and Vitamin D. The patient is taking prolia since 11/14/2018. The most recent DEXA in 7/2020 still showed osteoporosis. Monitoring for skin Cancer  The patient was counseled regarding increased risk of skin cancer in transplant recipients and need to have any new skin lesions evaluated by dermatology and removed if suspicious. The patient was instructed to see Dermatology annually examination. Colon polyps  The patient has had colon polyps in the past.  The last colonoscopy was in 2016 with Dr Jiménez. Most recent 12/17/2020. Clear    Vaccinations  Routine vaccinations against other bacterial and viral agents can be performed as long as this is with attentuatted virus. Live virus vaccines should not be administered. Annual flu vaccination should be administered. ALLERGIES:  Allergies   Allergen Reactions    Tape [Adhesive] Other (comments)     Pulls skin off    Percocet [Oxycodone-Acetaminophen] Shortness of Breath, Itching and Other (comments)     \"Burning skin\"    Statins-Hmg-Coa Reductase Inhibitors Other (comments)     liver     MEDICATIONS:  Current Outpatient Medications   Medication Sig    furosemide (LASIX) 40 mg tablet Take 1 Tablet by mouth daily.  mycophenolate (CELLCEPT) 500 mg tablet Take 2 tablets by mouth twice daily    insulin aspart U-100 (NovoLOG Flexpen U-100 Insulin) 100 unit/mL (3 mL) inpn by SubCUTAneous route.     sirolimus (RAPAMUNE) 1 mg tablet Take 4 tabs by mouth daily    ondansetron (ZOFRAN ODT) 4 mg disintegrating tablet Take 1 Tab by mouth every three to four (3-4) hours as needed for Nausea or Vomiting.  butalbital-aspirin-caffeine (FIORINAL) capsule butalbital-aspirin-caffeine 50 mg-325 mg-40 mg capsule    lisinopril (PRINIVIL, ZESTRIL) 10 mg tablet Take 10 mg by mouth daily.  pantoprazole (PROTONIX) 40 mg tablet pantoprazole 40 mg tablet,delayed release    denosumab (PROLIA) 60 mg/mL injection 60 mg by SubCUTAneous route.  metoprolol tartrate (LOPRESSOR) 50 mg tablet Take 25 mg by mouth two (2) times a day. No current facility-administered medications for this visit. SYSTEM REVIEW NOT RELATED TO LIVER DISEASE OR REVIEWED ABOVE:  Constitution systems: Weight gain with steroids. Eyes: Negative for visual changes. ENT: Negative for sore throat, painful swallowing. Respiratory: Negative for cough, hemoptysis, SOB. Cardiology: Negative for chest pain, palpitations. GI:  Negative for constipation or diarrhea. : Negative for urinary frequency, dysuria, hematuria, nocturia. Skin: Negative for rash. Hematology: Negative for easy bruising, blood clots. Musculo-skelatal: Negative for muscle pain or weakness. Neurologic: Negative for headaches, dizziness, vertigo, memory problems not related to HE. Psychology: Negative for anxiety, depression. FAMILY HISTORY:  There is no family history of liver disease. SOCIAL HISTORY:  The patient is . There are 2 children and 2 grandchildren. The patient has never used tobacco products. The patient has never consumed significant amounts of alcohol. The patient used to work for [employer] and then as an  for [employer].  She has not worked since the liver transplant. PHYSICAL EXAMINATION:  Visit Vitals  BP (!) 96/38   Pulse 70   Temp 98.4 °F (36.9 °C)   Resp 17   Ht 5' 6\" (1.676 m)   Wt 204 lb (92.5 kg)   SpO2 98%   BMI 32.93 kg/m²       General: Appears healthy.   No acute distress. Eyes: Sclera anicteric. ENT: No oral lesions. Thyroid normal.  Nodes: No adenopathy. Skin: No spider angiomata. No jaundice. No palmar erythema. Respiratory: Lungs clear to auscultation. Cardiovascular: Regular heart rate. No murmurs. No JVD. Abdomen:   Well healed liver transplant incision. Soft non-tender. Liver size normal to percussion/palpation. Spleen not palpable. No obvious ascites. Extremities: No lower edema. No muscle wasting. Neurologic:  Alert and oriented. Cranial nerves grossly intact. No asterixsis. LAB STUDIES:  [facility] [address] [place] [address] & Units 1/20/2022 11/11/2021   WBC 4.0 - 11.0 K/uL 5.2 4.9   ANC 1.8 - 7.7 K/uL 2.2 2.2   HGB 11.7 - 16.1 g/dL 13.9 12.3    - 440 K/uL 235 230   INR 0.89 - 1.29     AST 10 - 37 U/L 20 23   ALT 5 - 40 U/L 18 18   Alk Phos 40 - 120 U/L 67 67   Bili, Total 0.2 - 1.2 mg/dL 0.3 0.2   Bili, Direct 0.0 - 0.3 mg/dL <0.2 <0.2   Albumin 3.5 - 5.0 g/dL 3.9 3.7   BUN 6 - 22 mg/dL 20 28 (H)   Creat 0.8 - 1.4 mg/dL 1.6 (H) 1.6 (H)   Na 133 - 145 mmol/L 139 141   K 3.5 - 5.5 mmol/L 5.3 4.5   Cl 98 - 110 mmol/L 105 105   CO2 20 - 32 mmol/L 23 24   Glucose 70 - 99 mg/dL 84 93   Magnesium 1.6 - 2.5 mg/dL 2.2 2.2     SEROLOGIES:  2/2012. HAV total negative, HBsAntigen negative, anti-HBcore negative, anti-HBsurface negative, anti-HCV negative, Ferritin 20, NEVA negative, ASMA negative, AMA negative, ceruloplsmin 34, alpha-1-antitrypsin 195, A1AT phenotype MM.  2/2013. Ferritin 434, ASMA weak positive, CMV IgG positive, EBV IgG positive, anti-HIV negative, HBA1C 5.1    LIVER HISTOLOGY:  11/2017. Slides reviewed by MLS. Acute graft rejection. 6/2018. Slides reviewed by MLS. NAFL. 40% macro and micovesicular steatosis. No ballooning. No inflammation. No fibrosis. No rejection. ENDOSCOPIC PROCEDURES:  1/2012. EGD by Dr Ayaan Denton. Esophageal varices. RADIOLOGY:  1/2012.   CT scan abdomen with and without IV contrast.  Changes consistent with cirrhosis. No liver mass lesions. No dilated bile ducts. No bile duct strictures. Varices. Generalized ascites. 07/2012:  Ultrasound of the liver. Echogenic consistent with chronic liver disease, cirrhosis. No liver mass lesions. No ascites  11/2012: Ultrasound of the liver. Echogenic consistent with chronic liver disease, cirrhosis. No liver mass lesions. Small amount ascites present. 1/2013. Ultrasound of liver. Echogenic consistent with cirrhosis. No liver mass lesions. No dilated bile ducts. Mild ascites. 11/2013. Ultrasound of liver. Normal appearing liver. No liver mass lesions. 12/2013:  MRI of abdomen. Questionable small focal stenosis of distal common hepatic duct. Focal stenosis in mid-portal vein. 02/2015. Ultrasound of the liver. Consistent with cirrhosis. 06/2018. Ultrasound of the liver. Mixed echogenicity, complex collection in the medial right hepatic lobe most in keeping with hematoma. 05/2019. Pelvic MRI w/wo contrast.  Enhancing endocervical canal 1.5 cm lesion. 02/2020. Abdominal Ultrasound. The liver appeared diffusely echogenic.  No solid mass. OTHER TESTING:.   2/2013. ETOH negative. 8/2013:  DEXA scan - osteoporosis   10/2015. DEXA scan osteoporosis. 8/2018. TFTS. TSH 0.38/T3 free 3.4/T4 free 1.3    25 minutes total time spent with this patient with more than 50% of this time spent counseling and coordinating care as described above. FOLLOW-UP:  All of the issues listed above in the Assessment and Plan were discussed with the patient. All questions were answered. The patient expressed a clear understanding of the above. [address] in 3 months for fibroscan and continued routine monitoring.      Chayito Weiss, FNP-C  [facility] [place] [facility]  [address], [address]   [address], [address] [facility]   [phone]

## 2022-02-25 LAB
A-G RATIO,AGRAT: 1.5 RATIO (ref 1.1–2.6)
ABSOLUTE LYMPHOCYTE COUNT, 10803: 2.3 K/UL (ref 1–4.8)
ALBUMIN SERPL-MCNC: 3.8 G/DL (ref 3.5–5)
ALP SERPL-CCNC: 59 U/L (ref 40–120)
ALT SERPL-CCNC: 19 U/L (ref 5–40)
ANION GAP SERPL CALC-SCNC: 10 MMOL/L (ref 3–15)
AST SERPL W P-5'-P-CCNC: 22 U/L (ref 10–37)
BASOPHILS # BLD: 0 K/UL (ref 0–0.2)
BASOPHILS NFR BLD: 0 % (ref 0–2)
BILIRUB SERPL-MCNC: 0.2 MG/DL (ref 0.2–1.2)
BILIRUBIN, DIRECT,CBIL: <0.2 MG/DL (ref 0–0.3)
BUN SERPL-MCNC: 24 MG/DL (ref 6–22)
CALCIUM SERPL-MCNC: 8.7 MG/DL (ref 8.4–10.5)
CHLORIDE SERPL-SCNC: 107 MMOL/L (ref 98–110)
CO2 SERPL-SCNC: 22 MMOL/L (ref 20–32)
CREAT SERPL-MCNC: 1.4 MG/DL (ref 0.8–1.4)
EOSINOPHIL # BLD: 0 K/UL (ref 0–0.5)
EOSINOPHIL NFR BLD: 1 % (ref 0–6)
ERYTHROCYTE [DISTWIDTH] IN BLOOD BY AUTOMATED COUNT: 12.8 % (ref 10–15.5)
GFRAA, 66117: 44.2
GFRNA, 66118: 36.5
GLOBULIN,GLOB: 2.6 G/DL (ref 2–4)
GLUCOSE SERPL-MCNC: 90 MG/DL (ref 70–99)
GRANULOCYTES,GRANS: 51 % (ref 40–75)
HCT VFR BLD AUTO: 40.9 % (ref 35.1–48.3)
HGB BLD-MCNC: 12.8 G/DL (ref 11.7–16.1)
LYMPHOCYTES, LYMLT: 41 % (ref 20–45)
MAGNESIUM SERPL-MCNC: 2.7 MG/DL (ref 1.6–2.5)
MCH RBC QN AUTO: 29 PG (ref 26–34)
MCHC RBC AUTO-ENTMCNC: 31 G/DL (ref 31–36)
MCV RBC AUTO: 91 FL (ref 80–99)
MONOCYTES # BLD: 0.4 K/UL (ref 0.1–1)
MONOCYTES NFR BLD: 7 % (ref 3–12)
NEUTROPHILS # BLD AUTO: 2.8 K/UL (ref 1.8–7.7)
PLATELET # BLD AUTO: 214 K/UL (ref 140–440)
PMV BLD AUTO: 10.3 FL (ref 9–13)
POTASSIUM SERPL-SCNC: 5.1 MMOL/L (ref 3.5–5.5)
PROT SERPL-MCNC: 6.4 G/DL (ref 6.2–8.1)
RAPAMYCIN (SIROLIMUS): 3.9 NG/ML (ref 3–20)
RBC # BLD AUTO: 4.48 M/UL (ref 3.8–5.2)
SODIUM SERPL-SCNC: 139 MMOL/L (ref 133–145)
WBC # BLD AUTO: 5.6 K/UL (ref 4–11)

## 2022-03-19 PROBLEM — Z79.899 LONG TERM CURRENT USE OF IMMUNOSUPPRESSIVE DRUG: Status: ACTIVE | Noted: 2018-04-18

## 2022-03-19 PROBLEM — Z79.60 LONG TERM CURRENT USE OF IMMUNOSUPPRESSIVE DRUG: Status: ACTIVE | Noted: 2018-04-18

## 2022-05-05 ENCOUNTER — OFFICE VISIT (OUTPATIENT)
Dept: HEMATOLOGY | Age: 67
End: 2022-05-05
Payer: MEDICARE

## 2022-05-05 VITALS
SYSTOLIC BLOOD PRESSURE: 106 MMHG | RESPIRATION RATE: 18 BRPM | HEIGHT: 66 IN | BODY MASS INDEX: 32.78 KG/M2 | DIASTOLIC BLOOD PRESSURE: 50 MMHG | HEART RATE: 72 BPM | OXYGEN SATURATION: 98 % | TEMPERATURE: 97 F | WEIGHT: 204 LBS

## 2022-05-05 DIAGNOSIS — Z94.4 S/P LIVER TRANSPLANT (HCC): Primary | ICD-10-CM

## 2022-05-05 PROCEDURE — G8432 DEP SCR NOT DOC, RNG: HCPCS | Performed by: NURSE PRACTITIONER

## 2022-05-05 PROCEDURE — G8400 PT W/DXA NO RESULTS DOC: HCPCS | Performed by: NURSE PRACTITIONER

## 2022-05-05 PROCEDURE — G8536 NO DOC ELDER MAL SCRN: HCPCS | Performed by: NURSE PRACTITIONER

## 2022-05-05 PROCEDURE — 91200 LIVER ELASTOGRAPHY: CPT | Performed by: NURSE PRACTITIONER

## 2022-05-05 PROCEDURE — 1101F PT FALLS ASSESS-DOCD LE1/YR: CPT | Performed by: NURSE PRACTITIONER

## 2022-05-05 PROCEDURE — G8427 DOCREV CUR MEDS BY ELIG CLIN: HCPCS | Performed by: NURSE PRACTITIONER

## 2022-05-05 PROCEDURE — 1090F PRES/ABSN URINE INCON ASSESS: CPT | Performed by: NURSE PRACTITIONER

## 2022-05-05 PROCEDURE — G8417 CALC BMI ABV UP PARAM F/U: HCPCS | Performed by: NURSE PRACTITIONER

## 2022-05-05 PROCEDURE — 3017F COLORECTAL CA SCREEN DOC REV: CPT | Performed by: NURSE PRACTITIONER

## 2022-05-05 PROCEDURE — G0463 HOSPITAL OUTPT CLINIC VISIT: HCPCS | Performed by: NURSE PRACTITIONER

## 2022-05-05 PROCEDURE — 99214 OFFICE O/P EST MOD 30 MIN: CPT | Performed by: NURSE PRACTITIONER

## 2022-05-05 NOTE — PROGRESS NOTES
[address] [place] [address], MD, ErenJose meyers [place]      ZAKI Larson, [facility]     Marilyn Yung, [facility]   Meagan Calvo FNP-ROZINA Chase, [facility]       Marybel Deputado Reece [address]    at [facility]    [address] [facility], [address] Kimberley Juares  22.    [phone]    FAX: [address] [place] [address], [address]    [phone]    FAX: [phone]       Patient Care Team:  Afua Morris MD as PCP - General (Family Medicine)  Benito Solano MD (Inactive) as Physician (Surgery Physician)  Sahara Dial MD as Physician (Obstetrics & Gynecology)  Jade Almanza MD as Physician (Neurosurgery)  Kelsi Elliott DDS as Physician (Dental General Practice)  Lorie Silva O.D. as Physician (Optometry)  Geovanna Tierney MD (Gastroenterology)  Maral Infante MD (Internal Medicine Physician)  Lorrie Scheuermann, MD (Hematology and Oncology)  Eboni Simental DO (Rheumatology Internal Medicine)  Tarah De La Fuente MD (Dermatology Physician)  Bruno Alcantara MD (Endocrinology Physician)  Cory Barthel, DPM (Podiatry)  Anuel Boone, SHARON as Nurse Navigator (Hepatology)        Problem List  Date Reviewed: 2/3/2022          Codes Class Noted    Long term current use of immunosuppressive drug ICD-10-CM: Z79.899  ICD-9-CM: V58.69  4/18/2018        H/O liver transplant [facility]) ICD-10-CM: Z94.4  ICD-9-CM: V42.7  80/43/3763        Complication of transplanted liver [facility]) ICD-10-CM: T86.40  ICD-9-CM: 996.82  11/13/2013        Back pain, lumbosacral ICD-10-CM: M54.50  ICD-9-CM: 724.2, 724.6  1/27/2013        Diabetes mellitus ([facility] ICD-10-CM: E11.9  ICD-9-CM: 250.00  5/28/2012        Colon polyps ICD-10-CM: T63.6  ICD-9-CM: 211.3  5/28/2012        Breast cancer ([facility] ICD-10-CM: C50.919  ICD-9-CM: 174.9  5/28/2012    Overview Signed 5/28/2012  7:00 AM by Kelsi Egan MD     Masectomy, chemotherapy,XRT. 1996  Tamoxifen [id number]               H/O shoulder surgery ICD-10-CM: Z98.890  ICD-9-CM: V45.89  5/28/2012    Overview Signed 5/28/2012  7:07 AM by Kelsi Egan MD     12/2011                   Jeri Phillip returns to the [address] [id number] of [facility] for fibroscan assessment of hepatic fibrosis and for management of liver allograft function, to adjust immune suppression. The active problem list, all pertinent past medical history, medications, liver histology, endoscopic studies, radiologic findings and laboratory findings related to the liver disorder were reviewed with the patient. The patient underwent liver transplantation at [place], [place] in 4/2013. The patient is currently receiving sirolimus, cellcept for immune suppression. The patient had an acute graft rejection in 10/2017. The patient has no symptoms which can be attributed to the liver disorder. The patient has not experienced fatigue, fevers, chills. The patient completes all daily activities without any functional limitations. All of the issues listed in the Assessment and Plan were discussed with the patient. All questions were answered. Since the last office appointment:  Seeing a new endocrinologist, Dr. Weston Martin. Now using \"omnipod\" to regulate blood sugars. Had labs. Fractured bones in her right foot when she fell in her yard on 05/01/2021 are better. She is walking unassisted. 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   The date of the liver transplant was 4/2013    The most recent laboratory studies indicate that the liver transaminases are normal, alkaline phosphatase is normal, tests of hepatic synthetic and metabolic function are normal, and the platelet count is normal.      A liver biopsy from 11/2017 demonstrated acute rejection. Rejection was treated with high dose steroids and taper. A liver biopsy in 6/2018 demonstrated fatty liver with no rejection. Immune Suppression  The patient is receiving immune suppression with sirolimus which is being well tolerated at 1 mg/day. The immune suppression blood level is in the proper range. Sirolimus was 3.5 ng/ml on 04/29/2022. We will continue the Cellcept at 250 mg bid for now. NAFL  The diagnosis was made by liver biopsy in 6/2018. The need to perform an assessment of liver fibrosis was discussed with the patient. The Fibroscan can assess liver fibrosis and determine if a patient has advanced fibrosis or cirrhosis without the need for liver biopsy. The fibroscan was repeated today. Today's fibroscan results suggest a Metavir fibrosis score of F0. There is evidence of fatty liver per the scan. The Fibroscan can be repeated annually or as often as clinically indicated to assess for fibrosis progression and/or regression. The patient was counseled regarding diet and exercise to achieve weight loss. The best diet for patients with fatty liver is one very low in carbohydrates and enriched with protein such as an Martha[employer] program.    The patient was told not to consume any food or drinks products containing fructose as this enhances hepatic fat synthesis. Acute and chronic kidney injury   This is a common adverse event of immune suppression. The Screat is stable in the 1.6 mg range. Aspirin and NSAIDs should be avoided since these agents can worsen renal insufficiency. Hypercholesterolemia   This can be caused by immune suppression. Serum cholesterol is normal and does not need to be treated at this time. Hypertension   This is a common side effect of immune suppression.     Blood pressure is well controlled on the current treatment. Low serum magnesium   This is a common side effect of immune suppression. The patient has a normal magnesium level and does not need supplementation. Osteoporosis   Osteoporosis is common in patients with cirhrosis prior to liver transplant. The patient had osteoporosis on DEXA scan from 2013 prior to the LT. The patient still had osteoporosis on DEXA scan from 10/2015. The patient was advised to take calcium supplementation and Vitamin D. The patient is taking prolia since 11/14/2018. The most recent DEXA in 7/2020 still showed osteoporosis. Monitoring for skin Cancer  The patient was counseled regarding increased risk of skin cancer in transplant recipients and need to have any new skin lesions evaluated by dermatology and removed if suspicious. The patient was instructed to see Dermatology annually examination. Colon polyps  The patient has had colon polyps in the past.  The last colonoscopy was in 2016 with Dr Lana Brar. Most recent 12/17/2020. Clear    Vaccinations  Routine vaccinations against other bacterial and viral agents can be performed as long as this is with attentuatted virus. Live virus vaccines should not be administered. Annual flu vaccination should be administered. ALLERGIES:  Allergies   Allergen Reactions    Tape [Adhesive] Other (comments)     Pulls skin off    Percocet [Oxycodone-Acetaminophen] Shortness of Breath, Itching and Other (comments)     \"Burning skin\"    Statins-Hmg-Coa Reductase Inhibitors Other (comments)     liver     MEDICATIONS:  Current Outpatient Medications   Medication Sig    furosemide (LASIX) 40 mg tablet Take 1 Tablet by mouth daily.  mycophenolate (CELLCEPT) 500 mg tablet Take 2 tablets by mouth twice daily    insulin aspart U-100 (NovoLOG Flexpen U-100 Insulin) 100 unit/mL (3 mL) inpn by SubCUTAneous route.     sirolimus (RAPAMUNE) 1 mg tablet Take 4 tabs by mouth daily    ondansetron (ZOFRAN ODT) 4 mg disintegrating tablet Take 1 Tab by mouth every three to four (3-4) hours as needed for Nausea or Vomiting.  butalbital-aspirin-caffeine (FIORINAL) capsule butalbital-aspirin-caffeine 50 mg-325 mg-40 mg capsule    lisinopril (PRINIVIL, ZESTRIL) 10 mg tablet Take 10 mg by mouth daily.  pantoprazole (PROTONIX) 40 mg tablet pantoprazole 40 mg tablet,delayed release    denosumab (PROLIA) 60 mg/mL injection 60 mg by SubCUTAneous route.  metoprolol tartrate (LOPRESSOR) 50 mg tablet Take 25 mg by mouth two (2) times a day. No current facility-administered medications for this visit. SYSTEM REVIEW NOT RELATED TO LIVER DISEASE OR REVIEWED ABOVE:  Constitution systems: Weight gain with steroids. Eyes: Negative for visual changes. ENT: Negative for sore throat, painful swallowing. Respiratory: Negative for cough, hemoptysis, SOB. Cardiology: Negative for chest pain, palpitations. GI:  Negative for constipation or diarrhea. : Negative for urinary frequency, dysuria, hematuria, nocturia. Skin: Negative for rash. Hematology: Negative for easy bruising, blood clots. Musculo-skelatal: Negative for muscle pain or weakness. Neurologic: Negative for headaches, dizziness, vertigo, memory problems not related to HE. Psychology: Negative for anxiety, depression. FAMILY HISTORY:  There is no family history of liver disease. SOCIAL HISTORY:  The patient is . There are 2 children and 2 grandchildren. The patient has never used tobacco products. The patient has never consumed significant amounts of alcohol. The patient used to work for [employer] and then as an  for [employer].  She has not worked since the liver transplant. PHYSICAL EXAMINATION:  Visit Vitals  BP (!) 106/50   Pulse 72   Temp 97 °F (36.1 °C)   Resp 18   Ht 5' 6\" (1.676 m)   Wt 204 lb (92.5 kg)   SpO2 98%   BMI 32.93 kg/m²       General: Appears healthy.   No acute distress. Eyes: Sclera anicteric. ENT: No oral lesions. Thyroid normal.  Nodes: No adenopathy. Skin: No spider angiomata. No jaundice. No palmar erythema. Respiratory: Lungs clear to auscultation. Cardiovascular: Regular heart rate. No murmurs. No JVD. Abdomen:   Well healed liver transplant incision. Soft non-tender. Liver size normal to percussion/palpation. Spleen not palpable. No obvious ascites. Extremities: No lower edema. No muscle wasting. Neurologic:  Alert and oriented. Cranial nerves grossly intact. No asterixsis. LAB STUDIES:  From 2022:  AST/ ALT/ ALP/ T. BILI/ ALB: / 58/ 0.2/ 3.5  BUN/ CRT/ PLT:  20 1.5/  228,000  M.0  SIR: 3.5    Liver [place] of [address] Units 2022   WBC 4.0 - 11.0 K/uL 5.6 5.2   ANC 1.8 - 7.7 K/uL 2.8 2.2   HGB 11.7 - 16.1 g/dL 12.8 13.9    - 440 K/uL 214 235   INR 0.89 - 1.29     AST 10 - 37 U/L 22 20   ALT 5 - 40 U/L 19 18   Alk Phos 40 - 120 U/L 59 67   Bili, Total 0.2 - 1.2 mg/dL 0.2 0.3   Bili, Direct 0.0 - 0.3 mg/dL <0.2 <0.2   Albumin 3.5 - 5.0 g/dL 3.8 3.9   BUN 6 - 22 mg/dL 24 (H) 20   Creat 0.8 - 1.4 mg/dL 1.4 1.6 (H)   Na 133 - 145 mmol/L 139 139   K 3.5 - 5.5 mmol/L 5.1 5.3   Cl 98 - 110 mmol/L 107 105   CO2 20 - 32 mmol/L 22 23   Glucose 70 - 99 mg/dL 90 84   Magnesium 1.6 - 2.5 mg/dL 2.7 (H) 2.2     SEROLOGIES:  2012. HAV total negative, HBsAntigen negative, anti-HBcore negative, anti-HBsurface negative, anti-HCV negative, Ferritin 20, NEVA negative, ASMA negative, AMA negative, ceruloplsmin 34, alpha-1-antitrypsin 195, A1AT phenotype MM.  2013. Ferritin 434, ASMA weak positive, CMV IgG positive, EBV IgG positive, anti-HIV negative, HBA1C 5.1    LIVER HISTOLOGY:  2017. Slides reviewed by MLS. Acute graft rejection. 2018. Slides reviewed by MLS. NAFL. 40% macro and micovesicular steatosis. No ballooning. No inflammation. No fibrosis. No rejection. 2022. [facility]. EkPa was 5.9. IQR/med 22%. . The results suggested a fibrosis level of F0. The CAP score suggests there is hepatic steatosis. ENDOSCOPIC PROCEDURES:  1/2012. EGD by Dr Sande Hatchet. Esophageal varices. RADIOLOGY:  1/2012. CT scan abdomen with and without IV contrast.  Changes consistent with cirrhosis. No liver mass lesions. No dilated bile ducts. No bile duct strictures. Varices. Generalized ascites. 07/2012:  Ultrasound of the liver. Echogenic consistent with chronic liver disease, cirrhosis. No liver mass lesions. No ascites  11/2012: Ultrasound of the liver. Echogenic consistent with chronic liver disease, cirrhosis. No liver mass lesions. Small amount ascites present. 1/2013. Ultrasound of liver. Echogenic consistent with cirrhosis. No liver mass lesions. No dilated bile ducts. Mild ascites. 11/2013. Ultrasound of liver. Normal appearing liver. No liver mass lesions. 12/2013:  MRI of abdomen. Questionable small focal stenosis of distal common hepatic duct. Focal stenosis in mid-portal vein. 02/2015. Ultrasound of the liver. Consistent with cirrhosis. 06/2018. Ultrasound of the liver. Mixed echogenicity, complex collection in the medial right hepatic lobe most in keeping with hematoma. 05/2019. Pelvic MRI w/wo contrast.  Enhancing endocervical canal 1.5 cm lesion. 02/2020. Abdominal Ultrasound. The liver appeared diffusely echogenic.  No solid mass. OTHER TESTING:.   2/2013. ETOH negative. 8/2013:  DEXA scan - osteoporosis   10/2015. DEXA scan osteoporosis. 8/2018. TFTS. TSH 0.38/T3 free 3.4/T4 free 1.3    25 minutes total time spent with this patient with more than 50% of this time spent counseling and coordinating care as described above. FOLLOW-UP:  All of the issues listed above in the Assessment and Plan were discussed with the patient. All questions were answered.   The patient expressed a clear understanding of the above. [address] in 3 months for continued routine monitoring.      Calixto Meléndez, FNP-C  Liver [place]  [address] [facility], [address] [place] [address]   [phone]

## 2022-06-27 LAB
A-G RATIO,AGRAT: 1.6 RATIO (ref 1.1–2.6)
ABSOLUTE LYMPHOCYTE COUNT, 10803: 2.8 K/UL (ref 1–4.8)
ALBUMIN SERPL-MCNC: 3.9 G/DL (ref 3.5–5)
ALP SERPL-CCNC: 64 U/L (ref 40–120)
ALT SERPL-CCNC: 23 U/L (ref 5–40)
ANION GAP SERPL CALC-SCNC: 10 MMOL/L (ref 3–15)
AST SERPL W P-5'-P-CCNC: 20 U/L (ref 10–37)
BASOPHILS # BLD: 0 K/UL (ref 0–0.2)
BASOPHILS NFR BLD: 0 % (ref 0–2)
BILIRUB SERPL-MCNC: 0.2 MG/DL (ref 0.2–1.2)
BILIRUBIN, DIRECT,CBIL: <0.2 MG/DL (ref 0–0.3)
BUN SERPL-MCNC: 18 MG/DL (ref 6–22)
CALCIUM SERPL-MCNC: 9.1 MG/DL (ref 8.4–10.5)
CHLORIDE SERPL-SCNC: 108 MMOL/L (ref 98–110)
CO2 SERPL-SCNC: 23 MMOL/L (ref 20–32)
CREAT SERPL-MCNC: 1.4 MG/DL (ref 0.8–1.4)
EOSINOPHIL # BLD: 0.1 K/UL (ref 0–0.5)
EOSINOPHIL NFR BLD: 1 % (ref 0–6)
ERYTHROCYTE [DISTWIDTH] IN BLOOD BY AUTOMATED COUNT: 13.2 % (ref 10–15.5)
GLOBULIN,GLOB: 2.5 G/DL (ref 2–4)
GLOMERULAR FILTRATION RATE: 39.9 ML/MIN/1.73 SQ.M.
GLUCOSE SERPL-MCNC: 76 MG/DL (ref 70–99)
GRANULOCYTES,GRANS: 41 % (ref 40–75)
HCT VFR BLD AUTO: 39 % (ref 35.1–48.3)
HGB BLD-MCNC: 12.2 G/DL (ref 11.7–16.1)
LYMPHOCYTES, LYMLT: 49 % (ref 20–45)
MAGNESIUM SERPL-MCNC: 2.5 MG/DL (ref 1.6–2.5)
MCH RBC QN AUTO: 29 PG (ref 26–34)
MCHC RBC AUTO-ENTMCNC: 31 G/DL (ref 31–36)
MCV RBC AUTO: 92 FL (ref 80–99)
MONOCYTES # BLD: 0.4 K/UL (ref 0.1–1)
MONOCYTES NFR BLD: 7 % (ref 3–12)
NEUTROPHILS # BLD AUTO: 2.3 K/UL (ref 1.8–7.7)
PLATELET # BLD AUTO: 246 K/UL (ref 140–440)
PMV BLD AUTO: 10.3 FL (ref 9–13)
POTASSIUM SERPL-SCNC: 4.8 MMOL/L (ref 3.5–5.5)
PROT SERPL-MCNC: 6.4 G/DL (ref 6.2–8.1)
RAPAMYCIN (SIROLIMUS): 3.6 NG/ML (ref 3–20)
RBC # BLD AUTO: 4.22 M/UL (ref 3.8–5.2)
SODIUM SERPL-SCNC: 141 MMOL/L (ref 133–145)
WBC # BLD AUTO: 5.7 K/UL (ref 4–11)

## 2022-06-28 ENCOUNTER — TELEPHONE (OUTPATIENT)
Dept: HEMATOLOGY | Age: 67
End: 2022-06-28

## 2022-06-28 NOTE — TELEPHONE ENCOUNTER
Called patient to review recent lab results. Informed patient all of her labs are within normal limits. Pt verbally confirmed understanding. Pt has her labs drawn monthly.

## 2022-07-30 LAB
A-G RATIO,AGRAT: 1.5 RATIO (ref 1.1–2.6)
ABSOLUTE LYMPHOCYTE COUNT, 10803: 2.8 K/UL (ref 1–4.8)
ALBUMIN SERPL-MCNC: 3.8 G/DL (ref 3.5–5)
ALP SERPL-CCNC: 63 U/L (ref 40–120)
ALT SERPL-CCNC: 25 U/L (ref 5–40)
ANION GAP SERPL CALC-SCNC: 10 MMOL/L (ref 3–15)
AST SERPL W P-5'-P-CCNC: 26 U/L (ref 10–37)
BASOPHILS # BLD: 0 K/UL (ref 0–0.2)
BASOPHILS NFR BLD: 0 % (ref 0–2)
BILIRUB SERPL-MCNC: 0.1 MG/DL (ref 0.2–1.2)
BILIRUBIN, DIRECT,CBIL: <0.2 MG/DL (ref 0–0.3)
BUN SERPL-MCNC: 17 MG/DL (ref 6–22)
CALCIUM SERPL-MCNC: 8.6 MG/DL (ref 8.4–10.5)
CHLORIDE SERPL-SCNC: 107 MMOL/L (ref 98–110)
CO2 SERPL-SCNC: 21 MMOL/L (ref 20–32)
CREAT SERPL-MCNC: 1.4 MG/DL (ref 0.8–1.4)
EOSINOPHIL # BLD: 0 K/UL (ref 0–0.5)
EOSINOPHIL NFR BLD: 1 % (ref 0–6)
ERYTHROCYTE [DISTWIDTH] IN BLOOD BY AUTOMATED COUNT: 13.3 % (ref 10–15.5)
GLOBULIN,GLOB: 2.6 G/DL (ref 2–4)
GLOMERULAR FILTRATION RATE: 39.9 ML/MIN/1.73 SQ.M.
GLUCOSE SERPL-MCNC: 100 MG/DL (ref 70–99)
GRANULOCYTES,GRANS: 41 % (ref 40–75)
HCT VFR BLD AUTO: 40.6 % (ref 35.1–48.3)
HGB BLD-MCNC: 12.4 G/DL (ref 11.7–16.1)
LYMPHOCYTES, LYMLT: 51 % (ref 20–45)
MAGNESIUM SERPL-MCNC: 2.5 MG/DL (ref 1.6–2.5)
MCH RBC QN AUTO: 29 PG (ref 26–34)
MCHC RBC AUTO-ENTMCNC: 31 G/DL (ref 31–36)
MCV RBC AUTO: 95 FL (ref 80–99)
MONOCYTES # BLD: 0.4 K/UL (ref 0.1–1)
MONOCYTES NFR BLD: 7 % (ref 3–12)
NEUTROPHILS # BLD AUTO: 2.2 K/UL (ref 1.8–7.7)
PLATELET # BLD AUTO: 227 K/UL (ref 140–440)
PMV BLD AUTO: 10.4 FL (ref 9–13)
POTASSIUM SERPL-SCNC: 5.5 MMOL/L (ref 3.5–5.5)
PROT SERPL-MCNC: 6.4 G/DL (ref 6.2–8.1)
RAPAMYCIN (SIROLIMUS): 4.9 NG/ML (ref 3–20)
RBC # BLD AUTO: 4.28 M/UL (ref 3.8–5.2)
SODIUM SERPL-SCNC: 138 MMOL/L (ref 133–145)
WBC # BLD AUTO: 5.5 K/UL (ref 4–11)

## 2022-08-08 ENCOUNTER — OFFICE VISIT (OUTPATIENT)
Dept: HEMATOLOGY | Age: 67
End: 2022-08-08
Payer: MEDICARE

## 2022-08-08 VITALS
TEMPERATURE: 97 F | WEIGHT: 204 LBS | HEIGHT: 66 IN | HEART RATE: 74 BPM | OXYGEN SATURATION: 98 % | DIASTOLIC BLOOD PRESSURE: 62 MMHG | BODY MASS INDEX: 32.78 KG/M2 | SYSTOLIC BLOOD PRESSURE: 127 MMHG

## 2022-08-08 DIAGNOSIS — Z79.899 LONG TERM CURRENT USE OF IMMUNOSUPPRESSIVE DRUG: ICD-10-CM

## 2022-08-08 DIAGNOSIS — Z94.4 S/P LIVER TRANSPLANT (HCC): Primary | ICD-10-CM

## 2022-08-08 DIAGNOSIS — Z94.4 H/O LIVER TRANSPLANT (HCC): Primary | ICD-10-CM

## 2022-08-08 PROCEDURE — 1101F PT FALLS ASSESS-DOCD LE1/YR: CPT | Performed by: NURSE PRACTITIONER

## 2022-08-08 PROCEDURE — 3017F COLORECTAL CA SCREEN DOC REV: CPT | Performed by: NURSE PRACTITIONER

## 2022-08-08 PROCEDURE — 1123F ACP DISCUSS/DSCN MKR DOCD: CPT | Performed by: NURSE PRACTITIONER

## 2022-08-08 PROCEDURE — G8427 DOCREV CUR MEDS BY ELIG CLIN: HCPCS | Performed by: NURSE PRACTITIONER

## 2022-08-08 PROCEDURE — G8536 NO DOC ELDER MAL SCRN: HCPCS | Performed by: NURSE PRACTITIONER

## 2022-08-08 PROCEDURE — 1090F PRES/ABSN URINE INCON ASSESS: CPT | Performed by: NURSE PRACTITIONER

## 2022-08-08 PROCEDURE — G8417 CALC BMI ABV UP PARAM F/U: HCPCS | Performed by: NURSE PRACTITIONER

## 2022-08-08 PROCEDURE — 99214 OFFICE O/P EST MOD 30 MIN: CPT | Performed by: NURSE PRACTITIONER

## 2022-08-08 PROCEDURE — G8432 DEP SCR NOT DOC, RNG: HCPCS | Performed by: NURSE PRACTITIONER

## 2022-08-08 PROCEDURE — G8400 PT W/DXA NO RESULTS DOC: HCPCS | Performed by: NURSE PRACTITIONER

## 2022-08-08 PROCEDURE — G0463 HOSPITAL OUTPT CLINIC VISIT: HCPCS | Performed by: NURSE PRACTITIONER

## 2022-08-08 NOTE — PROGRESS NOTES
[address] [facility], MD, [address], [place], [place]      ZAKI Gil, [facility]     Marilyn Yung, [facility]   CELIA CotaP-ROZINA Garcia, [facility]       Marybel Pryor [facility] 136    at [address], [address] [place] [address], [facility] 22.    [phone]    FAX: [address] [place] [address], [address]    [phone]    FAX: [phone]       Patient Care Team:  Zafar Thomas MD as PCP - General (Family Medicine)  Jina Parson MD (Inactive) as Physician (Surgery Physician)  Radha Jacobson MD as Physician (Obstetrics & Gynecology)  Carlitos Szymanski MD as Physician (Neurosurgery)  Atul Turner DDS as Physician (Dental General Practice)  Saturnino Goins O.D. as Physician (Optometry)  Kev Sanchez MD (Gastroenterology)  Jam Perez MD (Internal Medicine Physician)  Paramjit Mays MD (Hematology and Oncology)  Gertrude Black DO (Rheumatology Internal Medicine)  Ashli Haider MD (Dermatology Physician)  Tony Vick MD (Endocrinology Physician)  Seema Rodrigues DPM (Podiatry)  Caio Husain, RN as Nurse Navigator (Hepatology)        Problem List  Date Reviewed: 5/5/2022            Codes Class Noted    Long term current use of immunosuppressive drug ICD-10-CM: Z79.899  ICD-9-CM: V58.69  4/18/2018        H/O liver transplant [facility]) ICD-10-CM: Z94.4  ICD-9-CM: V42.7  65/51/5853        Complication of transplanted liver [facility]) ICD-10-CM: T86.40  ICD-9-CM: 996.82  11/13/2013        Back pain, lumbosacral ICD-10-CM: M54.50  ICD-9-CM: 724.2, 724.6  1/27/2013        Diabetes mellitus ([facility] ICD-10-CM: E11.9  ICD-9-CM: 250.00  5/28/2012        Colon polyps ICD-10-CM: K63.5  ICD-9-CM: 211.3  5/28/2012        Breast cancer ([facility] ICD-10-CM: C50.919  ICD-9-CM: 174.9  5/28/2012    Overview Signed 5/28/2012  7:00 AM by Robert Carreon MD     Masectomy, chemotherapy,XRT. 1996  Tamoxifen [id number]               H/O shoulder surgery ICD-10-CM: Z98.890  ICD-9-CM: V45.89  5/28/2012    Overview Signed 5/28/2012  7:07 AM by Robert Carreon MD     12/2011                Niko Charles returns to the [facility] for fibroscan assessment of hepatic fibrosis and for management of liver allograft function, to adjust immune suppression. The active problem list, all pertinent past medical history, medications, liver histology, endoscopic studies, radiologic findings and laboratory findings related to the liver disorder were reviewed with the patient. The patient underwent liver transplantation at [place], [place] in 4/2013. The patient is currently receiving sirolimus, cellcept for immune suppression. The patient had an acute graft rejection in 10/2017. The patient has no symptoms which can be attributed to the liver disorder. The patient has not experienced fatigue, fevers, chills. The patient completes all daily activities without any functional limitations. All of the issues listed in the Assessment and Plan were discussed with the patient. All questions were answered. Since the last office appointment:  Seeing Dr. Ariella Mcpherson of endocrinology. Now using \"omnipod\" to regulate blood sugars. Had labs. 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   The date of the liver transplant was 4/2013    The most recent laboratory studies indicate that the liver transaminases are normal, alkaline phosphatase is normal, tests of hepatic synthetic and metabolic function are normal, and the platelet count is normal.      A liver biopsy from 11/2017 demonstrated acute rejection. Rejection was treated with high dose steroids and taper. A liver biopsy in 6/2018 demonstrated fatty liver with no rejection. Immune Suppression  The patient is receiving immune suppression with sirolimus which is being well tolerated at 1 mg/day. The immune suppression blood level is in the proper range. Sirolimus was 4.9 ng/ml on 07/29/2022. We will continue the Cellcept at 250 mg bid and 1 mg sirolimus bid for now. NAFL  The diagnosis was made by liver biopsy in 6/2018. The need to perform an assessment of liver fibrosis was discussed with the patient. The Fibroscan can assess liver fibrosis and determine if a patient has advanced fibrosis or cirrhosis without the need for liver biopsy. The fibroscan was repeated today. Today's fibroscan results suggest a Metavir fibrosis score of F0. There is evidence of fatty liver per the scan. The Fibroscan can be repeated annually or as often as clinically indicated to assess for fibrosis progression and/or regression. The patient was counseled regarding diet and exercise to achieve weight loss. The best diet for patients with fatty liver is one very low in carbohydrates and enriched with protein such as an Martha[employer] program.    The patient was told not to consume any food or drinks products containing fructose as this enhances hepatic fat synthesis. Acute and chronic kidney injury   This is a common adverse event of immune suppression. The Screat is stable in the 1.4 mg range. Aspirin and NSAIDs should be avoided since these agents can worsen renal insufficiency. Hypercholesterolemia   This can be caused by immune suppression. Serum cholesterol is normal and does not need to be treated at this time. Hypertension   This is a common side effect of immune suppression. Blood pressure is well controlled on the current treatment. Low serum magnesium   This is a common side effect of immune suppression.     The patient has a normal magnesium level and does not need supplementation. Osteoporosis   Osteoporosis is common in patients with cirhrosis prior to liver transplant. The patient had osteoporosis on DEXA scan from 2013 prior to the LT. The patient still had osteoporosis on DEXA scan from 10/2015. The patient was advised to take calcium supplementation and Vitamin D. The patient is taking prolia since 11/14/2018. The most recent DEXA in 7/2020 still showed osteoporosis. Monitoring for skin Cancer  The patient was counseled regarding increased risk of skin cancer in transplant recipients and need to have any new skin lesions evaluated by dermatology and removed if suspicious. The patient was instructed to see Dermatology annually examination. Colon polyps  The patient has had colon polyps in the past.  The last colonoscopy was in 2016 with Dr Liborio Saleem. Most recent 12/17/2020. Clear    Vaccinations  Routine vaccinations against other bacterial and viral agents can be performed as long as this is with attentuatted virus. Had two vaccinations and a booster against COVID. Live virus vaccines should not be administered. Annual flu vaccination should be administered. ALLERGIES:  Allergies   Allergen Reactions    Tape [Adhesive] Other (comments)     Pulls skin off    Percocet [Oxycodone-Acetaminophen] Shortness of Breath, Itching and Other (comments)     \"Burning skin\"    Statins-Hmg-Coa Reductase Inhibitors Other (comments)     liver     MEDICATIONS:  Current Outpatient Medications   Medication Sig    furosemide (LASIX) 40 mg tablet Take 1 Tablet by mouth daily. mycophenolate (CELLCEPT) 500 mg tablet Take 2 tablets by mouth twice daily    insulin aspart U-100 (NovoLOG Flexpen U-100 Insulin) 100 unit/mL (3 mL) inpn by SubCUTAneous route.     sirolimus (RAPAMUNE) 1 mg tablet Take 4 tabs by mouth daily    ondansetron (ZOFRAN ODT) 4 mg disintegrating tablet Take 1 Tab by mouth every three to four (3-4) hours as needed for Nausea or Vomiting. butalbital-aspirin-caffeine (FIORINAL) capsule butalbital-aspirin-caffeine 50 mg-325 mg-40 mg capsule    lisinopril (PRINIVIL, ZESTRIL) 10 mg tablet Take 10 mg by mouth daily. pantoprazole (PROTONIX) 40 mg tablet pantoprazole 40 mg tablet,delayed release    denosumab (PROLIA) 60 mg/mL injection 60 mg by SubCUTAneous route. metoprolol tartrate (LOPRESSOR) 50 mg tablet Take 25 mg by mouth two (2) times a day. No current facility-administered medications for this visit. SYSTEM REVIEW NOT RELATED TO LIVER DISEASE OR REVIEWED ABOVE:  Constitution systems: Weight gain with steroids. Eyes: Negative for visual changes. ENT: Negative for sore throat, painful swallowing. Respiratory: Negative for cough, hemoptysis, SOB. Cardiology: Negative for chest pain, palpitations. GI:  Negative for constipation or diarrhea. : Negative for urinary frequency, dysuria, hematuria, nocturia. Skin: Negative for rash. Hematology: Negative for easy bruising, blood clots. Musculo-skelatal: Negative for muscle pain or weakness. Neurologic: Negative for headaches, dizziness, vertigo, memory problems not related to HE. Psychology: Negative for anxiety, depression. FAMILY HISTORY:  There is no family history of liver disease. SOCIAL HISTORY:  The patient is . There are 2 children and 2 grandchildren. The patient has never used tobacco products. The patient has never consumed significant amounts of alcohol. The patient used to work for [employer] and then as an  for [employer].  She has not worked since the liver transplant. PHYSICAL EXAMINATION:  Visit Vitals  /62   Pulse 74   Temp 97 °F (36.1 °C)   Ht 5' 6\" (1.676 m)   Wt 204 lb (92.5 kg)   SpO2 98%   BMI 32.93 kg/m²       General: Appears healthy. No acute distress. Eyes: Sclera anicteric. ENT: No oral lesions.   Thyroid normal.  Nodes: No adenopathy. Skin: No spider angiomata. No jaundice. No palmar erythema. Respiratory: Lungs clear to auscultation. Cardiovascular: Regular heart rate. No murmurs. No JVD. Abdomen:   Well healed liver transplant incision. Soft non-tender. Liver size normal to percussion/palpation. Spleen not palpable. No obvious ascites. Extremities: No lower edema. No muscle wasting. Neurologic:  Alert and oriented. Cranial nerves grossly intact. No asterixsis. LAB STUDIES:  [facility] of [address]  [address] Units 2022   WBC 4.0 - 11.0 K/uL 5.5 5.7   ANC 1.8 - 7.7 K/uL 2.2 2.3   HGB 11.7 - 16.1 g/dL 12.4 12.2    - 440 K/uL 227 246   INR 0.89 - 1.29     AST 10 - 37 U/L 26 20   ALT 5 - 40 U/L 25 23   Alk Phos 40 - 120 U/L 63 64   Bili, Total 0.2 - 1.2 mg/dL 0.1 (L) 0.2   Bili, Direct 0.0 - 0.3 mg/dL <0.2 <0.2   Albumin 3.5 - 5.0 g/dL 3.8 3.9   BUN 6 - 22 mg/dL 17 18   Creat 0.8 - 1.4 mg/dL 1.4 1.4   Na 133 - 145 mmol/L 138 141   K 3.5 - 5.5 mmol/L 5.5 4.8   Cl 98 - 110 mmol/L 107 108   CO2 20 - 32 mmol/L 21 23   Glucose 70 - 99 mg/dL 100 (H) 76   Magnesium 1.6 - 2.5 mg/dL 2.5 2.5     [facility] [place] of [address] [place] [address] Ref Rng & Units 2022   WBC 4.0 - 11.0 K/uL 5.6   ANC 1.8 - 7.7 K/uL 2.8   HGB 11.7 - 16.1 g/dL 12.8    - 440 K/uL 214   INR 0.89 - 1.29    AST 10 - 37 U/L 22   ALT 5 - 40 U/L 19   Alk Phos 40 - 120 U/L 59   Bili, Total 0.2 - 1.2 mg/dL 0.2   Bili, Direct 0.0 - 0.3 mg/dL <0.2   Albumin 3.5 - 5.0 g/dL 3.8   BUN 6 - 22 mg/dL 24 (H)   Creat 0.8 - 1.4 mg/dL 1.4   Na 133 - 145 mmol/L 139   K 3.5 - 5.5 mmol/L 5.1   Cl 98 - 110 mmol/L 107   CO2 20 - 32 mmol/L 22   Glucose 70 - 99 mg/dL 90   Magnesium 1.6 - 2.5 mg/dL 2.7 (H)     From 2022:  AST/ ALT/ ALP/ T. BILI/ ALB: 17/ / 58/ 0.2/ 3.5  BUN/ CRT/ PLT:  20 1.5/  228,000  M.0  SIR: 3.5    SEROLOGIES:  2012.   HAV total negative, HBsAntigen negative, anti-HBcore negative, anti-HBsurface negative, anti-HCV negative, Ferritin 20, NEVA negative, ASMA negative, AMA negative, ceruloplsmin 34, alpha-1-antitrypsin 195, A1AT phenotype MM.  2/2013. Ferritin 434, ASMA weak positive, CMV IgG positive, EBV IgG positive, anti-HIV negative, HBA1C 5.1    LIVER HISTOLOGY:  11/2017. Slides reviewed by MLS. Acute graft rejection. 6/2018. Slides reviewed by MLS. NAFL. 40% macro and micovesicular steatosis. No ballooning. No inflammation. No fibrosis. No rejection. 05/2022. FibroScan performed at [address]. EkPa was 5.9. IQR/med 22%. . The results suggested a fibrosis level of F0. The CAP score suggests there is hepatic steatosis. ENDOSCOPIC PROCEDURES:  1/2012. EGD by Dr Yanelis Chau. Esophageal varices. RADIOLOGY:  1/2012. CT scan abdomen with and without IV contrast.  Changes consistent with cirrhosis. No liver mass lesions. No dilated bile ducts. No bile duct strictures. Varices. Generalized ascites. 07/2012:  Ultrasound of the liver. Echogenic consistent with chronic liver disease, cirrhosis. No liver mass lesions. No ascites  11/2012: Ultrasound of the liver. Echogenic consistent with chronic liver disease, cirrhosis. No liver mass lesions. Small amount ascites present. 1/2013. Ultrasound of liver. Echogenic consistent with cirrhosis. No liver mass lesions. No dilated bile ducts. Mild ascites. 11/2013. Ultrasound of liver. Normal appearing liver. No liver mass lesions. 12/2013:  MRI of abdomen. Questionable small focal stenosis of distal common hepatic duct. Focal stenosis in mid-portal vein. 02/2015. Ultrasound of the liver. Consistent with cirrhosis. 06/2018. Ultrasound of the liver. Mixed echogenicity, complex collection in the medial right hepatic lobe most in keeping with hematoma. 05/2019. Pelvic MRI w/wo contrast.  Enhancing endocervical canal 1.5 cm lesion. 02/2020. Abdominal Ultrasound.   The liver appeared diffusely echogenic. No solid mass. OTHER TESTING:.   2/2013. ETOH negative. 8/2013:  DEXA scan - osteoporosis   10/2015. DEXA scan osteoporosis. 8/2018. TFTS. TSH 0.38/T3 free 3.4/T4 free 1.3    25 minutes total time spent with this patient with more than 50% of this time spent counseling and coordinating care as described above. FOLLOW-UP:  All of the issues listed above in the Assessment and Plan were discussed with the patient. All questions were answered. The patient expressed a clear understanding of the above. [address] in 3 months for continued routine monitoring.      JOSH GreenbergC  Liver [place] of [address], [address] [place] [address]   [phone]

## 2022-08-26 LAB
A-G RATIO,AGRAT: 1.4 RATIO (ref 1.1–2.6)
ABSOLUTE LYMPHOCYTE COUNT, 10803: 2.6 K/UL (ref 1–4.8)
ALBUMIN SERPL-MCNC: 3.8 G/DL (ref 3.5–5)
ALP SERPL-CCNC: 58 U/L (ref 40–120)
ALT SERPL-CCNC: 22 U/L (ref 5–40)
ANION GAP SERPL CALC-SCNC: 12 MMOL/L (ref 3–15)
AST SERPL W P-5'-P-CCNC: 26 U/L (ref 10–37)
BASOPHILS # BLD: 0 K/UL (ref 0–0.2)
BASOPHILS NFR BLD: 0 % (ref 0–2)
BILIRUB SERPL-MCNC: 0.2 MG/DL (ref 0.2–1.2)
BILIRUBIN, DIRECT,CBIL: <0.2 MG/DL (ref 0–0.3)
BUN SERPL-MCNC: 24 MG/DL (ref 6–22)
CALCIUM SERPL-MCNC: 8.5 MG/DL (ref 8.4–10.5)
CHLORIDE SERPL-SCNC: 105 MMOL/L (ref 98–110)
CO2 SERPL-SCNC: 23 MMOL/L (ref 20–32)
CREAT SERPL-MCNC: 1.5 MG/DL (ref 0.8–1.4)
EOSINOPHIL # BLD: 0 K/UL (ref 0–0.5)
EOSINOPHIL NFR BLD: 1 % (ref 0–6)
ERYTHROCYTE [DISTWIDTH] IN BLOOD BY AUTOMATED COUNT: 12.7 % (ref 10–15.5)
GLOBULIN,GLOB: 2.8 G/DL (ref 2–4)
GLOMERULAR FILTRATION RATE: 36.8 ML/MIN/1.73 SQ.M.
GLUCOSE SERPL-MCNC: 80 MG/DL (ref 70–99)
GRANULOCYTES,GRANS: 41 % (ref 40–75)
HCT VFR BLD AUTO: 39.4 % (ref 35.1–48.3)
HGB BLD-MCNC: 12.4 G/DL (ref 11.7–16.1)
LYMPHOCYTES, LYMLT: 49 % (ref 20–45)
MAGNESIUM SERPL-MCNC: 2.6 MG/DL (ref 1.6–2.5)
MCH RBC QN AUTO: 29 PG (ref 26–34)
MCHC RBC AUTO-ENTMCNC: 32 G/DL (ref 31–36)
MCV RBC AUTO: 93 FL (ref 80–99)
MONOCYTES # BLD: 0.5 K/UL (ref 0.1–1)
MONOCYTES NFR BLD: 8 % (ref 3–12)
NEUTROPHILS # BLD AUTO: 2.2 K/UL (ref 1.8–7.7)
PLATELET # BLD AUTO: 233 K/UL (ref 140–440)
PMV BLD AUTO: 10.2 FL (ref 9–13)
POTASSIUM SERPL-SCNC: 4.9 MMOL/L (ref 3.5–5.5)
PROT SERPL-MCNC: 6.6 G/DL (ref 6.2–8.1)
RBC # BLD AUTO: 4.24 M/UL (ref 3.8–5.2)
SODIUM SERPL-SCNC: 140 MMOL/L (ref 133–145)
WBC # BLD AUTO: 5.3 K/UL (ref 4–11)

## 2022-08-27 LAB — RAPAMYCIN (SIROLIMUS): 3.4 NG/ML (ref 3–20)

## 2022-08-29 ENCOUNTER — TELEPHONE (OUTPATIENT)
Dept: HEMATOLOGY | Age: 67
End: 2022-08-29

## 2022-08-29 NOTE — TELEPHONE ENCOUNTER
Left voicemail message on patients phone regarding her recent lab results which are all within normal limits. Asked patient to call if she has any questions or concerns.

## 2022-09-12 RX ORDER — MYCOPHENOLATE MOFETIL 500 MG/1
TABLET ORAL
Qty: 120 TABLET | Refills: 0 | Status: SHIPPED | OUTPATIENT
Start: 2022-09-12 | End: 2022-09-14 | Stop reason: SDUPTHER

## 2022-09-14 RX ORDER — MYCOPHENOLATE MOFETIL 500 MG/1
1000 TABLET ORAL 2 TIMES DAILY
Qty: 120 TABLET | Refills: 0 | Status: SHIPPED | OUTPATIENT
Start: 2022-09-14

## 2022-09-21 ENCOUNTER — TELEPHONE (OUTPATIENT)
Dept: HEMATOLOGY | Age: 67
End: 2022-09-21

## 2022-09-21 NOTE — TELEPHONE ENCOUNTER
Patient called to inquire if there were any contraindications for having a COVID booster, flu vaccine and

## 2022-09-23 LAB
A-G RATIO,AGRAT: 1.4 RATIO (ref 1.1–2.6)
ABSOLUTE LYMPHOCYTE COUNT, 10803: 4.7 K/UL (ref 1–4.8)
ALBUMIN SERPL-MCNC: 3.9 G/DL (ref 3.5–5)
ALP SERPL-CCNC: 59 U/L (ref 40–120)
ALT SERPL-CCNC: 21 U/L (ref 5–40)
ANION GAP SERPL CALC-SCNC: 14 MMOL/L (ref 3–15)
AST SERPL W P-5'-P-CCNC: 23 U/L (ref 10–37)
BASOPHILS # BLD: 0 K/UL (ref 0–0.2)
BASOPHILS NFR BLD: 1 % (ref 0–2)
BILIRUB SERPL-MCNC: 0.2 MG/DL (ref 0.2–1.2)
BILIRUBIN, DIRECT,CBIL: <0.2 MG/DL (ref 0–0.3)
BUN SERPL-MCNC: 25 MG/DL (ref 6–22)
CALCIUM SERPL-MCNC: 8.8 MG/DL (ref 8.4–10.5)
CHLORIDE SERPL-SCNC: 104 MMOL/L (ref 98–110)
CO2 SERPL-SCNC: 22 MMOL/L (ref 20–32)
CREAT SERPL-MCNC: 1.6 MG/DL (ref 0.8–1.4)
EOSINOPHIL # BLD: 0.1 K/UL (ref 0–0.5)
EOSINOPHIL NFR BLD: 1 % (ref 0–6)
ERYTHROCYTE [DISTWIDTH] IN BLOOD BY AUTOMATED COUNT: 13 % (ref 10–15.5)
GLOBULIN,GLOB: 2.8 G/DL (ref 2–4)
GLOMERULAR FILTRATION RATE: 34.1 ML/MIN/1.73 SQ.M.
GLUCOSE SERPL-MCNC: 61 MG/DL (ref 70–99)
GRANULOCYTES,GRANS: 35 % (ref 40–75)
HCT VFR BLD AUTO: 38.1 % (ref 35.1–48.3)
HGB BLD-MCNC: 12.7 G/DL (ref 11.7–16.1)
LYMPHOCYTES, LYMLT: 57 % (ref 20–45)
MAGNESIUM SERPL-MCNC: 2.4 MG/DL (ref 1.6–2.5)
MCH RBC QN AUTO: 30 PG (ref 26–34)
MCHC RBC AUTO-ENTMCNC: 33 G/DL (ref 31–36)
MCV RBC AUTO: 91 FL (ref 80–99)
MONOCYTES # BLD: 0.6 K/UL (ref 0.1–1)
MONOCYTES NFR BLD: 7 % (ref 3–12)
NEUTROPHILS # BLD AUTO: 2.9 K/UL (ref 1.8–7.7)
PLATELET # BLD AUTO: 251 K/UL (ref 140–440)
PMV BLD AUTO: 9.8 FL (ref 9–13)
POTASSIUM SERPL-SCNC: 4.2 MMOL/L (ref 3.5–5.5)
PROT SERPL-MCNC: 6.7 G/DL (ref 6.2–8.1)
RBC # BLD AUTO: 4.21 M/UL (ref 3.8–5.2)
SODIUM SERPL-SCNC: 140 MMOL/L (ref 133–145)
WBC # BLD AUTO: 8.2 K/UL (ref 4–11)

## 2022-09-26 ENCOUNTER — TELEPHONE (OUTPATIENT)
Dept: HEMATOLOGY | Age: 67
End: 2022-09-26

## 2022-09-26 NOTE — TELEPHONE ENCOUNTER
LVM for patient regarding recent lab results from 9/23/22. Informed patient her labs look stable, still waiting for sirolimus level. If sirolimus level is outside of the norm, will give her a call. Left phone number for patient to call back if any questions.

## 2022-09-27 ENCOUNTER — TELEPHONE (OUTPATIENT)
Dept: HEMATOLOGY | Age: 67
End: 2022-09-27

## 2022-09-27 LAB — RAPAMYCIN (SIROLIMUS): 3.1 NG/ML (ref 3–20)

## 2022-09-27 NOTE — TELEPHONE ENCOUNTER
Called patient to review recent sirolimus lab results which is within normal limits. Pt verbally confirmed understanding.

## 2022-10-14 LAB
A-G RATIO,AGRAT: 1.8 RATIO (ref 1.1–2.6)
ABSOLUTE LYMPHOCYTE COUNT, 10803: 2 K/UL (ref 1–4.8)
ALBUMIN SERPL-MCNC: 3.9 G/DL (ref 3.5–5)
ALP SERPL-CCNC: 56 U/L (ref 40–120)
ALT SERPL-CCNC: 17 U/L (ref 5–40)
ANION GAP SERPL CALC-SCNC: 11 MMOL/L (ref 3–15)
AST SERPL W P-5'-P-CCNC: 20 U/L (ref 10–37)
BASOPHILS # BLD: 0 K/UL (ref 0–0.2)
BASOPHILS NFR BLD: 1 % (ref 0–2)
BILIRUB SERPL-MCNC: 0.2 MG/DL (ref 0.2–1.2)
BILIRUBIN, DIRECT,CBIL: <0.2 MG/DL (ref 0–0.3)
BUN SERPL-MCNC: 19 MG/DL (ref 6–22)
CALCIUM SERPL-MCNC: 8.7 MG/DL (ref 8.4–10.5)
CHLORIDE SERPL-SCNC: 109 MMOL/L (ref 98–110)
CO2 SERPL-SCNC: 22 MMOL/L (ref 20–32)
CREAT SERPL-MCNC: 1.5 MG/DL (ref 0.8–1.4)
EOSINOPHIL # BLD: 0 K/UL (ref 0–0.5)
EOSINOPHIL NFR BLD: 1 % (ref 0–6)
ERYTHROCYTE [DISTWIDTH] IN BLOOD BY AUTOMATED COUNT: 12.8 % (ref 10–15.5)
GLOBULIN,GLOB: 2.2 G/DL (ref 2–4)
GLOMERULAR FILTRATION RATE: 36.6 ML/MIN/1.73 SQ.M.
GLUCOSE SERPL-MCNC: 96 MG/DL (ref 70–99)
GRANULOCYTES,GRANS: 44 % (ref 40–75)
HCT VFR BLD AUTO: 36.4 % (ref 35.1–48.3)
HGB BLD-MCNC: 11.7 G/DL (ref 11.7–16.1)
LYMPHOCYTES, LYMLT: 46 % (ref 20–45)
MAGNESIUM SERPL-MCNC: 2.3 MG/DL (ref 1.6–2.5)
MCH RBC QN AUTO: 29 PG (ref 26–34)
MCHC RBC AUTO-ENTMCNC: 32 G/DL (ref 31–36)
MCV RBC AUTO: 91 FL (ref 80–99)
MONOCYTES # BLD: 0.4 K/UL (ref 0.1–1)
MONOCYTES NFR BLD: 8 % (ref 3–12)
NEUTROPHILS # BLD AUTO: 1.9 K/UL (ref 1.8–7.7)
PLATELET # BLD AUTO: 235 K/UL (ref 140–440)
PMV BLD AUTO: 10.1 FL (ref 9–13)
POTASSIUM SERPL-SCNC: 4.4 MMOL/L (ref 3.5–5.5)
PROGRAF LEVEL (FK506), 10002: <2 NG/ML (ref 2–20)
PROT SERPL-MCNC: 6.1 G/DL (ref 6.2–8.1)
RAPAMYCIN (SIROLIMUS): 3.7 NG/ML (ref 3–20)
RBC # BLD AUTO: 4 M/UL (ref 3.8–5.2)
SODIUM SERPL-SCNC: 142 MMOL/L (ref 133–145)
WBC # BLD AUTO: 4.3 K/UL (ref 4–11)

## 2022-10-24 ENCOUNTER — OFFICE VISIT (OUTPATIENT)
Dept: HEMATOLOGY | Age: 67
End: 2022-10-24
Payer: MEDICARE

## 2022-10-24 VITALS
TEMPERATURE: 98.2 F | WEIGHT: 204 LBS | DIASTOLIC BLOOD PRESSURE: 54 MMHG | HEIGHT: 66 IN | HEART RATE: 69 BPM | SYSTOLIC BLOOD PRESSURE: 118 MMHG | BODY MASS INDEX: 32.78 KG/M2 | OXYGEN SATURATION: 98 %

## 2022-10-24 DIAGNOSIS — Z94.4 S/P LIVER TRANSPLANT (HCC): Primary | ICD-10-CM

## 2022-10-24 PROCEDURE — 1101F PT FALLS ASSESS-DOCD LE1/YR: CPT | Performed by: NURSE PRACTITIONER

## 2022-10-24 PROCEDURE — G8417 CALC BMI ABV UP PARAM F/U: HCPCS | Performed by: NURSE PRACTITIONER

## 2022-10-24 PROCEDURE — 99214 OFFICE O/P EST MOD 30 MIN: CPT | Performed by: NURSE PRACTITIONER

## 2022-10-24 PROCEDURE — G0463 HOSPITAL OUTPT CLINIC VISIT: HCPCS | Performed by: NURSE PRACTITIONER

## 2022-10-24 PROCEDURE — G8432 DEP SCR NOT DOC, RNG: HCPCS | Performed by: NURSE PRACTITIONER

## 2022-10-24 PROCEDURE — 1123F ACP DISCUSS/DSCN MKR DOCD: CPT | Performed by: NURSE PRACTITIONER

## 2022-10-24 PROCEDURE — G8536 NO DOC ELDER MAL SCRN: HCPCS | Performed by: NURSE PRACTITIONER

## 2022-10-24 PROCEDURE — G8427 DOCREV CUR MEDS BY ELIG CLIN: HCPCS | Performed by: NURSE PRACTITIONER

## 2022-10-24 PROCEDURE — 1090F PRES/ABSN URINE INCON ASSESS: CPT | Performed by: NURSE PRACTITIONER

## 2022-10-24 PROCEDURE — G8400 PT W/DXA NO RESULTS DOC: HCPCS | Performed by: NURSE PRACTITIONER

## 2022-10-24 PROCEDURE — 3017F COLORECTAL CA SCREEN DOC REV: CPT | Performed by: NURSE PRACTITIONER

## 2022-10-24 NOTE — PROGRESS NOTES
[facility] [place] [facility], Janene NELSON, [place], [place]      ZAKI Oconnor, ACNP-BC     Marilyn GU Aga, [facility]   Yrn Alicia FNP-ROZINA Dawson, [facility]       Marybel Pryor [facility] [address] [place] [address]    at [address], [address] [place] [address], [facility] 22. [phone]    FAX: [address] [place] [address], [address], [address]    [phone]    FAX: [phone]       Patient Care Team:  Krysten Tam MD as PCP - General (Family Medicine)  Sita Grullon MD as Physician (Surgery Physician)  Sonya Toro MD as Physician (Obstetrics & Gynecology)  Virgil Yung MD as Physician (Neurosurgery)  Sima Marr DDS as Physician (Dental General Practice)  Noah Sarabia O.D. as Physician (Optometry)  Maribeth Pinto MD (Gastroenterology)  Earl Alexander MD (Internal Medicine Physician)  Conrado Lloyd MD (Hematology and Oncology)  Gustavo Hauser DO (Rheumatology Internal Medicine)  Albert Miller MD (Dermatology Physician)  Blenda Pallas, MD (Endocrinology Physician)  Alyson Palma DPM (Podiatry)  Torsten Kidd, RN as Nurse Navigator (Hepatology)        Problem List  Date Reviewed: 8/8/2022            Codes Class Noted    Long term current use of immunosuppressive drug ICD-10-CM: Z79.899  ICD-9-CM: V58.69  4/18/2018        H/O liver transplant [facility]) ICD-10-CM: Z94.4  ICD-9-CM: V42.7  16/39/0897        Complication of transplanted liver [facility]) ICD-10-CM: T86.40  ICD-9-CM: 996.82  11/13/2013        Back pain, lumbosacral ICD-10-CM: M54.50  ICD-9-CM: 724.2, 724.6  1/27/2013        Diabetes mellitus ([facility] ICD-10-CM: E11.9  ICD-9-CM: 250.00  5/28/2012        Colon polyps ICD-10-CM: X90.4  ICD-9-CM: 211.3 5/28/2012        Breast cancer ([facility] ICD-10-CM: C50.919  ICD-9-CM: 174.9  5/28/2012    Overview Signed 5/28/2012  7:00 AM by Henrik Ovalle MD     Masectomy, chemotherapy,XRT. 1996  Tamoxifen [id number]               H/O shoulder surgery ICD-10-CM: Z98.890  ICD-9-CM: V45.89  5/28/2012    Overview Signed 5/28/2012  7:07 AM by Henrik Ovalle MD     12/2011                Bennie Chang returns to the [facility] for management of liver allograft function, to adjust immune suppression. The active problem list, all pertinent past medical history, medications, liver histology, endoscopic studies, radiologic findings and laboratory findings related to the liver disorder were reviewed with the patient. The patient underwent liver transplantation at [place], [place] in 4/2013. The patient is currently receiving sirolimus, cellcept for immune suppression. The patient had an acute graft rejection in 10/2017. The patient has no symptoms which can be attributed to the liver disorder. The patient has not experienced fatigue, fevers, chills. The patient completes all daily activities without any functional limitations. All of the issues listed in the Assessment and Plan were discussed with the patient. All questions were answered. Since the last office appointment:  Seeing Dr. Ramses Santo of endocrinology. Now using \"omnipod\" to regulate blood sugars. Had labs. 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 The date of the liver transplant was 4/2013    The most recent laboratory studies indicate that the liver transaminases are normal, alkaline phosphatase is normal, tests of hepatic synthetic and metabolic function are normal, and the platelet count is normal.      A liver biopsy from 11/2017 demonstrated acute rejection. Rejection was treated with high dose steroids and taper.     A liver biopsy in 6/2018 demonstrated fatty liver with no rejection. Immune Suppression  The patient is receiving immune suppression with sirolimus which is being well tolerated at 1 mg/day. The immune suppression blood level is in the proper range. Sirolimus was 3.7 ng/ml on 10/14/2022. \We will continue the Cellcept at 250 mg bid and 1 mg sirolimus daily for now. NAFL  The diagnosis was made by liver biopsy in 6/2018. The need to perform an assessment of liver fibrosis was discussed with the patient. The Fibroscan can assess liver fibrosis and determine if a patient has advanced fibrosis or cirrhosis without the need for liver biopsy. The fibroscan was repeated today. Today's fibroscan results suggest a Metavir fibrosis score of F0. There is evidence of fatty liver per the scan. The Fibroscan can be repeated annually or as often as clinically indicated to assess for fibrosis progression and/or regression. The patient was counseled regarding diet and exercise to achieve weight loss. The best diet for patients with fatty liver is one very low in carbohydrates and enriched with protein such as an Martha's program.    The patient was told not to consume any food or drinks products containing fructose as this enhances hepatic fat synthesis. Acute and chronic kidney injury   This is a common adverse event of immune suppression. The Screat is stable in the 1.5 mg range. Aspirin and NSAIDs should be avoided since these agents can worsen renal insufficiency. Hypercholesterolemia   This can be caused by immune suppression. Serum cholesterol is normal and does not need to be treated at this time. Hypertension   This is a common side effect of immune suppression. Blood pressure is well controlled on the current treatment. Low serum magnesium   This is a common side effect of immune suppression.     The patient has a normal magnesium level and does not need supplementation. Osteoporosis   Osteoporosis is common in patients with cirhrosis prior to liver transplant. The patient had osteoporosis on DEXA scan from 2013 prior to the LT. The patient still had osteoporosis on DEXA scan from 10/2015. The patient was advised to take calcium supplementation and Vitamin D. The patient is taking prolia since 11/14/2018. The most recent DEXA in 7/2020 still showed osteoporosis. Monitoring for skin Cancer  The patient was counseled regarding increased risk of skin cancer in transplant recipients and need to have any new skin lesions evaluated by dermatology and removed if suspicious. The patient was instructed to see Dermatology annually examination. Colon polyps  The patient has had colon polyps in the past.  The last colonoscopy was in 2019 with Dr Jonny Prajapati. Vaccinations  Routine vaccinations against other bacterial and viral agents can be performed as long as this is with attentuatted virus. Had two vaccinations and a booster against COVID. Live virus vaccines should not be administered. Annual flu vaccination should be administered. ALLERGIES:  Allergies   Allergen Reactions    Tape [Adhesive] Other (comments)     Pulls skin off    Percocet [Oxycodone-Acetaminophen] Shortness of Breath, Itching and Other (comments)     \"Burning skin\"    Statins-Hmg-Coa Reductase Inhibitors Other (comments)     liver     MEDICATIONS:  Current Outpatient Medications   Medication Sig    mycophenolate (CELLCEPT) 500 mg tablet Take 2 Tablets by mouth two (2) times a day. Indications: S/P Liver translplant Z 94.4    furosemide (LASIX) 40 mg tablet Take 1 Tablet by mouth daily. insulin aspart U-100 (NovoLOG Flexpen U-100 Insulin) 100 unit/mL (3 mL) inpn by SubCUTAneous route.     sirolimus (RAPAMUNE) 1 mg tablet Take 4 tabs by mouth daily    ondansetron (ZOFRAN ODT) 4 mg disintegrating tablet Take 1 Tab by mouth every three to four (3-4) hours as needed for Nausea or Vomiting. butalbital-aspirin-caffeine (FIORINAL) capsule butalbital-aspirin-caffeine 50 mg-325 mg-40 mg capsule    lisinopril (PRINIVIL, ZESTRIL) 10 mg tablet Take 10 mg by mouth daily. pantoprazole (PROTONIX) 40 mg tablet pantoprazole 40 mg tablet,delayed release    denosumab (PROLIA) 60 mg/mL injection 60 mg by SubCUTAneous route. metoprolol tartrate (LOPRESSOR) 50 mg tablet Take 25 mg by mouth two (2) times a day. No current facility-administered medications for this visit. SYSTEM REVIEW NOT RELATED TO LIVER DISEASE OR REVIEWED ABOVE:  Constitution systems: Weight gain with steroids. Eyes: Negative for visual changes. ENT: Negative for sore throat, painful swallowing. Respiratory: Negative for cough, hemoptysis, SOB. Cardiology: Negative for chest pain, palpitations. GI:  Negative for constipation or diarrhea. : Negative for urinary frequency, dysuria, hematuria, nocturia. Skin: Negative for rash. Hematology: Negative for easy bruising, blood clots. Musculo-skelatal: Negative for muscle pain or weakness. Neurologic: Negative for headaches, dizziness, vertigo, memory problems not related to HE. Psychology: Negative for anxiety, depression. FAMILY HISTORY:  There is no family history of liver disease. SOCIAL HISTORY:  The patient is . There are 2 children and 2 grandchildren. The patient has never used tobacco products. The patient has never consumed significant amounts of alcohol. The patient used to work for [employer] and then as an  for [employer].  She has not worked since the liver transplant. PHYSICAL EXAMINATION:  Visit Vitals  BP (!) 118/54   Pulse 69   Temp 98.2 °F (36.8 °C)   Ht 5' 6\" (1.676 m)   Wt 204 lb (92.5 kg)   SpO2 98%   BMI 32.93 kg/m²       General: Appears healthy. No acute distress. Eyes: Sclera anicteric. ENT: No oral lesions. Thyroid normal.  Nodes: No adenopathy.    Skin: No spider angiomata. No jaundice. No palmar erythema. Respiratory: Lungs clear to auscultation. Cardiovascular: Regular heart rate. No murmurs. No JVD. Abdomen:   Well healed liver transplant incision. Soft non-tender. Liver size normal to percussion/palpation. Spleen not palpable. No obvious ascites. Extremities: No lower edema. No muscle wasting. Neurologic:  Alert and oriented. Cranial nerves grossly intact. No asterixsis. LAB STUDIES:  [facility] [place] [facility] [address] [place] [address] Units 10/14/2022 9/23/2022   WBC 4.0 - 11.0 K/uL 4.3 8.2   ANC 1.8 - 7.7 K/uL 1.9 2.9   HGB 11.7 - 16.1 g/dL 11.7 12.7    - 440 K/uL 235 251   INR 0.89 - 1.29     AST 10 - 37 U/L 20 23   ALT 5 - 40 U/L 17 21   Alk Phos 40 - 120 U/L 56 59   Bili, Total 0.2 - 1.2 mg/dL 0.2 0.2   Bili, Direct 0.0 - 0.3 mg/dL <0.2 <0.2   Albumin 3.5 - 5.0 g/dL 3.9 3.9   BUN 6 - 22 mg/dL 19 25 (H)   Creat 0.8 - 1.4 mg/dL 1.5 (H) 1.6 (H)   Na 133 - 145 mmol/L 142 140   K 3.5 - 5.5 mmol/L 4.4 4.2   Cl 98 - 110 mmol/L 109 104   CO2 20 - 32 mmol/L 22 22   Glucose 70 - 99 mg/dL 96 61 (L)   Magnesium 1.6 - 2.5 mg/dL 2.3 2.4       SEROLOGIES:  2/2012. HAV total negative, HBsAntigen negative, anti-HBcore negative, anti-HBsurface negative, anti-HCV negative, Ferritin 20, NEVA negative, ASMA negative, AMA negative, ceruloplsmin 34, alpha-1-antitrypsin 195, A1AT phenotype MM.  2/2013. Ferritin 434, ASMA weak positive, CMV IgG positive, EBV IgG positive, anti-HIV negative, HBA1C 5.1    LIVER HISTOLOGY:  11/2017. Slides reviewed by MLS. Acute graft rejection. 6/2018. Slides reviewed by MLS. NAFL. 40% macro and micovesicular steatosis. No ballooning. No inflammation. No fibrosis. No rejection. 05/2022. FibroScan performed at [address]. EkPa was 5.9. IQR/med 22%. . The results suggested a fibrosis level of F0. The CAP score suggests there is hepatic steatosis. ENDOSCOPIC PROCEDURES:  1/2012. EGD by Dr Robert Orellana. Esophageal varices. RADIOLOGY:  1/2012. CT scan abdomen with and without IV contrast.  Changes consistent with cirrhosis. No liver mass lesions. No dilated bile ducts. No bile duct strictures. Varices. Generalized ascites. 07/2012:  Ultrasound of the liver. Echogenic consistent with chronic liver disease, cirrhosis. No liver mass lesions. No ascites  11/2012: Ultrasound of the liver. Echogenic consistent with chronic liver disease, cirrhosis. No liver mass lesions. Small amount ascites present. 1/2013. Ultrasound of liver. Echogenic consistent with cirrhosis. No liver mass lesions. No dilated bile ducts. Mild ascites. 11/2013. Ultrasound of liver. Normal appearing liver. No liver mass lesions. 12/2013:  MRI of abdomen. Questionable small focal stenosis of distal common hepatic duct. Focal stenosis in mid-portal vein. 02/2015. Ultrasound of the liver. Consistent with cirrhosis. 06/2018. Ultrasound of the liver. Mixed echogenicity, complex collection in the medial right hepatic lobe most in keeping with hematoma. 05/2019. Pelvic MRI w/wo contrast.  Enhancing endocervical canal 1.5 cm lesion. 02/2020. Abdominal Ultrasound. The liver appeared diffusely echogenic. No solid mass. OTHER TESTING:.   2/2013. ETOH negative. 8/2013:  DEXA scan - osteoporosis   10/2015. DEXA scan osteoporosis. 8/2018. TFTS. TSH 0.38/T3 free 3.4/T4 free 1.3    25 minutes total time spent with this patient with more than 50% of this time spent counseling and coordinating care as described above. FOLLOW-UP:  All of the issues listed above in the Assessment and Plan were discussed with the patient. All questions were answered. The patient expressed a clear understanding of the above. [address] in 3 months for continued routine monitoring.      DARCIE Rabago  [facility] [place] [facility]  [address] [facility], [address] [facility], [address] [place] [address]   [phone]

## 2022-11-10 LAB
A-G RATIO,AGRAT: 1.7 RATIO (ref 1.1–2.6)
ALBUMIN SERPL-MCNC: 3.9 G/DL (ref 3.5–5)
ALP SERPL-CCNC: 59 U/L (ref 40–120)
ALT SERPL-CCNC: 19 U/L (ref 5–40)
ANION GAP SERPL CALC-SCNC: 8 MMOL/L (ref 3–15)
AST SERPL W P-5'-P-CCNC: 23 U/L (ref 10–37)
BILIRUB SERPL-MCNC: 0.2 MG/DL (ref 0.2–1.2)
BILIRUBIN, DIRECT,CBIL: <0.2 MG/DL (ref 0–0.3)
BUN SERPL-MCNC: 22 MG/DL (ref 6–22)
CALCIUM SERPL-MCNC: 9.1 MG/DL (ref 8.4–10.5)
CHLORIDE SERPL-SCNC: 108 MMOL/L (ref 98–110)
CO2 SERPL-SCNC: 25 MMOL/L (ref 20–32)
CREAT SERPL-MCNC: 1.5 MG/DL (ref 0.8–1.4)
GLOBULIN,GLOB: 2.3 G/DL (ref 2–4)
GLOMERULAR FILTRATION RATE: 36.6 ML/MIN/1.73 SQ.M.
GLUCOSE SERPL-MCNC: 77 MG/DL (ref 70–99)
MAGNESIUM SERPL-MCNC: 2.2 MG/DL (ref 1.6–2.5)
POTASSIUM SERPL-SCNC: 4.6 MMOL/L (ref 3.5–5.5)
PROT SERPL-MCNC: 6.2 G/DL (ref 6.2–8.1)
RAPAMYCIN (SIROLIMUS): 3.6 NG/ML (ref 3–20)
SODIUM SERPL-SCNC: 141 MMOL/L (ref 133–145)

## 2022-11-11 ENCOUNTER — TELEPHONE (OUTPATIENT)
Dept: HEMATOLOGY | Age: 67
End: 2022-11-11

## 2022-11-11 LAB
ABSOLUTE LYMPHOCYTE COUNT, 10803: 2.3 K/UL (ref 1–4.8)
BASOPHILS # BLD: 0 K/UL (ref 0–0.2)
BASOPHILS NFR BLD: 1 % (ref 0–2)
BURR CELLS-DIF,2045: ABNORMAL
EOSINOPHIL # BLD: 0 K/UL (ref 0–0.5)
EOSINOPHIL NFR BLD: 1 % (ref 0–6)
ERYTHROCYTE [DISTWIDTH] IN BLOOD BY AUTOMATED COUNT: 12.8 % (ref 10–15.5)
GRANULOCYTES,GRANS: 38 % (ref 40–75)
HCT VFR BLD AUTO: 39 % (ref 35.1–48.3)
HGB BLD-MCNC: 12.8 G/DL (ref 11.7–16.1)
IMMATURE PLATELET FRACTION: 3 % (ref 1.1–7.1)
LYMPHOCYTES, LYMLT: 54 % (ref 20–45)
MCH RBC QN AUTO: 30 PG (ref 26–34)
MCHC RBC AUTO-ENTMCNC: 33 G/DL (ref 31–36)
MCV RBC AUTO: 90 FL (ref 80–99)
MONOCYTES # BLD: 0.3 K/UL (ref 0.1–1)
MONOCYTES NFR BLD: 7 % (ref 3–12)
NEUTROPHILS # BLD AUTO: 1.6 K/UL (ref 1.8–7.7)
NORMACHROMIC RBC, 868: ABNORMAL
NORMACYTIC RBC, 869: ABNORMAL
PLATELET # BLD AUTO: 216 K/UL (ref 140–440)
PMV BLD AUTO: 10.8 FL (ref 9–13)
RBC # BLD AUTO: 4.34 M/UL (ref 3.8–5.2)
SMEAR EVAL, 1131: ABNORMAL
TARGET CELLS, 1118: ABNORMAL
WBC # BLD AUTO: 4.2 K/UL (ref 4–11)

## 2022-11-17 RX ORDER — FUROSEMIDE 40 MG/1
TABLET ORAL
Qty: 90 TABLET | Refills: 0 | Status: SHIPPED | OUTPATIENT
Start: 2022-11-17

## 2022-12-14 LAB
A-G RATIO,AGRAT: 1.5 RATIO (ref 1.1–2.6)
ABSOLUTE LYMPHOCYTE COUNT, 10803: 2.3 K/UL (ref 1–4.8)
ALBUMIN SERPL-MCNC: 4 G/DL (ref 3.5–5)
ALP SERPL-CCNC: 73 U/L (ref 40–120)
ALT SERPL-CCNC: 21 U/L (ref 5–40)
ANION GAP SERPL CALC-SCNC: 11 MMOL/L (ref 3–15)
AST SERPL W P-5'-P-CCNC: 20 U/L (ref 10–37)
BASOPHILS # BLD: 0 K/UL (ref 0–0.2)
BASOPHILS NFR BLD: 0 % (ref 0–2)
BILIRUB SERPL-MCNC: 0.1 MG/DL (ref 0.2–1.2)
BILIRUBIN, DIRECT,CBIL: <0.2 MG/DL (ref 0–0.3)
BUN SERPL-MCNC: 21 MG/DL (ref 6–22)
CALCIUM SERPL-MCNC: 9.6 MG/DL (ref 8.4–10.5)
CHLORIDE SERPL-SCNC: 108 MMOL/L (ref 98–110)
CO2 SERPL-SCNC: 23 MMOL/L (ref 20–32)
CREAT SERPL-MCNC: 1.5 MG/DL (ref 0.8–1.4)
EOSINOPHIL # BLD: 0 K/UL (ref 0–0.5)
EOSINOPHIL NFR BLD: 1 % (ref 0–6)
ERYTHROCYTE [DISTWIDTH] IN BLOOD BY AUTOMATED COUNT: 12.4 % (ref 10–15.5)
GLOBULIN,GLOB: 2.6 G/DL (ref 2–4)
GLOMERULAR FILTRATION RATE: 36.6 ML/MIN/1.73 SQ.M.
GLUCOSE SERPL-MCNC: 91 MG/DL (ref 70–99)
GRANULOCYTES,GRANS: 44 % (ref 40–75)
HCT VFR BLD AUTO: 39.6 % (ref 35.1–48.3)
HGB BLD-MCNC: 12.4 G/DL (ref 11.7–16.1)
LYMPHOCYTES, LYMLT: 48 % (ref 20–45)
MAGNESIUM SERPL-MCNC: 2.2 MG/DL (ref 1.6–2.5)
MCH RBC QN AUTO: 29 PG (ref 26–34)
MCHC RBC AUTO-ENTMCNC: 31 G/DL (ref 31–36)
MCV RBC AUTO: 93 FL (ref 80–99)
MONOCYTES # BLD: 0.3 K/UL (ref 0.1–1)
MONOCYTES NFR BLD: 7 % (ref 3–12)
NEUTROPHILS # BLD AUTO: 2.1 K/UL (ref 1.8–7.7)
PLATELET # BLD AUTO: 219 K/UL (ref 140–440)
PMV BLD AUTO: 10.1 FL (ref 9–13)
POTASSIUM SERPL-SCNC: 5.1 MMOL/L (ref 3.5–5.5)
PROT SERPL-MCNC: 6.6 G/DL (ref 6.2–8.1)
RBC # BLD AUTO: 4.28 M/UL (ref 3.8–5.2)
SODIUM SERPL-SCNC: 142 MMOL/L (ref 133–145)
WBC # BLD AUTO: 4.8 K/UL (ref 4–11)

## 2022-12-15 LAB — RAPAMYCIN (SIROLIMUS): 2.8 NG/ML (ref 3–20)

## 2023-01-20 LAB
A-G RATIO,AGRAT: 1.3 RATIO (ref 1.1–2.6)
ALBUMIN SERPL-MCNC: 3.7 G/DL (ref 3.5–5)
ALP SERPL-CCNC: 66 U/L (ref 40–120)
ALT SERPL-CCNC: 14 U/L (ref 5–40)
ANION GAP SERPL CALC-SCNC: 13 MMOL/L (ref 3–15)
AST SERPL W P-5'-P-CCNC: 20 U/L (ref 10–37)
BILIRUB SERPL-MCNC: 0.2 MG/DL (ref 0.2–1.2)
BILIRUBIN, DIRECT,CBIL: <0.2 MG/DL (ref 0–0.3)
BUN SERPL-MCNC: 19 MG/DL (ref 6–22)
CALCIUM SERPL-MCNC: 8.8 MG/DL (ref 8.4–10.5)
CHLORIDE SERPL-SCNC: 108 MMOL/L (ref 98–110)
CO2 SERPL-SCNC: 18 MMOL/L (ref 20–32)
CREAT SERPL-MCNC: 1.5 MG/DL (ref 0.8–1.4)
GLOBULIN,GLOB: 2.8 G/DL (ref 2–4)
GLOMERULAR FILTRATION RATE: 36.6 ML/MIN/1.73 SQ.M.
GLUCOSE SERPL-MCNC: 90 MG/DL (ref 70–99)
MAGNESIUM SERPL-MCNC: 2.2 MG/DL (ref 1.6–2.5)
POTASSIUM SERPL-SCNC: 4.8 MMOL/L (ref 3.5–5.5)
PROT SERPL-MCNC: 6.5 G/DL (ref 6.2–8.1)
RAPAMYCIN (SIROLIMUS): 3.5 NG/ML (ref 3–20)
SODIUM SERPL-SCNC: 139 MMOL/L (ref 133–145)

## 2023-01-23 LAB
ABSOLUTE LYMPHOCYTE COUNT, 10803: 2.9 K/UL (ref 1–4.8)
BASOPHILS # BLD: 0 K/UL (ref 0–0.2)
BASOPHILS NFR BLD: 1 % (ref 0–2)
EOSINOPHIL # BLD: 0.1 K/UL (ref 0–0.5)
EOSINOPHIL NFR BLD: 1 % (ref 0–6)
ERYTHROCYTE [DISTWIDTH] IN BLOOD BY AUTOMATED COUNT: 12.7 % (ref 10–15.5)
GRANULOCYTES,GRANS: 42 % (ref 40–75)
HCT VFR BLD AUTO: 43.4 % (ref 35.1–48.3)
HGB BLD-MCNC: 13.6 G/DL (ref 11.7–16.1)
LYMPHOCYTES, LYMLT: 50 % (ref 20–45)
MCH RBC QN AUTO: 28 PG (ref 26–34)
MCHC RBC AUTO-ENTMCNC: 31 G/DL (ref 31–36)
MCV RBC AUTO: 90 FL (ref 80–99)
MONOCYTES # BLD: 0.4 K/UL (ref 0.1–1)
MONOCYTES NFR BLD: 6 % (ref 3–12)
NEUTROPHILS # BLD AUTO: 2.4 K/UL (ref 1.8–7.7)
PLATELET # BLD AUTO: 263 K/UL (ref 140–440)
PMV BLD AUTO: 10.1 FL (ref 9–13)
RBC # BLD AUTO: 4.82 M/UL (ref 3.8–5.2)
WBC # BLD AUTO: 5.8 K/UL (ref 4–11)

## 2023-01-24 DIAGNOSIS — Z79.899 LONG TERM CURRENT USE OF IMMUNOSUPPRESSIVE DRUG: ICD-10-CM

## 2023-01-24 DIAGNOSIS — Z94.4 H/O LIVER TRANSPLANT (HCC): Primary | ICD-10-CM

## 2023-01-26 ENCOUNTER — OFFICE VISIT (OUTPATIENT)
Dept: HEMATOLOGY | Age: 68
End: 2023-01-26
Payer: MEDICARE

## 2023-01-26 VITALS
HEART RATE: 86 BPM | WEIGHT: 202 LBS | BODY MASS INDEX: 32.47 KG/M2 | TEMPERATURE: 98.2 F | DIASTOLIC BLOOD PRESSURE: 51 MMHG | SYSTOLIC BLOOD PRESSURE: 132 MMHG | OXYGEN SATURATION: 98 % | HEIGHT: 66 IN

## 2023-01-26 DIAGNOSIS — Z94.4 S/P LIVER TRANSPLANT (HCC): Primary | ICD-10-CM

## 2023-01-26 PROCEDURE — G8427 DOCREV CUR MEDS BY ELIG CLIN: HCPCS | Performed by: NURSE PRACTITIONER

## 2023-01-26 PROCEDURE — G8400 PT W/DXA NO RESULTS DOC: HCPCS | Performed by: NURSE PRACTITIONER

## 2023-01-26 PROCEDURE — 99214 OFFICE O/P EST MOD 30 MIN: CPT | Performed by: NURSE PRACTITIONER

## 2023-01-26 PROCEDURE — 1090F PRES/ABSN URINE INCON ASSESS: CPT | Performed by: NURSE PRACTITIONER

## 2023-01-26 PROCEDURE — G8536 NO DOC ELDER MAL SCRN: HCPCS | Performed by: NURSE PRACTITIONER

## 2023-01-26 PROCEDURE — G8432 DEP SCR NOT DOC, RNG: HCPCS | Performed by: NURSE PRACTITIONER

## 2023-01-26 PROCEDURE — G8417 CALC BMI ABV UP PARAM F/U: HCPCS | Performed by: NURSE PRACTITIONER

## 2023-01-26 PROCEDURE — G0463 HOSPITAL OUTPT CLINIC VISIT: HCPCS | Performed by: NURSE PRACTITIONER

## 2023-01-26 PROCEDURE — 1101F PT FALLS ASSESS-DOCD LE1/YR: CPT | Performed by: NURSE PRACTITIONER

## 2023-01-26 PROCEDURE — 3017F COLORECTAL CA SCREEN DOC REV: CPT | Performed by: NURSE PRACTITIONER

## 2023-01-26 PROCEDURE — 1123F ACP DISCUSS/DSCN MKR DOCD: CPT | Performed by: NURSE PRACTITIONER

## 2023-01-26 RX ORDER — TIZANIDINE 4 MG/1
TABLET ORAL
COMMUNITY
Start: 2022-12-20

## 2023-01-26 RX ORDER — ZOLEDRONIC ACID 5 MG/100ML
5 INJECTION, SOLUTION INTRAVENOUS ONCE
COMMUNITY

## 2023-01-26 RX ORDER — SEMAGLUTIDE 1.34 MG/ML
INJECTION, SOLUTION SUBCUTANEOUS
COMMUNITY
Start: 2022-11-18

## 2023-01-26 NOTE — PROGRESS NOTES
0240 [facility], MD, [address], [facility], [place]      ZAKI Liao, [facility]-BC     Marilyn Yung, [facility]   LUCHO Jain-ROZINA Reddy, [facility]       Marybel Deputado Reece De Harvey 136    at [address], [address] [place] [address], [facility] 22.    [phone]    FAX: [address] [place] [address] Avenue    at [address], [address], [address]    [phone]    FAX: [phone]       Patient Care Team:  Zee Saenz MD as PCP - General (Family Medicine)  Yg Machado MD as Physician (Surgery Physician)  Kadeem Gore MD as Physician (Obstetrics & Gynecology)  Jamal Reina MD as Physician (Neurosurgery)  Gillian Shipman DDS as Physician (Dental General Practice)  Lion Christine O.D. as Physician (Optometry)  Isis Guerrero MD (Gastroenterology)  Wilber Dorsey MD (Internal Medicine Physician)  Sari Coto MD (Hematology and Oncology)  Cinthya Pascal DO (Rheumatology Internal Medicine)  Cristiano Hardin MD (Dermatology Physician)  Sosa Scruggs MD (Endocrinology Physician)  Eliz Tijerina DPM (Podiatry)  Manjula Rodriguez, RN as Nurse Navigator (Hepatology)        Problem List  Date Reviewed: 10/24/2022            Codes Class Noted    Long term current use of immunosuppressive drug ICD-10-CM: Z79.899  ICD-9-CM: V58.69  4/18/2018        H/O liver transplant [facility]) ICD-10-CM: Z94.4  ICD-9-CM: V42.7  09/78/4446        Complication of transplanted liver [facility]) ICD-10-CM: T86.40  ICD-9-CM: 996.82  11/13/2013        Back pain, lumbosacral ICD-10-CM: M54.50  ICD-9-CM: 724.2, 724.6  1/27/2013        Diabetes mellitus ([facility] ICD-10-CM: E11.9  ICD-9-CM: 250.00  5/28/2012        Colon polyps ICD-10-CM: K36.5  ICD-9-CM: 211.3 5/28/2012        Breast cancer ([facility] ICD-10-CM: C50.919  ICD-9-CM: 174.9  5/28/2012    Overview Signed 5/28/2012  7:00 AM by Marla Monge MD     Masectomy, chemotherapy,XRT. 1996  Tamoxifen [id number]               H/O shoulder surgery ICD-10-CM: Z98.890  ICD-9-CM: V45.89  5/28/2012    Overview Signed 5/28/2012  7:07 AM by Marla Monge MD     12/2011                Nat Swanson returns to the [facility] for management of liver allograft function, to adjust immune suppression. The active problem list, all pertinent past medical history, medications, liver histology, endoscopic studies, radiologic findings and laboratory findings related to the liver disorder were reviewed with the patient. The patient underwent liver transplantation at [place], [place] in 4/2013. The patient is currently receiving sirolimus, cellcept for immune suppression. The patient had an acute graft rejection in 10/2017. The patient has no symptoms which can be attributed to the liver disorder. The patient has not experienced fatigue, fevers, chills. \"I've always had back issues\". Had an \"episode\" in 11/2022. Had unremarkable MRI in 12/2022. Had epidural steroid injection in 01/2023. The patient completes all daily activities without any functional limitations. All of the issues listed in the Assessment and Plan were discussed with the patient. All questions were answered. Since the last office appointment:  Seeing Dr. Jahaira Camp of endocrinology. Now using \"omnipod\" to regulate blood sugars. Started Ozempic. Had Reclast infusion in 12/2022. Had labs. Had an epidural steroid injection in her back on 01/07/2023. 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   The date of the liver transplant was 4/2013    The most recent laboratory studies indicate that the liver transaminases are normal, alkaline phosphatase is normal, tests of hepatic synthetic and metabolic function are normal, and the platelet count is normal.      A liver biopsy from 11/2017 demonstrated acute rejection. Rejection was treated with high dose steroids and taper. A liver biopsy in 6/2018 demonstrated fatty liver with no rejection. Immune Suppression  The patient is receiving immune suppression with sirolimus which is being well tolerated at 1 mg/day. The immune suppression blood level is in the proper range. Sirolimus was 3.7 ng/ml on 10/14/2022. \We will continue the Cellcept at 250 mg bid and 1 mg sirolimus daily for now. NAFL  The diagnosis was made by liver biopsy in 6/2018. The need to perform an assessment of liver fibrosis was discussed with the patient. The Fibroscan can assess liver fibrosis and determine if a patient has advanced fibrosis or cirrhosis without the need for liver biopsy. The fibroscan was repeated today. Today's fibroscan results suggest a Metavir fibrosis score of F0. There is evidence of fatty liver per the scan. The Fibroscan can be repeated annually or as often as clinically indicated to assess for fibrosis progression and/or regression. The patient was counseled regarding diet and exercise to achieve weight loss. The best diet for patients with fatty liver is one very low in carbohydrates and enriched with protein such as an Martha's program.    The patient was told not to consume any food or drinks products containing fructose as this enhances hepatic fat synthesis. Acute and chronic kidney injury   This is a common adverse event of immune suppression. The Screat is stable in the 1.5 mg range. Aspirin and NSAIDs should be avoided since these agents can worsen renal insufficiency. Hypercholesterolemia   This can be caused by immune suppression. Serum cholesterol is normal and does not need to be treated at this time.     Hypertension   This is a common side effect of immune suppression. Blood pressure is well controlled on the current treatment. Low serum magnesium   This is a common side effect of immune suppression. The patient has a normal magnesium level and does not need supplementation. Osteoporosis   Osteoporosis is common in patients with cirhrosis prior to liver transplant. The patient had osteoporosis on DEXA scan from 2013 prior to the LT. The patient still had osteoporosis on DEXA scan from 10/2015. The patient was advised to take calcium supplementation and Vitamin D. The patient is taking prolia since 11/14/2018. The most recent DEXA in 7/2020 still showed osteoporosis. Monitoring for skin Cancer  The patient was counseled regarding increased risk of skin cancer in transplant recipients and need to have any new skin lesions evaluated by dermatology and removed if suspicious. The patient was instructed to see Dermatology annually examination. Colon polyps  The patient has had colon polyps in the past.  The last colonoscopy was in 12/2020 with Dr Kenia Klein. Vaccinations  Routine vaccinations against other bacterial and viral agents can be performed as long as this is with attentuatted virus. Had two vaccinations and a booster against COVID. Live virus vaccines should not be administered. Annual flu vaccination should be administered. ALLERGIES:  Allergies   Allergen Reactions    Tape [Adhesive] Other (comments)     Pulls skin off    Percocet [Oxycodone-Acetaminophen] Shortness of Breath, Itching and Other (comments)     \"Burning skin\"    Statins-Hmg-Coa Reductase Inhibitors Other (comments)     liver     MEDICATIONS:  Current Outpatient Medications   Medication Sig    furosemide (LASIX) 40 mg tablet Take 1 tablet by mouth once daily    mycophenolate (CELLCEPT) 500 mg tablet Take 2 Tablets by mouth two (2) times a day.  Indications: S/P Liver translplant Z 94.4    insulin aspart U-100 (NovoLOG Flexpen U-100 Insulin) 100 unit/mL (3 mL) inpn by SubCUTAneous route. sirolimus (RAPAMUNE) 1 mg tablet Take 4 tabs by mouth daily    ondansetron (ZOFRAN ODT) 4 mg disintegrating tablet Take 1 Tab by mouth every three to four (3-4) hours as needed for Nausea or Vomiting. butalbital-aspirin-caffeine (FIORINAL) capsule butalbital-aspirin-caffeine 50 mg-325 mg-40 mg capsule    lisinopril (PRINIVIL, ZESTRIL) 10 mg tablet Take 10 mg by mouth daily. pantoprazole (PROTONIX) 40 mg tablet pantoprazole 40 mg tablet,delayed release    denosumab (PROLIA) 60 mg/mL injection 60 mg by SubCUTAneous route. metoprolol tartrate (LOPRESSOR) 50 mg tablet Take 25 mg by mouth two (2) times a day. No current facility-administered medications for this visit. SYSTEM REVIEW NOT RELATED TO LIVER DISEASE OR REVIEWED ABOVE:  Constitution systems: Weight gain with steroids. Eyes: Negative for visual changes. ENT: Negative for sore throat, painful swallowing. Respiratory: Negative for cough, hemoptysis, SOB. Cardiology: Negative for chest pain, palpitations. GI:  Negative for constipation or diarrhea. : Negative for urinary frequency, dysuria, hematuria, nocturia. Skin: Negative for rash. Hematology: Negative for easy bruising, blood clots. Musculo-skelatal: Negative for muscle pain or weakness. Neurologic: Negative for headaches, dizziness, vertigo, memory problems not related to HE. Psychology: Negative for anxiety, depression. FAMILY HISTORY:  There is no family history of liver disease. SOCIAL HISTORY:  The patient is . There are 2 children and 2 grandchildren. The patient has never used tobacco products. The patient has never consumed significant amounts of alcohol. The patient used to work for [employer] and then as an  for [employer].  She has not worked since the liver transplant.     PHYSICAL EXAMINATION:  Visit Vitals  BP (!) 132/51 Pulse 86   Temp 98.2 °F (36.8 °C)   Ht 5' 6\" (1.676 m)   Wt 202 lb (91.6 kg)   SpO2 98%   BMI 32.60 kg/m²       General: Appears healthy. No acute distress. Eyes: Sclera anicteric. ENT: No oral lesions. Thyroid normal.  Nodes: No adenopathy. Skin: No spider angiomata. No jaundice. No palmar erythema. Respiratory: Lungs clear to auscultation. Cardiovascular: Regular heart rate. No murmurs. No JVD. Abdomen:   Well healed liver transplant incision. Soft non-tender. Liver size normal to percussion/palpation. Spleen not palpable. No obvious ascites. Extremities: No lower edema. No muscle wasting. Neurologic:  Alert and oriented. Cranial nerves grossly intact. No asterixsis. LAB STUDIES:  Liver [place] of [address] Units 10/14/2022 9/23/2022   WBC 4.0 - 11.0 K/uL 4.3 8.2   ANC 1.8 - 7.7 K/uL 1.9 2.9   HGB 11.7 - 16.1 g/dL 11.7 12.7    - 440 K/uL 235 251   INR 0.89 - 1.29     AST 10 - 37 U/L 20 23   ALT 5 - 40 U/L 17 21   Alk Phos 40 - 120 U/L 56 59   Bili, Total 0.2 - 1.2 mg/dL 0.2 0.2   Bili, Direct 0.0 - 0.3 mg/dL <0.2 <0.2   Albumin 3.5 - 5.0 g/dL 3.9 3.9   BUN 6 - 22 mg/dL 19 25 (H)   Creat 0.8 - 1.4 mg/dL 1.5 (H) 1.6 (H)   Na 133 - 145 mmol/L 142 140   K 3.5 - 5.5 mmol/L 4.4 4.2   Cl 98 - 110 mmol/L 109 104   CO2 20 - 32 mmol/L 22 22   Glucose 70 - 99 mg/dL 96 61 (L)   Magnesium 1.6 - 2.5 mg/dL 2.3 2.4       SEROLOGIES:  2/2012. HAV total negative, HBsAntigen negative, anti-HBcore negative, anti-HBsurface negative, anti-HCV negative, Ferritin 20, NEVA negative, ASMA negative, AMA negative, ceruloplsmin 34, alpha-1-antitrypsin 195, A1AT phenotype MM.  2/2013. Ferritin 434, ASMA weak positive, CMV IgG positive, EBV IgG positive, anti-HIV negative, HBA1C 5.1    LIVER HISTOLOGY:  11/2017. Slides reviewed by MLS. Acute graft rejection. 6/2018. Slides reviewed by MLS. NAFL. 40% macro and micovesicular steatosis. No ballooning. No inflammation.   No fibrosis. No rejection. 05/2022. [facility]. EkPa was 5.9. IQR/med 22%. . The results suggested a fibrosis level of F0. The CAP score suggests there is hepatic steatosis. ENDOSCOPIC PROCEDURES:  1/2012. EGD by Dr Gladis Luna. Esophageal varices. RADIOLOGY:  1/2012. CT scan abdomen with and without IV contrast.  Changes consistent with cirrhosis. No liver mass lesions. No dilated bile ducts. No bile duct strictures. Varices. Generalized ascites. 07/2012:  Ultrasound of the liver. Echogenic consistent with chronic liver disease, cirrhosis. No liver mass lesions. No ascites  11/2012: Ultrasound of the liver. Echogenic consistent with chronic liver disease, cirrhosis. No liver mass lesions. Small amount ascites present. 1/2013. Ultrasound of liver. Echogenic consistent with cirrhosis. No liver mass lesions. No dilated bile ducts. Mild ascites. 11/2013. Ultrasound of liver. Normal appearing liver. No liver mass lesions. 12/2013:  MRI of abdomen. Questionable small focal stenosis of distal common hepatic duct. Focal stenosis in mid-portal vein. 02/2015. Ultrasound of the liver. Consistent with cirrhosis. 06/2018. Ultrasound of the liver. Mixed echogenicity, complex collection in the medial right hepatic lobe most in keeping with hematoma. 05/2019. Pelvic MRI w/wo contrast.  Enhancing endocervical canal 1.5 cm lesion. 02/2020. Abdominal Ultrasound. The liver appeared diffusely echogenic. No solid mass. OTHER TESTING:.   2/2013. ETOH negative. 8/2013:  DEXA scan - osteoporosis   10/2015. DEXA scan osteoporosis. 8/2018. TFTS. TSH 0.38/T3 free 3.4/T4 free 1.3    25 minutes total time spent with this patient with more than 50% of this time spent counseling and coordinating care as described above. FOLLOW-UP:  All of the issues listed above in the Assessment and Plan were discussed with the patient.   All questions were answered. The patient expressed a clear understanding of the above. [address] in 3 months for continued routine monitoring.      CELIA ChristiansonP-C  Liver [place] of [address], [address] [place] [address] Marta Shepard, [address] [place] [address]   [phone]

## 2023-02-15 RX ORDER — FUROSEMIDE 40 MG/1
TABLET ORAL
Qty: 90 TABLET | Refills: 0 | Status: SHIPPED | OUTPATIENT
Start: 2023-02-15

## 2023-02-21 LAB
A/G RATIO: 1.4 RATIO (ref 1.1–2.6)
ALBUMIN SERPL-MCNC: 3.6 G/DL (ref 3.5–5)
ALP BLD-CCNC: 67 U/L (ref 40–120)
ALT SERPL-CCNC: 20 U/L (ref 5–40)
ANION GAP SERPL CALCULATED.3IONS-SCNC: 11 MMOL/L (ref 3–15)
AST SERPL-CCNC: 22 U/L (ref 10–37)
BASOPHILS # BLD: 0 % (ref 0–2)
BASOPHILS ABSOLUTE: 0 K/UL (ref 0–0.2)
BILIRUB SERPL-MCNC: 0.1 MG/DL (ref 0.2–1.2)
BILIRUBIN DIRECT: <0.2 MG/DL (ref 0–0.3)
BUN BLDV-MCNC: 17 MG/DL (ref 6–22)
CALCIUM SERPL-MCNC: 9.6 MG/DL (ref 8.4–10.5)
CHLORIDE BLD-SCNC: 105 MMOL/L (ref 98–110)
CO2: 24 MMOL/L (ref 20–32)
CREAT SERPL-MCNC: 1.4 MG/DL (ref 0.8–1.4)
EOSINOPHIL # BLD: 1 % (ref 0–6)
EOSINOPHILS ABSOLUTE: 0.1 K/UL (ref 0–0.5)
GLOBULIN: 2.6 G/DL (ref 2–4)
GLOMERULAR FILTRATION RATE: 39.7 ML/MIN/1.73 SQ.M.
GLUCOSE: 89 MG/DL (ref 70–99)
HCT VFR BLD CALC: 40.5 % (ref 35.1–48.3)
HEMOGLOBIN: 13.3 G/DL (ref 11.7–16.1)
LYMPHOCYTES # BLD: 52 % (ref 20–45)
LYMPHOCYTES ABSOLUTE: 2.6 K/UL (ref 1–4.8)
MAGNESIUM: 2.3 MG/DL (ref 1.6–2.5)
MCH RBC QN AUTO: 29 PG (ref 26–34)
MCHC RBC AUTO-ENTMCNC: 33 G/DL (ref 31–36)
MCV RBC AUTO: 89 FL (ref 80–99)
MONOCYTES ABSOLUTE: 0.3 K/UL (ref 0.1–1)
MONOCYTES: 7 % (ref 3–12)
NEUTROPHILS ABSOLUTE: 1.9 K/UL (ref 1.8–7.7)
NEUTROPHILS: 39 % (ref 40–75)
PDW BLD-RTO: 12.7 % (ref 10–15.5)
PLATELET # BLD: 232 K/UL (ref 140–440)
PMV BLD AUTO: 9.9 FL (ref 9–13)
POTASSIUM SERPL-SCNC: 5 MMOL/L (ref 3.5–5.5)
RAPAMUNE (SIROLIMUS): 3 NG/ML (ref 3–20)
RBC: 4.53 M/UL (ref 3.8–5.2)
SODIUM BLD-SCNC: 140 MMOL/L (ref 133–145)
TOTAL PROTEIN: 6.2 G/DL (ref 6.2–8.1)
WBC: 4.9 K/UL (ref 4–11)

## 2023-03-20 LAB
A/G RATIO: 1.5 RATIO (ref 1.1–2.6)
ALBUMIN SERPL-MCNC: 4 G/DL (ref 3.5–5)
ALP BLD-CCNC: 69 U/L (ref 40–120)
ALT SERPL-CCNC: 19 U/L (ref 5–40)
ANION GAP SERPL CALCULATED.3IONS-SCNC: 15 MMOL/L (ref 3–15)
AST SERPL-CCNC: 21 U/L (ref 10–37)
BASOPHILS # BLD: 0 % (ref 0–2)
BASOPHILS ABSOLUTE: 0 K/UL (ref 0–0.2)
BILIRUB SERPL-MCNC: 0.2 MG/DL (ref 0.2–1.2)
BILIRUBIN DIRECT: <0.2 MG/DL (ref 0–0.3)
BUN BLDV-MCNC: 26 MG/DL (ref 6–22)
CALCIUM SERPL-MCNC: 9.6 MG/DL (ref 8.4–10.5)
CHLORIDE BLD-SCNC: 100 MMOL/L (ref 98–110)
CO2: 23 MMOL/L (ref 20–32)
CREAT SERPL-MCNC: 1.7 MG/DL (ref 0.8–1.4)
EOSINOPHIL # BLD: 1 % (ref 0–6)
EOSINOPHILS ABSOLUTE: 0 K/UL (ref 0–0.5)
GLOBULIN: 2.7 G/DL (ref 2–4)
GLOMERULAR FILTRATION RATE: 31.8 ML/MIN/1.73 SQ.M.
GLUCOSE: 63 MG/DL (ref 70–99)
HCT VFR BLD CALC: 40.6 % (ref 35.1–48.3)
HEMOGLOBIN: 12.9 G/DL (ref 11.7–16.1)
LYMPHOCYTES # BLD: 52 % (ref 20–45)
LYMPHOCYTES ABSOLUTE: 3.2 K/UL (ref 1–4.8)
MAGNESIUM: 2.1 MG/DL (ref 1.6–2.5)
MCH RBC QN AUTO: 28 PG (ref 26–34)
MCHC RBC AUTO-ENTMCNC: 32 G/DL (ref 31–36)
MCV RBC AUTO: 89 FL (ref 80–99)
MONOCYTES ABSOLUTE: 0.5 K/UL (ref 0.1–1)
MONOCYTES: 8 % (ref 3–12)
NEUTROPHILS ABSOLUTE: 2.4 K/UL (ref 1.8–7.7)
NEUTROPHILS: 39 % (ref 40–75)
PDW BLD-RTO: 12.2 % (ref 10–15.5)
PLATELET # BLD: 248 K/UL (ref 140–440)
PMV BLD AUTO: 10 FL (ref 9–13)
POTASSIUM SERPL-SCNC: 3.6 MMOL/L (ref 3.5–5.5)
RAPAMUNE (SIROLIMUS): 3.1 NG/ML (ref 3–20)
RBC: 4.57 M/UL (ref 3.8–5.2)
SODIUM BLD-SCNC: 138 MMOL/L (ref 133–145)
TOTAL PROTEIN: 6.7 G/DL (ref 6.2–8.1)
WBC: 6.1 K/UL (ref 4–11)

## 2023-03-20 NOTE — PROGRESS NOTES
Patient notified of need for repeat liver biopsy. Will have labs drawn prior to procedure. Statement Selected

## 2023-04-21 LAB
A/G RATIO: 1.6 RATIO (ref 1.1–2.6)
ALBUMIN SERPL-MCNC: 3.9 G/DL (ref 3.5–5)
ALP BLD-CCNC: 67 U/L (ref 40–120)
ALT SERPL-CCNC: 18 U/L (ref 5–40)
ANION GAP SERPL CALCULATED.3IONS-SCNC: 11 MMOL/L (ref 3–15)
AST SERPL-CCNC: 17 U/L (ref 10–37)
BASOPHILS # BLD: 0 % (ref 0–2)
BASOPHILS ABSOLUTE: 0 K/UL (ref 0–0.2)
BILIRUB SERPL-MCNC: 0.2 MG/DL (ref 0.2–1.2)
BILIRUBIN DIRECT: <0.2 MG/DL (ref 0–0.3)
BUN BLDV-MCNC: 27 MG/DL (ref 6–22)
CALCIUM SERPL-MCNC: 9.3 MG/DL (ref 8.4–10.5)
CHLORIDE BLD-SCNC: 101 MMOL/L (ref 98–110)
CO2: 25 MMOL/L (ref 20–32)
CREAT SERPL-MCNC: 1.9 MG/DL (ref 0.8–1.4)
EOSINOPHIL # BLD: 1 % (ref 0–6)
EOSINOPHILS ABSOLUTE: 0.1 K/UL (ref 0–0.5)
GLOBULIN: 2.5 G/DL (ref 2–4)
GLOMERULAR FILTRATION RATE: 27.9 ML/MIN/1.73 SQ.M.
GLUCOSE: 64 MG/DL (ref 70–99)
HCT VFR BLD CALC: 36.4 % (ref 35.1–48.3)
HEMOGLOBIN: 11.9 G/DL (ref 11.7–16.1)
LYMPHOCYTES # BLD: 59 % (ref 20–45)
LYMPHOCYTES ABSOLUTE: 4.7 K/UL (ref 1–4.8)
MAGNESIUM: 2.5 MG/DL (ref 1.6–2.5)
MCH RBC QN AUTO: 30 PG (ref 26–34)
MCHC RBC AUTO-ENTMCNC: 33 G/DL (ref 31–36)
MCV RBC AUTO: 91 FL (ref 80–99)
MONOCYTES ABSOLUTE: 0.6 K/UL (ref 0.1–1)
MONOCYTES: 8 % (ref 3–12)
NEUTROPHILS ABSOLUTE: 2.6 K/UL (ref 1.8–7.7)
NEUTROPHILS: 32 % (ref 40–75)
PDW BLD-RTO: 12.6 % (ref 10–15.5)
PLATELET # BLD: 327 K/UL (ref 140–440)
PMV BLD AUTO: 10.4 FL (ref 9–13)
POTASSIUM SERPL-SCNC: 4.5 MMOL/L (ref 3.5–5.5)
RAPAMUNE (SIROLIMUS): 3.1 NG/ML (ref 3–20)
RBC: 3.99 M/UL (ref 3.8–5.2)
SODIUM BLD-SCNC: 137 MMOL/L (ref 133–145)
TOTAL PROTEIN: 6.4 G/DL (ref 6.2–8.1)
WBC: 8 K/UL (ref 4–11)

## 2023-04-27 ENCOUNTER — OFFICE VISIT (OUTPATIENT)
Age: 68
End: 2023-04-27
Payer: MEDICARE

## 2023-04-27 VITALS
HEART RATE: 111 BPM | DIASTOLIC BLOOD PRESSURE: 45 MMHG | TEMPERATURE: 98.4 F | HEIGHT: 66 IN | SYSTOLIC BLOOD PRESSURE: 119 MMHG | BODY MASS INDEX: 32.6 KG/M2 | OXYGEN SATURATION: 98 %

## 2023-04-27 DIAGNOSIS — Z94.4 LIVER TRANSPLANT STATUS (HCC): Primary | ICD-10-CM

## 2023-04-27 PROCEDURE — 3017F COLORECTAL CA SCREEN DOC REV: CPT | Performed by: NURSE PRACTITIONER

## 2023-04-27 PROCEDURE — G8400 PT W/DXA NO RESULTS DOC: HCPCS | Performed by: NURSE PRACTITIONER

## 2023-04-27 PROCEDURE — 1090F PRES/ABSN URINE INCON ASSESS: CPT | Performed by: NURSE PRACTITIONER

## 2023-04-27 PROCEDURE — G8417 CALC BMI ABV UP PARAM F/U: HCPCS | Performed by: NURSE PRACTITIONER

## 2023-04-27 PROCEDURE — 99215 OFFICE O/P EST HI 40 MIN: CPT | Performed by: NURSE PRACTITIONER

## 2023-04-27 PROCEDURE — G8428 CUR MEDS NOT DOCUMENT: HCPCS | Performed by: NURSE PRACTITIONER

## 2023-04-27 PROCEDURE — 4004F PT TOBACCO SCREEN RCVD TLK: CPT | Performed by: NURSE PRACTITIONER

## 2023-04-27 PROCEDURE — 1123F ACP DISCUSS/DSCN MKR DOCD: CPT | Performed by: NURSE PRACTITIONER

## 2023-04-27 RX ORDER — SIROLIMUS 1 MG/1
TABLET, FILM COATED ORAL
Qty: 360 TABLET | Refills: 3 | Status: SHIPPED | OUTPATIENT
Start: 2023-04-27

## 2023-04-27 RX ORDER — SIROLIMUS 1 MG/1
TABLET, FILM COATED ORAL
Qty: 120 TABLET | Refills: 3 | Status: SHIPPED | OUTPATIENT
Start: 2023-04-27 | End: 2023-04-27

## 2023-04-27 RX ORDER — MYCOPHENOLATE MOFETIL 500 MG/1
500 TABLET ORAL 2 TIMES DAILY
Qty: 360 TABLET | Refills: 3 | Status: SHIPPED | OUTPATIENT
Start: 2023-04-27 | End: 2023-07-26

## 2023-04-27 RX ORDER — ZOLEDRONIC ACID 5 MG/100ML
5 INJECTION, SOLUTION INTRAVENOUS ONCE
COMMUNITY

## 2023-04-27 RX ORDER — TIZANIDINE 4 MG/1
TABLET ORAL
COMMUNITY
Start: 2022-11-22

## 2023-04-27 RX ORDER — MYCOPHENOLATE MOFETIL 500 MG/1
1000 TABLET ORAL 2 TIMES DAILY
Qty: 360 TABLET | Refills: 3 | Status: SHIPPED | OUTPATIENT
Start: 2023-04-27 | End: 2023-04-27

## 2023-04-27 RX ORDER — SEMAGLUTIDE 1.34 MG/ML
INJECTION, SOLUTION SUBCUTANEOUS
COMMUNITY
Start: 2022-11-18

## 2023-04-27 NOTE — PROGRESS NOTES
Lungs clear to auscultation. Cardiovascular: Regular heart rate. No murmurs. No JVD. Abdomen:   Well healed liver transplant incision. Soft non-tender. Liver size normal to percussion/palpation. Spleen not palpable. No obvious ascites. Extremities: No lower edema. No muscle wasting. Neurologic:  Alert and oriented. Cranial nerves grossly intact. No asterixsis. LAB STUDIES:  [address] & Units 4/21/2023 3/20/2023   WBC 4.0 - 11.0 K/uL 8.0 6.1   ANC 1.8 - 7.7 K/uL 2.6 2.4   HGB 11.7 - 16.1 g/dL 11.9 12.9    - 440 K/uL 327 248   INR 0.89 - 1.29 NA     AST 10 - 37 U/L 17 21   ALT 5 - 40 U/L 18 19   Alk Phos 40 - 120 U/L 67 69   Bili, Total 0.2 - 1.2 mg/dL 0.2 0.2   Bili, Direct 0.0 - 0.3 mg/dL <0.2 <0.2   Albumin 3.5 - 5.0 g/dL 3.9 4.0   BUN 6 - 22 mg/dL 27 (H) 26 (H)   Creat 0.8 - 1.4 mg/dL 1.9 (H) 1.7 (H)   Na 133 - 145 mmol/L 137 138   K 3.5 - 5.5 mmol/L 4.5 3.6   Cl 98 - 110 mmol/L 101 100   CO2 20 - 32 mmol/L 25 23   Glucose 70 - 99 mg/dL 64 (L) 63 (L)   Magnesium 1.6 - 2.5 mg/dL 2.5 2.1     Liver [facility] Latest Ref Rng & Units 2/20/2023   WBC 4.0 - 11.0 K/uL 4.9   ANC 1.8 - 7.7 K/uL 1.9   HGB 11.7 - 16.1 g/dL 13.3    - 440 K/uL 232   INR 0.89 - 1.29 NA    AST 10 - 37 U/L 22   ALT 5 - 40 U/L 20   Alk Phos 40 - 120 U/L 67   Bili, Total 0.2 - 1.2 mg/dL 0.1 (L)   Bili, Direct 0.0 - 0.3 mg/dL <0.2   Albumin 3.5 - 5.0 g/dL 3.6   BUN 6 - 22 mg/dL 17   Creat 0.8 - 1.4 mg/dL 1.4   Na 133 - 145 mmol/L 140   K 3.5 - 5.5 mmol/L 5.0   Cl 98 - 110 mmol/L 105   CO2 20 - 32 mmol/L 24   Glucose 70 - 99 mg/dL 89   Magnesium 1.6 - 2.5 mg/dL 2.3     SEROLOGIES:  2/2012. HAV total negative, HBsAntigen negative, anti-HBcore negative, anti-HBsurface negative, anti-HCV negative, Ferritin 20, DIONICIO negative, ASMA negative, AMA negative, ceruloplsmin 34, alpha-1-antitrypsin 195, A1AT phenotype MM.  2/2013.   Ferritin 434, ASMA weak positive, CMV IgG positive, EBV

## 2023-05-17 RX ORDER — FUROSEMIDE 40 MG/1
TABLET ORAL
Qty: 90 TABLET | Refills: 3 | Status: SHIPPED | OUTPATIENT
Start: 2023-05-17

## 2023-05-25 LAB
A/G RATIO: 1.5 RATIO (ref 1.1–2.6)
ALBUMIN SERPL-MCNC: 4 G/DL (ref 3.5–5)
ALP BLD-CCNC: 70 U/L (ref 40–120)
ALT SERPL-CCNC: 17 U/L (ref 5–40)
ANION GAP SERPL CALCULATED.3IONS-SCNC: 9 MMOL/L (ref 3–15)
AST SERPL-CCNC: 21 U/L (ref 10–37)
BASOPHILS # BLD: 1 % (ref 0–2)
BASOPHILS ABSOLUTE: 0 K/UL (ref 0–0.2)
BILIRUB SERPL-MCNC: 0.2 MG/DL (ref 0.2–1.2)
BILIRUBIN DIRECT: <0.2 MG/DL (ref 0–0.3)
BUN BLDV-MCNC: 19 MG/DL (ref 6–22)
CALCIUM SERPL-MCNC: 9.2 MG/DL (ref 8.4–10.5)
CHLORIDE BLD-SCNC: 99 MMOL/L (ref 98–110)
CO2: 25 MMOL/L (ref 20–32)
CREAT SERPL-MCNC: 1.6 MG/DL (ref 0.8–1.4)
EOSINOPHIL # BLD: 1 % (ref 0–6)
EOSINOPHILS ABSOLUTE: 0 K/UL (ref 0–0.5)
GLOBULIN: 2.6 G/DL (ref 2–4)
GLOMERULAR FILTRATION RATE: 33.9 ML/MIN/1.73 SQ.M.
GLUCOSE: 91 MG/DL (ref 70–99)
HCT VFR BLD CALC: 39.1 % (ref 35.1–48.3)
HEMOGLOBIN: 12.4 G/DL (ref 11.7–16.1)
LYMPHOCYTES # BLD: 47 % (ref 20–45)
LYMPHOCYTES ABSOLUTE: 2.6 K/UL (ref 1–4.8)
MAGNESIUM: 2.3 MG/DL (ref 1.6–2.5)
MCH RBC QN AUTO: 29 PG (ref 26–34)
MCHC RBC AUTO-ENTMCNC: 32 G/DL (ref 31–36)
MCV RBC AUTO: 91 FL (ref 80–99)
MONOCYTES ABSOLUTE: 0.4 K/UL (ref 0.1–1)
MONOCYTES: 7 % (ref 3–12)
NEUTROPHILS ABSOLUTE: 2.4 K/UL (ref 1.8–7.7)
NEUTROPHILS: 44 % (ref 40–75)
PDW BLD-RTO: 12.9 % (ref 10–15.5)
PLATELET # BLD: 262 K/UL (ref 140–440)
PMV BLD AUTO: 10 FL (ref 9–13)
POTASSIUM SERPL-SCNC: 5 MMOL/L (ref 3.5–5.5)
RAPAMUNE (SIROLIMUS): 3.2 NG/ML (ref 3–20)
RBC: 4.31 M/UL (ref 3.8–5.2)
SODIUM BLD-SCNC: 133 MMOL/L (ref 133–145)
TOTAL PROTEIN: 6.6 G/DL (ref 6.2–8.1)
WBC: 5.5 K/UL (ref 4–11)

## 2023-05-31 ENCOUNTER — TELEPHONE (OUTPATIENT)
Age: 68
End: 2023-05-31

## 2023-05-31 NOTE — TELEPHONE ENCOUNTER
LVM regarding recent lab results from 5/25/23 informing patient her labs are stable and within normal limits. Recommended patient give me a call with any questions or concerns.

## 2023-06-22 LAB
A/G RATIO: 1.8 RATIO (ref 1.1–2.6)
ALBUMIN SERPL-MCNC: 3.9 G/DL (ref 3.5–5)
ALP BLD-CCNC: 63 U/L (ref 40–120)
ALT SERPL-CCNC: 16 U/L (ref 5–40)
ANION GAP SERPL CALCULATED.3IONS-SCNC: 8 MMOL/L (ref 3–15)
AST SERPL-CCNC: 19 U/L (ref 10–37)
BASOPHILS # BLD: 0 % (ref 0–2)
BASOPHILS ABSOLUTE: 0 K/UL (ref 0–0.2)
BILIRUB SERPL-MCNC: 0.2 MG/DL (ref 0.2–1.2)
BILIRUBIN DIRECT: <0.2 MG/DL (ref 0–0.3)
BUN BLDV-MCNC: 16 MG/DL (ref 6–22)
CALCIUM SERPL-MCNC: 9.7 MG/DL (ref 8.4–10.5)
CHLORIDE BLD-SCNC: 105 MMOL/L (ref 98–110)
CO2: 25 MMOL/L (ref 20–32)
CREAT SERPL-MCNC: 1.5 MG/DL (ref 0.8–1.4)
EOSINOPHIL # BLD: 1 % (ref 0–6)
EOSINOPHILS ABSOLUTE: 0 K/UL (ref 0–0.5)
GLOBULIN: 2.2 G/DL (ref 2–4)
GLOMERULAR FILTRATION RATE: 36.6 ML/MIN/1.73 SQ.M.
GLUCOSE: 81 MG/DL (ref 70–99)
HCT VFR BLD CALC: 37.4 % (ref 35.1–48.3)
HEMOGLOBIN: 12 G/DL (ref 11.7–16.1)
LYMPHOCYTES # BLD: 40 % (ref 20–45)
LYMPHOCYTES ABSOLUTE: 2.6 K/UL (ref 1–4.8)
MAGNESIUM: 2 MG/DL (ref 1.6–2.5)
MCH RBC QN AUTO: 29 PG (ref 26–34)
MCHC RBC AUTO-ENTMCNC: 32 G/DL (ref 31–36)
MCV RBC AUTO: 89 FL (ref 80–99)
MONOCYTES ABSOLUTE: 0.4 K/UL (ref 0.1–1)
MONOCYTES: 7 % (ref 3–12)
NEUTROPHILS ABSOLUTE: 3.4 K/UL (ref 1.8–7.7)
NEUTROPHILS: 52 % (ref 40–75)
PDW BLD-RTO: 12.8 % (ref 10–15.5)
PLATELET # BLD: 258 K/UL (ref 140–440)
PMV BLD AUTO: 9.9 FL (ref 9–13)
POTASSIUM SERPL-SCNC: 4.5 MMOL/L (ref 3.5–5.5)
RAPAMUNE (SIROLIMUS): 3.3 NG/ML (ref 3–20)
RBC: 4.21 M/UL (ref 3.8–5.2)
SODIUM BLD-SCNC: 138 MMOL/L (ref 133–145)
TOTAL PROTEIN: 6.1 G/DL (ref 6.2–8.1)
WBC: 6.5 K/UL (ref 4–11)

## 2023-06-26 ENCOUNTER — TELEPHONE (OUTPATIENT)
Age: 68
End: 2023-06-26

## 2023-07-22 LAB
A/G RATIO: 1.4 RATIO (ref 1.1–2.6)
ALBUMIN SERPL-MCNC: 3.9 G/DL (ref 3.5–5)
ALP BLD-CCNC: 69 U/L (ref 40–120)
ALT SERPL-CCNC: 15 U/L (ref 5–40)
ANION GAP SERPL CALCULATED.3IONS-SCNC: 13 MMOL/L (ref 3–15)
AST SERPL-CCNC: 16 U/L (ref 10–37)
BASOPHILS # BLD: 0 % (ref 0–2)
BASOPHILS ABSOLUTE: 0 K/UL (ref 0–0.2)
BILIRUB SERPL-MCNC: 0.2 MG/DL (ref 0.2–1.2)
BILIRUBIN DIRECT: <0.2 MG/DL (ref 0–0.3)
BUN BLDV-MCNC: 20 MG/DL (ref 6–22)
CALCIUM SERPL-MCNC: 9.2 MG/DL (ref 8.4–10.5)
CHLORIDE BLD-SCNC: 103 MMOL/L (ref 98–110)
CO2: 21 MMOL/L (ref 20–32)
CREAT SERPL-MCNC: 1.6 MG/DL (ref 0.8–1.4)
EOSINOPHIL # BLD: 1 % (ref 0–6)
EOSINOPHILS ABSOLUTE: 0.1 K/UL (ref 0–0.5)
GLOBULIN: 2.7 G/DL (ref 2–4)
GLOMERULAR FILTRATION RATE: 34 ML/MIN/1.73 SQ.M.
GLUCOSE: 60 MG/DL (ref 70–99)
HCT VFR BLD CALC: 40 % (ref 35.1–48.3)
HEMOGLOBIN: 12.9 G/DL (ref 11.7–16.1)
LYMPHOCYTES # BLD: 45 % (ref 20–45)
LYMPHOCYTES ABSOLUTE: 2.8 K/UL (ref 1–4.8)
MAGNESIUM: 2.1 MG/DL (ref 1.6–2.5)
MCH RBC QN AUTO: 29 PG (ref 26–34)
MCHC RBC AUTO-ENTMCNC: 32 G/DL (ref 31–36)
MCV RBC AUTO: 89 FL (ref 80–99)
MONOCYTES ABSOLUTE: 0.4 K/UL (ref 0.1–1)
MONOCYTES: 6 % (ref 3–12)
NEUTROPHILS ABSOLUTE: 2.9 K/UL (ref 1.8–7.7)
NEUTROPHILS: 48 % (ref 40–75)
PDW BLD-RTO: 13.1 % (ref 10–15.5)
PLATELET # BLD: 262 K/UL (ref 140–440)
PMV BLD AUTO: 10.4 FL (ref 9–13)
POTASSIUM SERPL-SCNC: 4.3 MMOL/L (ref 3.5–5.5)
RAPAMUNE (SIROLIMUS): 4 NG/ML (ref 3–20)
RBC: 4.48 M/UL (ref 3.8–5.2)
SODIUM BLD-SCNC: 137 MMOL/L (ref 133–145)
TOTAL PROTEIN: 6.6 G/DL (ref 6.2–8.1)
WBC: 6.2 K/UL (ref 4–11)

## 2023-07-27 ENCOUNTER — OFFICE VISIT (OUTPATIENT)
Age: 68
End: 2023-07-27
Payer: MEDICARE

## 2023-07-27 VITALS
TEMPERATURE: 97.4 F | OXYGEN SATURATION: 99 % | BODY MASS INDEX: 32.6 KG/M2 | RESPIRATION RATE: 20 BRPM | SYSTOLIC BLOOD PRESSURE: 90 MMHG | DIASTOLIC BLOOD PRESSURE: 49 MMHG | HEIGHT: 66 IN | HEART RATE: 77 BPM

## 2023-07-27 DIAGNOSIS — Z94.4 LIVER TRANSPLANT STATUS (HCC): Primary | ICD-10-CM

## 2023-07-27 PROCEDURE — G8400 PT W/DXA NO RESULTS DOC: HCPCS | Performed by: NURSE PRACTITIONER

## 2023-07-27 PROCEDURE — G8427 DOCREV CUR MEDS BY ELIG CLIN: HCPCS | Performed by: NURSE PRACTITIONER

## 2023-07-27 PROCEDURE — 3017F COLORECTAL CA SCREEN DOC REV: CPT | Performed by: NURSE PRACTITIONER

## 2023-07-27 PROCEDURE — 1090F PRES/ABSN URINE INCON ASSESS: CPT | Performed by: NURSE PRACTITIONER

## 2023-07-27 PROCEDURE — 4004F PT TOBACCO SCREEN RCVD TLK: CPT | Performed by: NURSE PRACTITIONER

## 2023-07-27 PROCEDURE — G8417 CALC BMI ABV UP PARAM F/U: HCPCS | Performed by: NURSE PRACTITIONER

## 2023-07-27 PROCEDURE — 1123F ACP DISCUSS/DSCN MKR DOCD: CPT | Performed by: NURSE PRACTITIONER

## 2023-07-27 PROCEDURE — 99214 OFFICE O/P EST MOD 30 MIN: CPT | Performed by: NURSE PRACTITIONER

## 2023-07-27 NOTE — PROGRESS NOTES
MD Aden, FACP, [place], [place]      RACIEL Echeverria-MARYANNE Delgado S Cora, [facility]-BC   Kathleen Jacksons, [facility]   Yordan Weston, FNP-C  Keith Feliciano FNP-C   Joceline Coles, [facility]   Yaron Gong, FNP-BC      [address] [facility] [place] [facility], [address]   at [facility]   [address] [facility], 1301 [facility], 13[address] [facility]   [phone]   FAX: [address]   at [facility], 8[address] [facility], [address]   [phone]   FAX: [phone]         Patient Care Team:  David Pearson MD as PCP - General (Family Medicine)  Hailey Astorga MD as Physician (Surgery Physician)  Twanna Nyhan, MD as Physician (Obstetrics & Gynecology)  Chaz Boyd MD as Physician (Neurosurgery)  Reinier Lr DDS as Physician (Dental General Practice)  Sonu Busby O.D. as Physician (Optometry)  Sharyle Glory, MD (Gastroenterology)  Vladimir Chu MD (Internal Medicine Physician)  Murphy Hicks MD (Hematology and Oncology)  Colette Guerrier DO (Rheumatology Internal Medicine)  Jeny Pearson MD (Dermatology Physician)  Dom Bena MD (Endocrinology Physician)  Krystal Kerr, [address] (Podiatry)  Ewelina Patino, RN as Nurse Navigator (Hepatology)      Patient Active Problem List   Diagnosis    H/O liver transplant [facility])    Diabetes mellitus ([address])    Complication of transplanted liver ([address])    Breast cancer [facility])    H/O shoulder surgery    Long term current use of immunosuppressive drug    Colon polyps    Back pain, lumbosacral       Olayinka Mckee returns to the [address] [facility] [facility] of [facility] for management of liver allograft function, to adjust immune suppression.   The active problem list, all pertinent past medical history, medications, liver histology, endoscopic studies,
Symptomatic anemia

## 2023-08-18 ENCOUNTER — TELEPHONE (OUTPATIENT)
Age: 68
End: 2023-08-18

## 2023-08-18 LAB
A/G RATIO: 1.5 RATIO (ref 1.1–2.6)
ALBUMIN SERPL-MCNC: 3.7 G/DL (ref 3.5–5)
ALP BLD-CCNC: 63 U/L (ref 40–120)
ALT SERPL-CCNC: 15 U/L (ref 5–40)
ANION GAP SERPL CALCULATED.3IONS-SCNC: 10 MMOL/L (ref 3–15)
AST SERPL-CCNC: 19 U/L (ref 10–37)
BASOPHILS # BLD: 0 % (ref 0–2)
BASOPHILS ABSOLUTE: 0 K/UL (ref 0–0.2)
BILIRUB SERPL-MCNC: 0.2 MG/DL (ref 0.2–1.2)
BILIRUBIN DIRECT: <0.2 MG/DL (ref 0–0.3)
BUN BLDV-MCNC: 15 MG/DL (ref 6–22)
CALCIUM SERPL-MCNC: 8.8 MG/DL (ref 8.4–10.5)
CHLORIDE BLD-SCNC: 101 MMOL/L (ref 98–110)
CO2: 25 MMOL/L (ref 20–32)
CREAT SERPL-MCNC: 1.5 MG/DL (ref 0.8–1.4)
EOSINOPHIL # BLD: 1 % (ref 0–6)
EOSINOPHILS ABSOLUTE: 0.1 K/UL (ref 0–0.5)
GLOBULIN: 2.4 G/DL (ref 2–4)
GLOMERULAR FILTRATION RATE: 36.6 ML/MIN/1.73 SQ.M.
GLUCOSE: 59 MG/DL (ref 70–99)
HCT VFR BLD CALC: 37.3 % (ref 35.1–48.3)
HEMOGLOBIN: 11.5 G/DL (ref 11.7–16.1)
LYMPHOCYTES # BLD: 43 % (ref 20–45)
LYMPHOCYTES ABSOLUTE: 2.8 K/UL (ref 1–4.8)
MAGNESIUM: 2.4 MG/DL (ref 1.6–2.5)
MCH RBC QN AUTO: 29 PG (ref 26–34)
MCHC RBC AUTO-ENTMCNC: 31 G/DL (ref 31–36)
MCV RBC AUTO: 93 FL (ref 80–99)
MONOCYTES ABSOLUTE: 0.6 K/UL (ref 0.1–1)
MONOCYTES: 8 % (ref 3–12)
NEUTROPHILS ABSOLUTE: 3.1 K/UL (ref 1.8–7.7)
NEUTROPHILS: 47 % (ref 40–75)
PDW BLD-RTO: 13 % (ref 10–15.5)
PLATELET # BLD: 260 K/UL (ref 140–440)
PMV BLD AUTO: 10.2 FL (ref 9–13)
POTASSIUM SERPL-SCNC: 4.8 MMOL/L (ref 3.5–5.5)
RAPAMUNE (SIROLIMUS): 3.7 NG/ML (ref 3–20)
RBC: 4.02 M/UL (ref 3.8–5.2)
SODIUM BLD-SCNC: 136 MMOL/L (ref 133–145)
TOTAL PROTEIN: 6.1 G/DL (ref 6.2–8.1)
WBC: 6.6 K/UL (ref 4–11)

## 2023-08-18 NOTE — TELEPHONE ENCOUNTER
M informing patient her lab results from 8/16/23 are stable and within normal limits. Requested patient give me a call with any questions.

## 2023-09-22 LAB
A/G RATIO: 1.7 RATIO (ref 1.1–2.6)
ALBUMIN SERPL-MCNC: 4 G/DL (ref 3.5–5)
ALP BLD-CCNC: 67 U/L (ref 40–120)
ALT SERPL-CCNC: 16 U/L (ref 5–40)
ANION GAP SERPL CALCULATED.3IONS-SCNC: 9 MMOL/L (ref 3–15)
AST SERPL-CCNC: 17 U/L (ref 10–37)
BASOPHILS # BLD: 1 % (ref 0–2)
BASOPHILS ABSOLUTE: 0 K/UL (ref 0–0.2)
BILIRUB SERPL-MCNC: 0.2 MG/DL (ref 0.2–1.2)
BILIRUBIN DIRECT: <0.2 MG/DL (ref 0–0.3)
BUN BLDV-MCNC: 28 MG/DL (ref 6–22)
CALCIUM SERPL-MCNC: 8.8 MG/DL (ref 8.4–10.5)
CHLORIDE BLD-SCNC: 100 MMOL/L (ref 98–110)
CO2: 27 MMOL/L (ref 20–32)
CREAT SERPL-MCNC: 1.8 MG/DL (ref 0.8–1.4)
EOSINOPHIL # BLD: 1 % (ref 0–6)
EOSINOPHILS ABSOLUTE: 0.1 K/UL (ref 0–0.5)
GLOBULIN: 2.3 G/DL (ref 2–4)
GLOMERULAR FILTRATION RATE: 29.7 ML/MIN/1.73 SQ.M.
GLUCOSE: 71 MG/DL (ref 70–99)
HCT VFR BLD CALC: 38 % (ref 35.1–48.3)
HEMOGLOBIN: 12.1 G/DL (ref 11.7–16.1)
LYMPHOCYTES # BLD: 42 % (ref 20–45)
LYMPHOCYTES ABSOLUTE: 2.7 K/UL (ref 1–4.8)
MAGNESIUM: 2.3 MG/DL (ref 1.6–2.5)
MCH RBC QN AUTO: 28 PG (ref 26–34)
MCHC RBC AUTO-ENTMCNC: 32 G/DL (ref 31–36)
MCV RBC AUTO: 89 FL (ref 80–99)
MONOCYTES ABSOLUTE: 0.5 K/UL (ref 0.1–1)
MONOCYTES: 8 % (ref 3–12)
NEUTROPHILS ABSOLUTE: 3 K/UL (ref 1.8–7.7)
NEUTROPHILS: 48 % (ref 40–75)
PDW BLD-RTO: 12.7 % (ref 10–15.5)
PLATELET # BLD: 259 K/UL (ref 140–440)
PMV BLD AUTO: 10.2 FL (ref 9–13)
POTASSIUM SERPL-SCNC: 4.7 MMOL/L (ref 3.5–5.5)
RAPAMUNE (SIROLIMUS): 3.4 NG/ML (ref 3–20)
RBC: 4.29 M/UL (ref 3.8–5.2)
SODIUM BLD-SCNC: 136 MMOL/L (ref 133–145)
TOTAL PROTEIN: 6.3 G/DL (ref 6.2–8.1)
WBC: 6.3 K/UL (ref 4–11)

## 2023-09-25 ENCOUNTER — TELEPHONE (OUTPATIENT)
Age: 68
End: 2023-09-25

## 2023-09-25 NOTE — TELEPHONE ENCOUNTER
Called patient to review recent lab results from 9/22/23. Informed patient all labs are stable except for a slight elevation in the BUN/Cr levels. Requested patient have her labs redrawn during week of 10/9/23. Patient verbally confirmed understanding.

## 2023-10-09 LAB
A/G RATIO: 1.4 RATIO (ref 1.1–2.6)
ALBUMIN SERPL-MCNC: 3.8 G/DL (ref 3.5–5)
ALP BLD-CCNC: 80 U/L (ref 40–120)
ALT SERPL-CCNC: 14 U/L (ref 5–40)
ANION GAP SERPL CALCULATED.3IONS-SCNC: 13 MMOL/L (ref 3–15)
AST SERPL-CCNC: 15 U/L (ref 10–37)
BASOPHILS # BLD: 1 % (ref 0–2)
BASOPHILS ABSOLUTE: 0 K/UL (ref 0–0.2)
BILIRUB SERPL-MCNC: 0.2 MG/DL (ref 0.2–1.2)
BILIRUBIN DIRECT: <0.2 MG/DL (ref 0–0.3)
BUN BLDV-MCNC: 13 MG/DL (ref 6–22)
CALCIUM SERPL-MCNC: 9.1 MG/DL (ref 8.4–10.5)
CHLORIDE BLD-SCNC: 105 MMOL/L (ref 98–110)
CO2: 23 MMOL/L (ref 20–32)
CREAT SERPL-MCNC: 1.6 MG/DL (ref 0.8–1.4)
EOSINOPHIL # BLD: 1 % (ref 0–6)
EOSINOPHILS ABSOLUTE: 0.1 K/UL (ref 0–0.5)
GLOBULIN: 2.8 G/DL (ref 2–4)
GLOMERULAR FILTRATION RATE: 33.8 ML/MIN/1.73 SQ.M.
GLUCOSE: 96 MG/DL (ref 70–99)
HCT VFR BLD CALC: 38.6 % (ref 35.1–48.3)
HEMOGLOBIN: 12.2 G/DL (ref 11.7–16.1)
LYMPHOCYTES # BLD: 44 % (ref 20–45)
LYMPHOCYTES ABSOLUTE: 2.7 K/UL (ref 1–4.8)
MAGNESIUM: 2.1 MG/DL (ref 1.6–2.5)
MCH RBC QN AUTO: 28 PG (ref 26–34)
MCHC RBC AUTO-ENTMCNC: 32 G/DL (ref 31–36)
MCV RBC AUTO: 90 FL (ref 80–99)
MONOCYTES ABSOLUTE: 0.4 K/UL (ref 0.1–1)
MONOCYTES: 6 % (ref 3–12)
NEUTROPHILS ABSOLUTE: 2.8 K/UL (ref 1.8–7.7)
NEUTROPHILS: 47 % (ref 40–75)
PDW BLD-RTO: 12.8 % (ref 10–15.5)
PLATELET # BLD: 263 K/UL (ref 140–440)
PMV BLD AUTO: 9.9 FL (ref 9–13)
POTASSIUM SERPL-SCNC: 4.4 MMOL/L (ref 3.5–5.5)
RAPAMUNE (SIROLIMUS): 4 NG/ML (ref 3–20)
RBC: 4.31 M/UL (ref 3.8–5.2)
SODIUM BLD-SCNC: 141 MMOL/L (ref 133–145)
TOTAL PROTEIN: 6.6 G/DL (ref 6.2–8.1)
WBC: 6 K/UL (ref 4–11)

## 2023-10-10 ENCOUNTER — TELEPHONE (OUTPATIENT)
Age: 68
End: 2023-10-10

## 2023-10-10 NOTE — TELEPHONE ENCOUNTER
Called patient to review recent lab results from 10/9/23. Informed patient her lab results are within normal limits except for Creatnine @ 1.6, down from 1.8 on 9/22/23. Patient will have labs redrawn prior to her appointment with Joe Cotton NP, on 10/27/23. Pt verbally confirmed understanding.

## 2023-10-28 LAB
A/G RATIO: 1.2 RATIO (ref 1.1–2.6)
ALBUMIN SERPL-MCNC: 3.7 G/DL (ref 3.5–5)
ALP BLD-CCNC: 75 U/L (ref 40–120)
ALT SERPL-CCNC: 14 U/L (ref 5–40)
ANION GAP SERPL CALCULATED.3IONS-SCNC: 9 MMOL/L (ref 3–15)
AST SERPL-CCNC: 20 U/L (ref 10–37)
BASOPHILS # BLD: 0 % (ref 0–2)
BASOPHILS ABSOLUTE: 0 K/UL (ref 0–0.2)
BILIRUB SERPL-MCNC: 0.3 MG/DL (ref 0.2–1.2)
BILIRUBIN DIRECT: <0.2 MG/DL (ref 0–0.3)
BUN BLDV-MCNC: 19 MG/DL (ref 6–22)
CALCIUM SERPL-MCNC: 9.1 MG/DL (ref 8.4–10.5)
CHLORIDE BLD-SCNC: 101 MMOL/L (ref 98–110)
CO2: 25 MMOL/L (ref 20–32)
CREAT SERPL-MCNC: 1.5 MG/DL (ref 0.8–1.4)
EOSINOPHIL # BLD: 0 % (ref 0–6)
EOSINOPHILS ABSOLUTE: 0 K/UL (ref 0–0.5)
GLOBULIN: 3.2 G/DL (ref 2–4)
GLOMERULAR FILTRATION RATE: 36.4 ML/MIN/1.73 SQ.M.
GLUCOSE: 82 MG/DL (ref 70–99)
HCT VFR BLD CALC: 38.4 % (ref 35.1–48.3)
HEMOGLOBIN: 12 G/DL (ref 11.7–16.1)
LYMPHOCYTES # BLD: 45 % (ref 20–45)
LYMPHOCYTES ABSOLUTE: 2.7 K/UL (ref 1–4.8)
MAGNESIUM: 2.3 MG/DL (ref 1.6–2.5)
MCH RBC QN AUTO: 28 PG (ref 26–34)
MCHC RBC AUTO-ENTMCNC: 31 G/DL (ref 31–36)
MCV RBC AUTO: 90 FL (ref 80–99)
MONOCYTES ABSOLUTE: 0.4 K/UL (ref 0.1–1)
MONOCYTES: 7 % (ref 3–12)
NEUTROPHILS ABSOLUTE: 2.8 K/UL (ref 1.8–7.7)
NEUTROPHILS: 47 % (ref 40–75)
PDW BLD-RTO: 13.2 % (ref 10–15.5)
PLATELET # BLD: 279 K/UL (ref 140–440)
PMV BLD AUTO: 9.7 FL (ref 9–13)
POTASSIUM SERPL-SCNC: 5.4 MMOL/L (ref 3.5–5.5)
RAPAMUNE (SIROLIMUS): 3.9 NG/ML (ref 3–20)
RBC: 4.26 M/UL (ref 3.8–5.2)
SODIUM BLD-SCNC: 135 MMOL/L (ref 133–145)
TOTAL PROTEIN: 6.9 G/DL (ref 6.2–8.1)
WBC: 5.9 K/UL (ref 4–11)

## 2023-10-30 ENCOUNTER — OFFICE VISIT (OUTPATIENT)
Age: 68
End: 2023-10-30
Payer: MEDICARE

## 2023-10-30 VITALS
HEART RATE: 88 BPM | DIASTOLIC BLOOD PRESSURE: 51 MMHG | WEIGHT: 181.5 LBS | OXYGEN SATURATION: 98 % | HEIGHT: 66 IN | TEMPERATURE: 97.8 F | BODY MASS INDEX: 29.17 KG/M2 | SYSTOLIC BLOOD PRESSURE: 114 MMHG

## 2023-10-30 DIAGNOSIS — Z94.4 LIVER TRANSPLANT STATUS (HCC): Primary | ICD-10-CM

## 2023-10-30 PROCEDURE — 3017F COLORECTAL CA SCREEN DOC REV: CPT | Performed by: NURSE PRACTITIONER

## 2023-10-30 PROCEDURE — 99214 OFFICE O/P EST MOD 30 MIN: CPT | Performed by: NURSE PRACTITIONER

## 2023-10-30 PROCEDURE — 4004F PT TOBACCO SCREEN RCVD TLK: CPT | Performed by: NURSE PRACTITIONER

## 2023-10-30 PROCEDURE — 1090F PRES/ABSN URINE INCON ASSESS: CPT | Performed by: NURSE PRACTITIONER

## 2023-10-30 PROCEDURE — G8417 CALC BMI ABV UP PARAM F/U: HCPCS | Performed by: NURSE PRACTITIONER

## 2023-10-30 PROCEDURE — 1123F ACP DISCUSS/DSCN MKR DOCD: CPT | Performed by: NURSE PRACTITIONER

## 2023-10-30 PROCEDURE — G8400 PT W/DXA NO RESULTS DOC: HCPCS | Performed by: NURSE PRACTITIONER

## 2023-10-30 PROCEDURE — G8427 DOCREV CUR MEDS BY ELIG CLIN: HCPCS | Performed by: NURSE PRACTITIONER

## 2023-10-30 PROCEDURE — G8484 FLU IMMUNIZE NO ADMIN: HCPCS | Performed by: NURSE PRACTITIONER

## 2023-10-30 RX ORDER — PREGABALIN 75 MG/1
75 CAPSULE ORAL PRN
COMMUNITY
Start: 2023-07-11

## 2023-10-30 NOTE — PROGRESS NOTES
MD Aden, FACP, [place], [place]      DAVID Grigsby, [facility]-BC   Lazaro Wing, [facility]   Swetha Persaud, [facility]-C  Mihir Beatty, P-C   Mariia Abrams, [facility]   Renetta Zamarripa, FNP-BC      [address] [facility] [address], East   at [facility]   1101 [facility], 1301 [facility], 1340 [facility]   [phone]   FAX: [address]   at [facility], 8[facility], [address]   [phone]   FAX: [phone]       Patient Care Team:  Lino Cortes MD as PCP - General (Family Medicine)  Keenan Meng MD as Physician (Surgery Physician)  Thuan Lopez MD as Physician (Obstetrics & Gynecology)  Zacarias Troy MD as Physician (Neurosurgery)  Dion Donis DDS as Physician (Dental General Practice)  Iva Cortez O.D. as Physician (Optometry)  Juanita Tong MD (Gastroenterology)  Gayla Elam MD (Internal Medicine Physician)  Perlita Perez MD (Hematology and Oncology)  Saurav Miguel DO (Rheumatology Internal Medicine)  Kayley Bentley MD (Dermatology Physician)  Ezequiel Mixon MD (Endocrinology Physician)  Roxanna Guadarrama, [address] (Podiatry)  Jewel Helton, RN as Nurse Navigator (Hepatology)      Patient Active Problem List   Diagnosis    H/O liver transplant [facility])    Diabetes mellitus ([address])    Complication of transplanted liver ([address])    Breast cancer [facility])    H/O shoulder surgery    Long term current use of immunosuppressive drug    Colon polyps    Back pain, lumbosacral       Martin Trevizo returns to the [address] [facility] of [facility] for management of liver allograft function, to adjust immune suppression.   The active problem list, all pertinent past medical history, medications, liver histology, endoscopic studies,

## 2023-11-21 LAB
A/G RATIO: 1.3 RATIO (ref 1.1–2.6)
ALBUMIN SERPL-MCNC: 3.9 G/DL (ref 3.5–5)
ALP BLD-CCNC: 76 U/L (ref 40–120)
ALT SERPL-CCNC: 22 U/L (ref 5–40)
ANION GAP SERPL CALCULATED.3IONS-SCNC: 11 MMOL/L (ref 3–15)
AST SERPL-CCNC: 23 U/L (ref 10–37)
BILIRUB SERPL-MCNC: 0.2 MG/DL (ref 0.2–1.2)
BILIRUBIN DIRECT: <0.2 MG/DL (ref 0–0.3)
BUN BLDV-MCNC: 19 MG/DL (ref 6–22)
CALCIUM SERPL-MCNC: 8.8 MG/DL (ref 8.4–10.5)
CHLORIDE BLD-SCNC: 100 MMOL/L (ref 98–110)
CO2: 23 MMOL/L (ref 20–32)
CREAT SERPL-MCNC: 1.6 MG/DL (ref 0.8–1.4)
GLOBULIN: 2.9 G/DL (ref 2–4)
GLOMERULAR FILTRATION RATE: 33.8 ML/MIN/1.73 SQ.M.
GLUCOSE: 106 MG/DL (ref 70–99)
MAGNESIUM: 2.1 MG/DL (ref 1.6–2.5)
POTASSIUM SERPL-SCNC: 4.4 MMOL/L (ref 3.5–5.5)
RAPAMUNE (SIROLIMUS): 3.5 NG/ML (ref 3–20)
SODIUM BLD-SCNC: 134 MMOL/L (ref 133–145)
TOTAL PROTEIN: 6.8 G/DL (ref 6.2–8.1)

## 2023-11-22 LAB
BASOPHILS # BLD: 0 % (ref 0–2)
BASOPHILS ABSOLUTE: 0 K/UL (ref 0–0.2)
EOSINOPHIL # BLD: 1 % (ref 0–6)
EOSINOPHILS ABSOLUTE: 0 K/UL (ref 0–0.5)
HCT VFR BLD CALC: 39.3 % (ref 35.1–48.3)
HEMOGLOBIN: 12.3 G/DL (ref 11.7–16.1)
LYMPHOCYTES # BLD: 34 % (ref 20–45)
LYMPHOCYTES ABSOLUTE: 2.7 K/UL (ref 1–4.8)
Lab: NORMAL
MCH RBC QN AUTO: 29 PG (ref 26–34)
MCHC RBC AUTO-ENTMCNC: 31 G/DL (ref 31–36)
MCV RBC AUTO: 92 FL (ref 80–99)
MONOCYTES ABSOLUTE: 0.5 K/UL (ref 0.1–1)
MONOCYTES: 7 % (ref 3–12)
NEUTROPHILS ABSOLUTE: 4.7 K/UL (ref 1.8–7.7)
NEUTROPHILS: 58 % (ref 40–75)
PDW BLD-RTO: 13.5 % (ref 10–15.5)
PLATELET # BLD: 307 K/UL (ref 140–440)
PMV BLD AUTO: 10.6 FL (ref 9–13)
RBC: 4.27 M/UL (ref 3.8–5.2)
WBC: 8.1 K/UL (ref 4–11)

## 2024-01-12 LAB
A/G RATIO: 1.4 RATIO (ref 1.1–2.6)
ALBUMIN SERPL-MCNC: 3.8 G/DL (ref 3.5–5)
ALP BLD-CCNC: 77 U/L (ref 40–120)
ALT SERPL-CCNC: 17 U/L (ref 5–40)
ANION GAP SERPL CALCULATED.3IONS-SCNC: 11 MMOL/L (ref 3–15)
AST SERPL-CCNC: 23 U/L (ref 10–37)
BASOPHILS # BLD: 0 % (ref 0–2)
BASOPHILS ABSOLUTE: 0 K/UL (ref 0–0.2)
BILIRUB SERPL-MCNC: 0.2 MG/DL (ref 0.2–1.2)
BILIRUBIN DIRECT: <0.2 MG/DL (ref 0–0.3)
BUN BLDV-MCNC: 23 MG/DL (ref 6–22)
CALCIUM SERPL-MCNC: 8.7 MG/DL (ref 8.4–10.5)
CHLORIDE BLD-SCNC: 103 MMOL/L (ref 98–110)
CO2: 23 MMOL/L (ref 20–32)
CREAT SERPL-MCNC: 1.4 MG/DL (ref 0.8–1.4)
EOSINOPHIL # BLD: 1 % (ref 0–6)
EOSINOPHILS ABSOLUTE: 0.1 K/UL (ref 0–0.5)
GLOBULIN: 2.8 G/DL (ref 2–4)
GLOMERULAR FILTRATION RATE: 39.3 ML/MIN/1.73 SQ.M.
GLUCOSE: 88 MG/DL (ref 70–99)
HCT VFR BLD CALC: 37.9 % (ref 35.1–48.3)
HEMOGLOBIN: 12.2 G/DL (ref 11.7–16.1)
LYMPHOCYTES # BLD: 45 % (ref 20–45)
LYMPHOCYTES ABSOLUTE: 2.6 K/UL (ref 1–4.8)
MAGNESIUM: 2.5 MG/DL (ref 1.6–2.5)
MCH RBC QN AUTO: 29 PG (ref 26–34)
MCHC RBC AUTO-ENTMCNC: 32 G/DL (ref 31–36)
MCV RBC AUTO: 89 FL (ref 80–99)
MONOCYTES ABSOLUTE: 0.4 K/UL (ref 0.1–1)
MONOCYTES: 7 % (ref 3–12)
NEUTROPHILS ABSOLUTE: 2.7 K/UL (ref 1.8–7.7)
NEUTROPHILS: 47 % (ref 40–75)
PDW BLD-RTO: 12.9 % (ref 10–15.5)
PLATELET # BLD: 220 K/UL (ref 140–440)
PMV BLD AUTO: 10.2 FL (ref 9–13)
POTASSIUM SERPL-SCNC: 4.6 MMOL/L (ref 3.5–5.5)
RAPAMUNE (SIROLIMUS): 3.5 NG/ML (ref 3–20)
RBC: 4.27 M/UL (ref 3.8–5.2)
SODIUM BLD-SCNC: 137 MMOL/L (ref 133–145)
TOTAL PROTEIN: 6.6 G/DL (ref 6.2–8.1)
WBC: 5.8 K/UL (ref 4–11)

## 2024-01-17 ENCOUNTER — OFFICE VISIT (OUTPATIENT)
Age: 69
End: 2024-01-17
Payer: MEDICARE

## 2024-01-17 VITALS
WEIGHT: 172 LBS | DIASTOLIC BLOOD PRESSURE: 52 MMHG | SYSTOLIC BLOOD PRESSURE: 115 MMHG | TEMPERATURE: 98 F | BODY MASS INDEX: 27.64 KG/M2 | OXYGEN SATURATION: 99 % | HEART RATE: 85 BPM | HEIGHT: 66 IN

## 2024-01-17 DIAGNOSIS — Z94.4 LIVER TRANSPLANT STATUS (HCC): Primary | ICD-10-CM

## 2024-01-17 PROCEDURE — 4004F PT TOBACCO SCREEN RCVD TLK: CPT | Performed by: NURSE PRACTITIONER

## 2024-01-17 PROCEDURE — G8400 PT W/DXA NO RESULTS DOC: HCPCS | Performed by: NURSE PRACTITIONER

## 2024-01-17 PROCEDURE — G8484 FLU IMMUNIZE NO ADMIN: HCPCS | Performed by: NURSE PRACTITIONER

## 2024-01-17 PROCEDURE — 3017F COLORECTAL CA SCREEN DOC REV: CPT | Performed by: NURSE PRACTITIONER

## 2024-01-17 PROCEDURE — 1123F ACP DISCUSS/DSCN MKR DOCD: CPT | Performed by: NURSE PRACTITIONER

## 2024-01-17 PROCEDURE — 1090F PRES/ABSN URINE INCON ASSESS: CPT | Performed by: NURSE PRACTITIONER

## 2024-01-17 PROCEDURE — G8417 CALC BMI ABV UP PARAM F/U: HCPCS | Performed by: NURSE PRACTITIONER

## 2024-01-17 PROCEDURE — G8427 DOCREV CUR MEDS BY ELIG CLIN: HCPCS | Performed by: NURSE PRACTITIONER

## 2024-01-17 PROCEDURE — 99214 OFFICE O/P EST MOD 30 MIN: CPT | Performed by: NURSE PRACTITIONER

## 2024-01-17 NOTE — PROGRESS NOTES
osteoporosis.  8/2018.  TFTS.  TSH 0.38/T3 free 3.4/T4 free 1.3    25 minutes total time spent with this patient with more than 50% of this time spent counseling and coordinating care as described above.     FOLLOW-UP:  All of the issues listed above in the Assessment and Plan were discussed with the patient.  All questions were answered.  The patient expressed a clear understanding of the above.    Follow-up [facility] in 3 months for continued routine monitoring.     YEIMI Giles-MARYANNE  [facility]  [address]   [place] [address] [phone]

## 2024-01-18 DIAGNOSIS — Z94.4 H/O LIVER TRANSPLANT (HCC): Primary | ICD-10-CM

## 2024-01-18 DIAGNOSIS — Z79.899 LONG TERM CURRENT USE OF IMMUNOSUPPRESSIVE DRUG: ICD-10-CM

## 2024-01-25 DIAGNOSIS — Z94.4 H/O LIVER TRANSPLANT (HCC): Primary | ICD-10-CM

## 2024-01-25 DIAGNOSIS — Z79.899 LONG TERM CURRENT USE OF IMMUNOSUPPRESSIVE DRUG: ICD-10-CM

## 2024-02-13 DIAGNOSIS — Z79.899 LONG TERM CURRENT USE OF IMMUNOSUPPRESSIVE DRUG: ICD-10-CM

## 2024-02-13 DIAGNOSIS — Z94.4 H/O LIVER TRANSPLANT (HCC): Primary | ICD-10-CM

## 2024-02-20 LAB
A/G RATIO: 1.5 RATIO (ref 1.1–2.6)
ALBUMIN SERPL-MCNC: 3.8 G/DL (ref 3.5–5)
ALP BLD-CCNC: 71 U/L (ref 40–120)
ALT SERPL-CCNC: 19 U/L (ref 5–40)
ANION GAP SERPL CALCULATED.3IONS-SCNC: 10 MMOL/L (ref 3–15)
AST SERPL-CCNC: 23 U/L (ref 10–37)
BASOPHILS # BLD: 1 % (ref 0–2)
BASOPHILS ABSOLUTE: 0 K/UL (ref 0–0.2)
BILIRUB SERPL-MCNC: 0.3 MG/DL (ref 0.2–1.2)
BILIRUBIN DIRECT: <0.2 MG/DL (ref 0–0.3)
BUN BLDV-MCNC: 17 MG/DL (ref 6–22)
CALCIUM SERPL-MCNC: 8.7 MG/DL (ref 8.4–10.5)
CHLORIDE BLD-SCNC: 105 MMOL/L (ref 98–110)
CO2: 22 MMOL/L (ref 20–32)
CREAT SERPL-MCNC: 1.3 MG/DL (ref 0.8–1.4)
EOSINOPHIL # BLD: 1 % (ref 0–6)
EOSINOPHILS ABSOLUTE: 0.1 K/UL (ref 0–0.5)
GLOBULIN: 2.6 G/DL (ref 2–4)
GLOMERULAR FILTRATION RATE: 42.8 ML/MIN/1.73 SQ.M.
GLUCOSE: 74 MG/DL (ref 70–99)
HCT VFR BLD CALC: 38.7 % (ref 35.1–48.3)
HEMOGLOBIN: 12.3 G/DL (ref 11.7–16.1)
LYMPHOCYTES # BLD: 48 % (ref 20–45)
LYMPHOCYTES ABSOLUTE: 3 K/UL (ref 1–4.8)
MAGNESIUM: 2 MG/DL (ref 1.6–2.5)
MCH RBC QN AUTO: 28 PG (ref 26–34)
MCHC RBC AUTO-ENTMCNC: 32 G/DL (ref 31–36)
MCV RBC AUTO: 89 FL (ref 80–99)
MONOCYTES ABSOLUTE: 0.4 K/UL (ref 0.1–1)
MONOCYTES: 7 % (ref 3–12)
NEUTROPHILS ABSOLUTE: 2.8 K/UL (ref 1.8–7.7)
NEUTROPHILS: 44 % (ref 40–75)
PDW BLD-RTO: 13.2 % (ref 10–15.5)
PLATELET # BLD: 272 K/UL (ref 140–440)
PMV BLD AUTO: 10.2 FL (ref 9–13)
POTASSIUM SERPL-SCNC: 4.1 MMOL/L (ref 3.5–5.5)
RBC: 4.37 M/UL (ref 3.8–5.2)
SODIUM BLD-SCNC: 137 MMOL/L (ref 133–145)
TOTAL PROTEIN: 6.4 G/DL (ref 6.2–8.1)
WBC: 6.3 K/UL (ref 4–11)

## 2024-02-21 ENCOUNTER — TELEPHONE (OUTPATIENT)
Age: 69
End: 2024-02-21

## 2024-02-21 LAB — RAPAMUNE (SIROLIMUS): <3 NG/ML (ref 3–20)

## 2024-02-21 NOTE — TELEPHONE ENCOUNTER
LVM for patient regarding lab results from 2/20/24. Informed patient her lab results from 2/20/24 are stable.

## 2024-03-21 ENCOUNTER — TELEPHONE (OUTPATIENT)
Age: 69
End: 2024-03-21

## 2024-03-21 LAB
A/G RATIO: 1.5 RATIO (ref 1.1–2.6)
ALBUMIN SERPL-MCNC: 3.8 G/DL (ref 3.5–5)
ALP BLD-CCNC: 75 U/L (ref 40–120)
ALT SERPL-CCNC: 14 U/L (ref 5–40)
ANION GAP SERPL CALCULATED.3IONS-SCNC: 11 MMOL/L (ref 3–15)
AST SERPL-CCNC: 21 U/L (ref 10–37)
BASOPHILS # BLD: 0 % (ref 0–2)
BASOPHILS ABSOLUTE: 0 K/UL (ref 0–0.2)
BILIRUB SERPL-MCNC: 0.3 MG/DL (ref 0.2–1.2)
BILIRUBIN DIRECT: <0.2 MG/DL (ref 0–0.3)
BUN BLDV-MCNC: 16 MG/DL (ref 6–22)
CALCIUM SERPL-MCNC: 9.4 MG/DL (ref 8.4–10.5)
CHLORIDE BLD-SCNC: 102 MMOL/L (ref 98–110)
CO2: 26 MMOL/L (ref 20–32)
CREAT SERPL-MCNC: 1.3 MG/DL (ref 0.8–1.4)
EOSINOPHIL # BLD: 1 % (ref 0–6)
EOSINOPHILS ABSOLUTE: 0 K/UL (ref 0–0.5)
GLOBULIN: 2.5 G/DL (ref 2–4)
GLOMERULAR FILTRATION RATE: 42.8 ML/MIN/1.73 SQ.M.
GLUCOSE: 75 MG/DL (ref 70–99)
HCT VFR BLD CALC: 42.7 % (ref 35.1–48.3)
HEMOGLOBIN: 13.3 G/DL (ref 11.7–16.1)
LYMPHOCYTES # BLD: 45 % (ref 20–45)
LYMPHOCYTES ABSOLUTE: 2.3 K/UL (ref 1–4.8)
MAGNESIUM: 2.1 MG/DL (ref 1.6–2.5)
MCH RBC QN AUTO: 28 PG (ref 26–34)
MCHC RBC AUTO-ENTMCNC: 31 G/DL (ref 31–36)
MCV RBC AUTO: 90 FL (ref 80–99)
MONOCYTES ABSOLUTE: 0.4 K/UL (ref 0.1–1)
MONOCYTES: 7 % (ref 3–12)
NEUTROPHILS ABSOLUTE: 2.4 K/UL (ref 1.8–7.7)
NEUTROPHILS: 47 % (ref 40–75)
PDW BLD-RTO: 13.3 % (ref 10–15.5)
PLATELET # BLD: 262 K/UL (ref 140–440)
PMV BLD AUTO: 10.9 FL (ref 9–13)
POTASSIUM SERPL-SCNC: 4.2 MMOL/L (ref 3.5–5.5)
RAPAMUNE (SIROLIMUS): 4.5 NG/ML (ref 3–20)
RBC: 4.77 M/UL (ref 3.8–5.2)
SODIUM BLD-SCNC: 139 MMOL/L (ref 133–145)
TOTAL PROTEIN: 6.3 G/DL (ref 6.2–8.1)
WBC: 5.2 K/UL (ref 4–11)

## 2024-03-21 NOTE — TELEPHONE ENCOUNTER
Called patient to review recent lab results from 3/20/24. Informed patient her lab results are perfectly within normal limits. Patient verbally confirmed she followed the 24 hour trough to secure an accurate sirolimus level and it showed.

## 2024-04-21 LAB
A/G RATIO: 1.4 RATIO (ref 1.1–2.6)
ALBUMIN: 3.7 G/DL (ref 3.5–5)
ALP BLD-CCNC: 73 U/L (ref 40–120)
ALT SERPL-CCNC: 15 U/L (ref 5–40)
ANION GAP SERPL CALCULATED.3IONS-SCNC: 10 MMOL/L (ref 3–15)
AST SERPL-CCNC: 22 U/L (ref 10–37)
BASOPHILS # BLD: 0 % (ref 0–2)
BASOPHILS ABSOLUTE: 0 K/UL (ref 0–0.2)
BILIRUB SERPL-MCNC: 0.1 MG/DL (ref 0.2–1.2)
BILIRUBIN DIRECT: <0.2 MG/DL (ref 0–0.3)
BUN BLDV-MCNC: 15 MG/DL (ref 6–22)
CALCIUM SERPL-MCNC: 8.7 MG/DL (ref 8.4–10.5)
CHLORIDE BLD-SCNC: 104 MMOL/L (ref 98–110)
CO2: 24 MMOL/L (ref 20–32)
CREAT SERPL-MCNC: 1.2 MG/DL (ref 0.8–1.4)
EOSINOPHIL # BLD: 1 % (ref 0–6)
EOSINOPHILS ABSOLUTE: 0.1 K/UL (ref 0–0.5)
GLOBULIN: 2.6 G/DL (ref 2–4)
GLOMERULAR FILTRATION RATE: 46.9 ML/MIN/1.73 SQ.M.
GLUCOSE: 85 MG/DL (ref 70–99)
HCT VFR BLD CALC: 43.6 % (ref 35.1–48.3)
HEMOGLOBIN: 12.9 G/DL (ref 11.7–16.1)
LYMPHOCYTES # BLD: 48 % (ref 20–45)
LYMPHOCYTES ABSOLUTE: 2.5 K/UL (ref 1–4.8)
MAGNESIUM: 2.1 MG/DL (ref 1.6–2.5)
MCH RBC QN AUTO: 28 PG (ref 26–34)
MCHC RBC AUTO-ENTMCNC: 30 G/DL (ref 31–36)
MCV RBC AUTO: 94 FL (ref 80–99)
MONOCYTES ABSOLUTE: 0.4 K/UL (ref 0.1–1)
MONOCYTES: 7 % (ref 3–12)
NEUTROPHILS ABSOLUTE: 2.2 K/UL (ref 1.8–7.7)
NEUTROPHILS: 43 % (ref 40–75)
PDW BLD-RTO: 13.9 % (ref 10–15.5)
PLATELET # BLD: 251 K/UL (ref 140–440)
PMV BLD AUTO: 10.6 FL (ref 9–13)
POTASSIUM SERPL-SCNC: 4.7 MMOL/L (ref 3.5–5.5)
RAPAMUNE (SIROLIMUS): 4.9 NG/ML (ref 3–20)
RBC: 4.62 M/UL (ref 3.8–5.2)
SODIUM BLD-SCNC: 138 MMOL/L (ref 133–145)
TOTAL PROTEIN: 6.3 G/DL (ref 6.2–8.1)
WBC: 5.2 K/UL (ref 4–11)

## 2024-04-25 ENCOUNTER — OFFICE VISIT (OUTPATIENT)
Age: 69
End: 2024-04-25
Payer: MEDICARE

## 2024-04-25 VITALS
WEIGHT: 172 LBS | OXYGEN SATURATION: 98 % | HEART RATE: 85 BPM | RESPIRATION RATE: 18 BRPM | TEMPERATURE: 98.3 F | HEIGHT: 66 IN | SYSTOLIC BLOOD PRESSURE: 138 MMHG | DIASTOLIC BLOOD PRESSURE: 63 MMHG | BODY MASS INDEX: 27.64 KG/M2

## 2024-04-25 DIAGNOSIS — Z94.4 LIVER TRANSPLANT STATUS (HCC): Primary | ICD-10-CM

## 2024-04-25 PROCEDURE — 91200 LIVER ELASTOGRAPHY: CPT | Performed by: NURSE PRACTITIONER

## 2024-04-25 NOTE — PROGRESS NOTES
06/2018.  Ultrasound of the liver.   Mixed echogenicity, complex collection in the medial right hepatic lobe most in keeping with hematoma.  05/2019.  Pelvic MRI w/wo contrast.  Enhancing endocervical canal 1.5 cm lesion.  02/2020.  Abdominal Ultrasound.  The liver appeared diffusely echogenic.  No solid mass.    OTHER TESTING:.   2/2013.  ETOH negative.  8/2013:  DEXA scan - osteoporosis   10/2015.  DEXA scan osteoporosis.  8/2018.  TFTS.  TSH 0.38/T3 free 3.4/T4 free 1.3    25 minutes total time spent with this patient with more than 50% of this time spent counseling and coordinating care as described above.     FOLLOW-UP:  All of the issues listed above in the Assessment and Plan were discussed with the patient.  All questions were answered.  The patient expressed a clear understanding of the above.    [facility] for continued routine monitoring.     ROLANDO Giles  [facility]  [address] [facility] [address]   [place] [address] [phone]

## 2024-04-29 RX ORDER — FUROSEMIDE 40 MG/1
TABLET ORAL
Qty: 90 TABLET | Refills: 0 | Status: SHIPPED | OUTPATIENT
Start: 2024-04-29

## 2024-05-18 LAB
A/G RATIO: 1.5 RATIO (ref 1.1–2.6)
ALBUMIN: 4 G/DL (ref 3.5–5)
ALP BLD-CCNC: 68 U/L (ref 40–120)
ALT SERPL-CCNC: 14 U/L (ref 5–40)
ANION GAP SERPL CALCULATED.3IONS-SCNC: 9 MMOL/L (ref 3–15)
AST SERPL-CCNC: 17 U/L (ref 10–37)
BASOPHILS ABSOLUTE: 0 K/UL (ref 0–0.2)
BASOPHILS RELATIVE PERCENT: 1 % (ref 0–2)
BILIRUB SERPL-MCNC: 0.3 MG/DL (ref 0.2–1.2)
BILIRUBIN DIRECT: <0.2 MG/DL (ref 0–0.3)
BUN BLDV-MCNC: 23 MG/DL (ref 6–22)
CALCIUM SERPL-MCNC: 9.1 MG/DL (ref 8.4–10.5)
CHLORIDE BLD-SCNC: 100 MMOL/L (ref 98–110)
CO2: 29 MMOL/L (ref 20–32)
CREAT SERPL-MCNC: 1.5 MG/DL (ref 0.8–1.4)
EOSINOPHIL # BLD: 0 % (ref 0–6)
EOSINOPHILS ABSOLUTE: 0 K/UL (ref 0–0.5)
GFR, ESTIMATED: 36.4 ML/MIN/1.73 SQ.M.
GLOBULIN: 2.7 G/DL (ref 2–4)
GLUCOSE: 72 MG/DL (ref 70–99)
HCT VFR BLD CALC: 44 % (ref 35.1–48.3)
HEMOGLOBIN: 14 G/DL (ref 11.7–16.1)
LYMPHOCYTES # BLD: 51 % (ref 20–45)
LYMPHOCYTES ABSOLUTE: 2.7 K/UL (ref 1–4.8)
MAGNESIUM: 2.1 MG/DL (ref 1.6–2.5)
MCH RBC QN AUTO: 29 PG (ref 26–34)
MCHC RBC AUTO-ENTMCNC: 32 G/DL (ref 31–36)
MCV RBC AUTO: 90 FL (ref 80–99)
MONOCYTES ABSOLUTE: 0.4 K/UL (ref 0.1–1)
MONOCYTES: 8 % (ref 3–12)
NEUTROPHILS ABSOLUTE: 2.1 K/UL (ref 1.8–7.7)
NEUTROPHILS: 40 % (ref 40–75)
PDW BLD-RTO: 13.3 % (ref 10–15.5)
PLATELET # BLD: 221 K/UL (ref 140–440)
PMV BLD AUTO: 10.4 FL (ref 9–13)
POTASSIUM SERPL-SCNC: 4.6 MMOL/L (ref 3.5–5.5)
RAPAMUNE (SIROLIMUS): 4 NG/ML (ref 3–20)
RBC # BLD: 4.91 M/UL (ref 3.8–5.2)
SODIUM BLD-SCNC: 138 MMOL/L (ref 133–145)
TOTAL PROTEIN: 6.7 G/DL (ref 6.2–8.1)
WBC # BLD: 5.3 K/UL (ref 4–11)

## 2024-05-20 ENCOUNTER — TELEPHONE (OUTPATIENT)
Age: 69
End: 2024-05-20

## 2024-05-20 NOTE — TELEPHONE ENCOUNTER
Called Patient to review recent lab results from 5/17/24. Informed patient her lab results are all within normal limits and stable. Patient verbally confirmed understanding.

## 2024-06-21 LAB
A/G RATIO: 1.6 RATIO (ref 1.1–2.6)
ALBUMIN: 3.8 G/DL (ref 3.5–5)
ALP BLD-CCNC: 68 U/L (ref 40–120)
ALT SERPL-CCNC: 15 U/L (ref 5–40)
ANION GAP SERPL CALCULATED.3IONS-SCNC: 10 MMOL/L (ref 3–15)
AST SERPL-CCNC: 19 U/L (ref 10–37)
BASOPHILS ABSOLUTE: 0 K/UL (ref 0–0.2)
BASOPHILS RELATIVE PERCENT: 0 % (ref 0–2)
BILIRUB SERPL-MCNC: 0.2 MG/DL (ref 0.2–1.2)
BILIRUBIN DIRECT: <0.2 MG/DL (ref 0–0.3)
BUN BLDV-MCNC: 21 MG/DL (ref 6–22)
CALCIUM SERPL-MCNC: 9 MG/DL (ref 8.4–10.5)
CHLORIDE BLD-SCNC: 108 MMOL/L (ref 98–110)
CO2: 25 MMOL/L (ref 20–32)
CREAT SERPL-MCNC: 1.3 MG/DL (ref 0.8–1.4)
EOSINOPHIL # BLD: 1 % (ref 0–6)
EOSINOPHILS ABSOLUTE: 0.1 K/UL (ref 0–0.5)
GFR, ESTIMATED: 42.8 ML/MIN/1.73 SQ.M.
GLOBULIN: 2.4 G/DL (ref 2–4)
GLUCOSE: 75 MG/DL (ref 70–99)
HCT VFR BLD CALC: 42.4 % (ref 35.1–48.3)
HEMOGLOBIN: 13.3 G/DL (ref 11.7–16.1)
LYMPHOCYTES # BLD: 48 % (ref 20–45)
LYMPHOCYTES ABSOLUTE: 2.3 K/UL (ref 1–4.8)
MAGNESIUM: 2.4 MG/DL (ref 1.6–2.5)
MCH RBC QN AUTO: 28 PG (ref 26–34)
MCHC RBC AUTO-ENTMCNC: 31 G/DL (ref 31–36)
MCV RBC AUTO: 90 FL (ref 80–99)
MONOCYTES ABSOLUTE: 0.4 K/UL (ref 0.1–1)
MONOCYTES: 9 % (ref 3–12)
NEUTROPHILS ABSOLUTE: 1.9 K/UL (ref 1.8–7.7)
NEUTROPHILS: 41 % (ref 40–75)
PDW BLD-RTO: 13.5 % (ref 10–15.5)
PLATELET # BLD: 212 K/UL (ref 140–440)
PMV BLD AUTO: 10.4 FL (ref 9–13)
POTASSIUM SERPL-SCNC: 4.9 MMOL/L (ref 3.5–5.5)
RAPAMUNE (SIROLIMUS): 3.9 NG/ML (ref 3–20)
RBC # BLD: 4.73 M/UL (ref 3.8–5.2)
SODIUM BLD-SCNC: 143 MMOL/L (ref 133–145)
TOTAL PROTEIN: 6.2 G/DL (ref 6.2–8.1)
WBC # BLD: 4.7 K/UL (ref 4–11)

## 2024-07-20 LAB
A/G RATIO: 1.4 RATIO (ref 1.1–2.6)
ALBUMIN: 3.9 G/DL (ref 3.5–5)
ALP BLD-CCNC: 74 U/L (ref 40–120)
ALT SERPL-CCNC: 16 U/L (ref 5–40)
ANION GAP SERPL CALCULATED.3IONS-SCNC: 6 MMOL/L (ref 3–15)
AST SERPL-CCNC: 19 U/L (ref 10–37)
BASOPHILS ABSOLUTE: 0 K/UL (ref 0–0.2)
BASOPHILS RELATIVE PERCENT: 0 % (ref 0–2)
BILIRUB SERPL-MCNC: 0.3 MG/DL (ref 0.2–1.2)
BILIRUBIN DIRECT: <0.2 MG/DL (ref 0–0.3)
BUN BLDV-MCNC: 18 MG/DL (ref 6–22)
CALCIUM SERPL-MCNC: 9.2 MG/DL (ref 8.4–10.5)
CHLORIDE BLD-SCNC: 104 MMOL/L (ref 98–110)
CO2: 28 MMOL/L (ref 20–32)
CREAT SERPL-MCNC: 1.2 MG/DL (ref 0.8–1.4)
EOSINOPHIL # BLD: 1 % (ref 0–6)
EOSINOPHILS ABSOLUTE: 0.1 K/UL (ref 0–0.5)
GFR, ESTIMATED: 46.9 ML/MIN/1.73 SQ.M.
GLOBULIN: 2.7 G/DL (ref 2–4)
GLUCOSE: 89 MG/DL (ref 70–99)
HCT VFR BLD CALC: 43.1 % (ref 35.1–48.3)
HEMOGLOBIN: 13.3 G/DL (ref 11.7–16.1)
LYMPHOCYTES # BLD: 46 % (ref 20–45)
LYMPHOCYTES ABSOLUTE: 2.2 K/UL (ref 1–4.8)
MAGNESIUM: 2.3 MG/DL (ref 1.6–2.5)
MCH RBC QN AUTO: 28 PG (ref 26–34)
MCHC RBC AUTO-ENTMCNC: 31 G/DL (ref 31–36)
MCV RBC AUTO: 91 FL (ref 80–99)
MONOCYTES ABSOLUTE: 0.4 K/UL (ref 0.1–1)
MONOCYTES: 8 % (ref 3–12)
NEUTROPHILS ABSOLUTE: 2.2 K/UL (ref 1.8–7.7)
NEUTROPHILS: 45 % (ref 40–75)
PDW BLD-RTO: 13.2 % (ref 10–15.5)
PLATELET # BLD: 240 K/UL (ref 140–440)
PMV BLD AUTO: 11.1 FL (ref 9–13)
POTASSIUM SERPL-SCNC: 4.7 MMOL/L (ref 3.5–5.5)
RAPAMUNE (SIROLIMUS): 5 NG/ML (ref 3–20)
RBC # BLD: 4.74 M/UL (ref 3.8–5.2)
SODIUM BLD-SCNC: 138 MMOL/L (ref 133–145)
TOTAL PROTEIN: 6.6 G/DL (ref 6.2–8.1)
WBC # BLD: 4.8 K/UL (ref 4–11)

## 2024-07-22 ENCOUNTER — TELEPHONE (OUTPATIENT)
Age: 69
End: 2024-07-22

## 2024-07-25 ENCOUNTER — OFFICE VISIT (OUTPATIENT)
Age: 69
End: 2024-07-25
Payer: MEDICARE

## 2024-07-25 VITALS
WEIGHT: 166 LBS | HEIGHT: 66 IN | DIASTOLIC BLOOD PRESSURE: 57 MMHG | TEMPERATURE: 97.5 F | SYSTOLIC BLOOD PRESSURE: 97 MMHG | HEART RATE: 88 BPM | OXYGEN SATURATION: 98 % | BODY MASS INDEX: 26.68 KG/M2

## 2024-07-25 DIAGNOSIS — Z94.4 LIVER TRANSPLANT STATUS (HCC): Primary | ICD-10-CM

## 2024-07-25 PROCEDURE — G8428 CUR MEDS NOT DOCUMENT: HCPCS | Performed by: NURSE PRACTITIONER

## 2024-07-25 PROCEDURE — 1090F PRES/ABSN URINE INCON ASSESS: CPT | Performed by: NURSE PRACTITIONER

## 2024-07-25 PROCEDURE — 3017F COLORECTAL CA SCREEN DOC REV: CPT | Performed by: NURSE PRACTITIONER

## 2024-07-25 PROCEDURE — 1123F ACP DISCUSS/DSCN MKR DOCD: CPT | Performed by: NURSE PRACTITIONER

## 2024-07-25 PROCEDURE — 1036F TOBACCO NON-USER: CPT | Performed by: NURSE PRACTITIONER

## 2024-07-25 PROCEDURE — G8417 CALC BMI ABV UP PARAM F/U: HCPCS | Performed by: NURSE PRACTITIONER

## 2024-07-25 PROCEDURE — G8400 PT W/DXA NO RESULTS DOC: HCPCS | Performed by: NURSE PRACTITIONER

## 2024-07-25 PROCEDURE — 99214 OFFICE O/P EST MOD 30 MIN: CPT | Performed by: NURSE PRACTITIONER

## 2024-07-25 RX ORDER — METRONIDAZOLE 10 MG/G
GEL TOPICAL
COMMUNITY
Start: 2024-05-03

## 2024-07-25 RX ORDER — DOXYCYCLINE 50 MG/1
CAPSULE ORAL
COMMUNITY
Start: 2024-05-06 | End: 2024-07-25

## 2024-07-25 RX ORDER — LISINOPRIL 10 MG/1
TABLET ORAL
COMMUNITY
End: 2024-07-25

## 2024-07-25 NOTE — PROGRESS NOTES
[facility]      Jerod Unger MD, FACP, FACG, FAASLD      Lia Gu, PA-C    Sandy Shepherd, [facility]   Kassy Spivey, [facility]   Eloisa Gamaosta, [facility]-  Arnold Madrigal, [facility]   Kassidy Barber, [facility]   Susan Benjamin, [facility]   [address], [address]   [place]  [address] [phone]   FAX: [phone]  [facility]   [address], [address]   [place]  [address] [phone]   FAX: [phone]       Patient Care Team:  Jonathan Sharma MD as PCP - General (Family Medicine)  Tyesha Ma MD as Physician (Surgery Physician)  Claude Javier MD as Physician (Obstetrics & Gynecology)  Thomas Sena MD as Physician (Neurosurgery)  ASAF POPE DDS as Physician (Dental General Practice)  TABBY DONOHUE O.D. as Physician (Optometry)  Karthik Martinez MD (Gastroenterology)  Chad Whitney MD (Internal Medicine Physician)  Devang Fong MD (Hematology and Oncology)  Domo Hudson DO (Rheumatology Internal Medicine)  Claude Masters MD (Dermatology Physician)  Jomar Claudio MD (Endocrinology Physician)  Navi Moran DPM (Podiatry)  Essie Parra, ELSA as Nurse Navigator (Hepatology)      Patient Active Problem List   Diagnosis    H/O liver transplant (HCC)    Diabetes mellitus (HCC)    Complication of transplanted liver (HCC)    Breast cancer (HCC)    H/O shoulder surgery    Long term current use of immunosuppressive drug    Colon polyps    Back pain, lumbosacral       Cherise L Langevin returns to the [facility] for management of liver allograft function, to adjust immune suppression.  Fibroscan  assessment of hepatic fibrosis was also performed during today's visit.      The active problem list, all

## 2024-08-20 NOTE — TELEPHONE ENCOUNTER
LVM on patients phone informing patient her recent lab results from 11/10/22 are all stable and within normal limits. Requested she give me a call if she has any questions or concerns.
No

## 2024-08-29 LAB
A/G RATIO: 1.6 RATIO (ref 1.1–2.6)
ALBUMIN: 3.9 G/DL (ref 3.5–5)
ALP BLD-CCNC: 72 U/L (ref 40–120)
ALT SERPL-CCNC: 13 U/L (ref 5–40)
AST SERPL-CCNC: 22 U/L (ref 10–37)
BASOPHILS ABSOLUTE: 0 K/UL (ref 0–0.2)
BASOPHILS RELATIVE PERCENT: 0 % (ref 0–2)
BILIRUB SERPL-MCNC: 0.3 MG/DL (ref 0.2–1.2)
BILIRUBIN DIRECT: <0.2 MG/DL (ref 0–0.3)
EOSINOPHIL # BLD: 1 % (ref 0–6)
EOSINOPHILS ABSOLUTE: 0 K/UL (ref 0–0.5)
GLOBULIN: 2.5 G/DL (ref 2–4)
HCT VFR BLD CALC: 43.4 % (ref 35.1–48.3)
HEMOGLOBIN: 13.8 G/DL (ref 11.7–16.1)
LYMPHOCYTES # BLD: 49 % (ref 20–45)
LYMPHOCYTES ABSOLUTE: 2.4 K/UL (ref 1–4.8)
MAGNESIUM: 2.1 MG/DL (ref 1.6–2.5)
MCH RBC QN AUTO: 29 PG (ref 26–34)
MCHC RBC AUTO-ENTMCNC: 32 G/DL (ref 31–36)
MCV RBC AUTO: 90 FL (ref 80–99)
MONOCYTES ABSOLUTE: 0.3 K/UL (ref 0.1–1)
MONOCYTES: 7 % (ref 3–12)
NEUTROPHILS ABSOLUTE: 2.1 K/UL (ref 1.8–7.7)
NEUTROPHILS: 44 % (ref 40–75)
PDW BLD-RTO: 13.1 % (ref 10–15.5)
PLATELET # BLD: 228 K/UL (ref 140–440)
PMV BLD AUTO: 10.5 FL (ref 9–13)
RAPAMUNE (SIROLIMUS): 5 NG/ML (ref 3–20)
RBC # BLD: 4.84 M/UL (ref 3.8–5.2)
TOTAL PROTEIN: 6.4 G/DL (ref 6.2–8.1)
WBC # BLD: 4.9 K/UL (ref 4–11)

## 2024-09-16 DIAGNOSIS — Z79.899 LONG TERM CURRENT USE OF IMMUNOSUPPRESSIVE DRUG: ICD-10-CM

## 2024-09-16 DIAGNOSIS — Z94.4 H/O LIVER TRANSPLANT (HCC): Primary | ICD-10-CM

## 2024-09-16 RX ORDER — MYCOPHENOLATE MOFETIL 500 MG/1
1000 TABLET ORAL 2 TIMES DAILY
Qty: 360 TABLET | Refills: 3 | Status: SHIPPED | OUTPATIENT
Start: 2024-09-16 | End: 2025-09-16

## 2024-09-23 LAB
A/G RATIO: 1.5 RATIO (ref 1.1–2.6)
ALBUMIN: 4 G/DL (ref 3.5–5)
ALP BLD-CCNC: 76 U/L (ref 40–120)
ALT SERPL-CCNC: 16 U/L (ref 5–40)
ANION GAP SERPL CALCULATED.3IONS-SCNC: 11 MMOL/L (ref 3–15)
AST SERPL-CCNC: 19 U/L (ref 10–37)
BASOPHILS ABSOLUTE: 0 K/UL (ref 0–0.2)
BASOPHILS RELATIVE PERCENT: 0 % (ref 0–2)
BILIRUB SERPL-MCNC: 0.3 MG/DL (ref 0.2–1.2)
BILIRUBIN DIRECT: <0.2 MG/DL (ref 0–0.3)
BUN BLDV-MCNC: 19 MG/DL (ref 6–22)
CALCIUM SERPL-MCNC: 9.6 MG/DL (ref 8.4–10.5)
CHLORIDE BLD-SCNC: 103 MMOL/L (ref 98–110)
CO2: 26 MMOL/L (ref 20–32)
CREAT SERPL-MCNC: 1.3 MG/DL (ref 0.8–1.4)
EOSINOPHIL # BLD: 1 % (ref 0–6)
EOSINOPHILS ABSOLUTE: 0.1 K/UL (ref 0–0.5)
GFR, ESTIMATED: 42.8 ML/MIN/1.73 SQ.M.
GLOBULIN: 2.6 G/DL (ref 2–4)
GLUCOSE: 82 MG/DL (ref 70–99)
HCT VFR BLD CALC: 42.6 % (ref 35.1–48.3)
HEMOGLOBIN: 13.4 G/DL (ref 11.7–16.1)
LYMPHOCYTES # BLD: 52 % (ref 20–45)
LYMPHOCYTES ABSOLUTE: 2.6 K/UL (ref 1–4.8)
MAGNESIUM: 2.5 MG/DL (ref 1.6–2.5)
MCH RBC QN AUTO: 28 PG (ref 26–34)
MCHC RBC AUTO-ENTMCNC: 32 G/DL (ref 31–36)
MCV RBC AUTO: 90 FL (ref 80–99)
MONOCYTES ABSOLUTE: 0.4 K/UL (ref 0.1–1)
MONOCYTES: 7 % (ref 3–12)
NEUTROPHILS ABSOLUTE: 2 K/UL (ref 1.8–7.7)
NEUTROPHILS: 39 % (ref 40–75)
PDW BLD-RTO: 13.2 % (ref 10–15.5)
PLATELET # BLD: 236 K/UL (ref 140–440)
PMV BLD AUTO: 10.3 FL (ref 9–13)
POTASSIUM SERPL-SCNC: 5.2 MMOL/L (ref 3.5–5.5)
RAPAMUNE (SIROLIMUS): 4.1 NG/ML (ref 3–20)
RBC # BLD: 4.74 M/UL (ref 3.8–5.2)
SODIUM BLD-SCNC: 140 MMOL/L (ref 133–145)
TOTAL PROTEIN: 6.6 G/DL (ref 6.2–8.1)
WBC # BLD: 5.1 K/UL (ref 4–11)

## 2024-09-24 ENCOUNTER — TELEPHONE (OUTPATIENT)
Age: 69
End: 2024-09-24

## 2024-10-07 RX ORDER — FUROSEMIDE 40 MG
TABLET ORAL
Qty: 90 TABLET | Refills: 0 | Status: SHIPPED | OUTPATIENT
Start: 2024-10-07

## 2024-10-19 LAB
A/G RATIO: 1.4 RATIO (ref 1.1–2.6)
ALBUMIN: 3.7 G/DL (ref 3.5–5)
ALP BLD-CCNC: 65 U/L (ref 40–120)
ALT SERPL-CCNC: 16 U/L (ref 5–40)
ANION GAP SERPL CALCULATED.3IONS-SCNC: 8 MMOL/L (ref 3–15)
AST SERPL-CCNC: 21 U/L (ref 10–37)
BASOPHILS ABSOLUTE: 0 K/UL (ref 0–0.2)
BASOPHILS RELATIVE PERCENT: 1 % (ref 0–2)
BILIRUB SERPL-MCNC: 0.3 MG/DL (ref 0.2–1.2)
BILIRUBIN DIRECT: <0.2 MG/DL (ref 0–0.3)
BUN BLDV-MCNC: 21 MG/DL (ref 6–22)
CALCIUM SERPL-MCNC: 9.3 MG/DL (ref 8.4–10.5)
CHLORIDE BLD-SCNC: 103 MMOL/L (ref 98–110)
CO2: 28 MMOL/L (ref 20–32)
CREAT SERPL-MCNC: 1.1 MG/DL (ref 0.8–1.4)
EOSINOPHIL # BLD: 1 % (ref 0–6)
EOSINOPHILS ABSOLUTE: 0.1 K/UL (ref 0–0.5)
GFR, ESTIMATED: 51.4 ML/MIN/1.73 SQ.M.
GLOBULIN: 2.7 G/DL (ref 2–4)
GLUCOSE: 92 MG/DL (ref 70–99)
HCT VFR BLD CALC: 40.8 % (ref 35.1–48.3)
HEMOGLOBIN: 12.9 G/DL (ref 11.7–16.1)
LYMPHOCYTES # BLD: 50 % (ref 20–45)
LYMPHOCYTES ABSOLUTE: 2.2 K/UL (ref 1–4.8)
MAGNESIUM: 2.3 MG/DL (ref 1.6–2.5)
MCH RBC QN AUTO: 28 PG (ref 26–34)
MCHC RBC AUTO-ENTMCNC: 32 G/DL (ref 31–36)
MCV RBC AUTO: 90 FL (ref 80–99)
MONOCYTES ABSOLUTE: 0.3 K/UL (ref 0.1–1)
MONOCYTES: 8 % (ref 3–12)
NEUTROPHILS ABSOLUTE: 1.8 K/UL (ref 1.8–7.7)
NEUTROPHILS: 41 % (ref 40–75)
PDW BLD-RTO: 13.2 % (ref 10–15.5)
PLATELET # BLD: 223 K/UL (ref 140–440)
PMV BLD AUTO: 10.1 FL (ref 9–13)
POTASSIUM SERPL-SCNC: 4.8 MMOL/L (ref 3.5–5.5)
RAPAMUNE (SIROLIMUS): 3.7 NG/ML (ref 3–20)
RBC # BLD: 4.54 M/UL (ref 3.8–5.2)
SODIUM BLD-SCNC: 139 MMOL/L (ref 133–145)
TOTAL PROTEIN: 6.4 G/DL (ref 6.2–8.1)
WBC # BLD: 4.4 K/UL (ref 4–11)

## 2024-10-28 ENCOUNTER — OFFICE VISIT (OUTPATIENT)
Age: 69
End: 2024-10-28
Payer: MEDICARE

## 2024-10-28 VITALS
HEART RATE: 85 BPM | TEMPERATURE: 97.9 F | OXYGEN SATURATION: 98 % | BODY MASS INDEX: 26.36 KG/M2 | SYSTOLIC BLOOD PRESSURE: 121 MMHG | RESPIRATION RATE: 14 BRPM | WEIGHT: 164 LBS | DIASTOLIC BLOOD PRESSURE: 51 MMHG | HEIGHT: 66 IN

## 2024-10-28 DIAGNOSIS — Z94.4 LIVER TRANSPLANT STATUS (HCC): Primary | ICD-10-CM

## 2024-10-28 PROCEDURE — 99214 OFFICE O/P EST MOD 30 MIN: CPT | Performed by: NURSE PRACTITIONER

## 2024-10-28 PROCEDURE — 1090F PRES/ABSN URINE INCON ASSESS: CPT | Performed by: NURSE PRACTITIONER

## 2024-10-28 PROCEDURE — G8400 PT W/DXA NO RESULTS DOC: HCPCS | Performed by: NURSE PRACTITIONER

## 2024-10-28 PROCEDURE — G8484 FLU IMMUNIZE NO ADMIN: HCPCS | Performed by: NURSE PRACTITIONER

## 2024-10-28 PROCEDURE — G8417 CALC BMI ABV UP PARAM F/U: HCPCS | Performed by: NURSE PRACTITIONER

## 2024-10-28 PROCEDURE — 1123F ACP DISCUSS/DSCN MKR DOCD: CPT | Performed by: NURSE PRACTITIONER

## 2024-10-28 PROCEDURE — G8427 DOCREV CUR MEDS BY ELIG CLIN: HCPCS | Performed by: NURSE PRACTITIONER

## 2024-10-28 PROCEDURE — 1036F TOBACCO NON-USER: CPT | Performed by: NURSE PRACTITIONER

## 2024-10-28 PROCEDURE — 1159F MED LIST DOCD IN RCRD: CPT | Performed by: NURSE PRACTITIONER

## 2024-10-28 PROCEDURE — 3017F COLORECTAL CA SCREEN DOC REV: CPT | Performed by: NURSE PRACTITIONER

## 2024-10-28 RX ORDER — LISINOPRIL 10 MG/1
TABLET ORAL
COMMUNITY
End: 2024-10-28

## 2024-10-28 RX ORDER — INSULIN GLARGINE 100 [IU]/ML
16 INJECTION, SOLUTION SUBCUTANEOUS EVERY 24 HOURS
COMMUNITY
Start: 2024-09-06

## 2024-10-28 NOTE — PROGRESS NOTES
[facility]     Jerod Unger MD, FACP, MACG, FAASLD   MD Lia Hunt, PA-MARYANNE Shepherd, [facility]-BC   Kassy Spivey, [facility]-   Eloisa Leyva, [facility]-  Arnold Madrigal, [facility]   Kassidy Barber, [facility]   Susan Benjamin, [facility]   at [facility]   [address], [address]   [place]  [address] [phone]   FAX: [phone]  [facility]   [address]   [place]  [address] [phone]   FAX: [phone]       Patient Care Team:  Jonathan Sharma MD as PCP - General (Family Medicine)  Tyesha Ma MD as Physician (Surgery Physician)  Claude Javier MD as Physician (Obstetrics & Gynecology)  Thomas Sena MD as Physician (Neurosurgery)  ASAF POPE DDS as Physician (Dental General Practice)  TABBY DONOHUE O.D. as Physician (Optometry)  Karthik Martinez MD (Gastroenterology)  Chad Whitney MD (Internal Medicine Physician)  Devang Fong MD (Hematology and Oncology)  Domo Hudson DO (Rheumatology Internal Medicine)  Claude Masters MD (Dermatology Physician)  Jomar Claudio MD (Endocrinology Physician)  Navi Moran DPM (Podiatry)  Essie Parra, ELSA as Nurse Navigator (Hepatology)      Patient Active Problem List   Diagnosis    H/O liver transplant (HCC)    Diabetes mellitus (HCC)    Complication of transplanted liver (HCC)    Breast cancer (HCC)    H/O shoulder surgery    Long term current use of immunosuppressive drug    Colon polyps    Back pain, lumbosacral       Cherise L Mikein returns to the [facility] for management of liver allograft function, to adjust immune suppression.     The active problem list, all pertinent past medical history, medications, liver histology,

## 2024-11-26 LAB
A/G RATIO: 1.4 RATIO (ref 1.1–2.6)
ALBUMIN: 3.9 G/DL (ref 3.5–5)
ALP BLD-CCNC: 75 U/L (ref 40–120)
ALT SERPL-CCNC: 12 U/L (ref 5–40)
ANION GAP SERPL CALCULATED.3IONS-SCNC: 9 MMOL/L (ref 3–15)
AST SERPL-CCNC: 18 U/L (ref 10–37)
BASOPHILS ABSOLUTE: 0 K/UL (ref 0–0.2)
BASOPHILS RELATIVE PERCENT: 1 % (ref 0–2)
BILIRUB SERPL-MCNC: 0.3 MG/DL (ref 0.2–1.2)
BILIRUBIN DIRECT: <0.2 MG/DL (ref 0–0.3)
BUN BLDV-MCNC: 19 MG/DL (ref 6–22)
CALCIUM SERPL-MCNC: 9.4 MG/DL (ref 8.4–10.5)
CHLORIDE BLD-SCNC: 102 MMOL/L (ref 98–110)
CO2: 27 MMOL/L (ref 20–32)
CREAT SERPL-MCNC: 1.1 MG/DL (ref 0.8–1.4)
EOSINOPHIL # BLD: 0 % (ref 0–6)
EOSINOPHILS ABSOLUTE: 0 K/UL (ref 0–0.5)
GFR, ESTIMATED: 51.4 ML/MIN/1.73 SQ.M.
GLOBULIN: 2.8 G/DL (ref 2–4)
GLUCOSE: 83 MG/DL (ref 70–99)
HCT VFR BLD CALC: 40.7 % (ref 35.1–48.3)
HEMOGLOBIN: 13 G/DL (ref 11.7–16.1)
LYMPHOCYTES # BLD: 48 % (ref 20–45)
LYMPHOCYTES ABSOLUTE: 2.4 K/UL (ref 1–4.8)
MAGNESIUM: 2.3 MG/DL (ref 1.6–2.5)
MCH RBC QN AUTO: 28 PG (ref 26–34)
MCHC RBC AUTO-ENTMCNC: 32 G/DL (ref 31–36)
MCV RBC AUTO: 88 FL (ref 80–99)
MONOCYTES ABSOLUTE: 0.3 K/UL (ref 0.1–1)
MONOCYTES: 7 % (ref 3–12)
NEUTROPHILS ABSOLUTE: 2.3 K/UL (ref 1.8–7.7)
NEUTROPHILS: 45 % (ref 40–75)
PDW BLD-RTO: 13.1 % (ref 10–15.5)
PLATELET # BLD: 267 K/UL (ref 140–440)
PMV BLD AUTO: 10.1 FL (ref 9–13)
POTASSIUM SERPL-SCNC: 4.4 MMOL/L (ref 3.5–5.5)
RAPAMUNE (SIROLIMUS): 4.7 NG/ML (ref 3–20)
RBC # BLD: 4.62 M/UL (ref 3.8–5.2)
SODIUM BLD-SCNC: 138 MMOL/L (ref 133–145)
TOTAL PROTEIN: 6.7 G/DL (ref 6.2–8.1)
WBC # BLD: 5.1 K/UL (ref 4–11)

## 2024-11-27 ENCOUNTER — TELEPHONE (OUTPATIENT)
Age: 69
End: 2024-11-27

## 2024-11-27 DIAGNOSIS — Z94.4 H/O LIVER TRANSPLANT (HCC): ICD-10-CM

## 2024-11-27 DIAGNOSIS — Z79.899 LONG TERM CURRENT USE OF IMMUNOSUPPRESSIVE DRUG: Primary | ICD-10-CM

## 2024-11-27 NOTE — TELEPHONE ENCOUNTER
Called patient to review lab results for 11/25/24. Informed patient lab results are within normal limits and stable. Patient requested new lab orders for 18 months be sent to [facility] and mailed a copy to her home. Patient verbally confirmed understanding.

## 2024-12-04 RX ORDER — FUROSEMIDE 40 MG/1
TABLET ORAL
Qty: 90 TABLET | Refills: 0 | Status: SHIPPED | OUTPATIENT
Start: 2024-12-04

## 2024-12-27 LAB
A/G RATIO: 1.5 RATIO (ref 1.1–2.6)
ALBUMIN: 3.8 G/DL (ref 3.5–5)
ALP BLD-CCNC: 77 U/L (ref 40–120)
ALT SERPL-CCNC: 12 U/L (ref 5–40)
ANION GAP SERPL CALCULATED.3IONS-SCNC: 12 MMOL/L (ref 3–15)
AST SERPL-CCNC: 20 U/L (ref 10–37)
BASOPHILS ABSOLUTE: 0 K/UL (ref 0–0.2)
BASOPHILS RELATIVE PERCENT: 0 % (ref 0–2)
BILIRUB SERPL-MCNC: 0.3 MG/DL (ref 0.2–1.2)
BILIRUBIN DIRECT: <0.2 MG/DL (ref 0–0.3)
BUN BLDV-MCNC: 23 MG/DL (ref 6–22)
CALCIUM SERPL-MCNC: 9.2 MG/DL (ref 8.4–10.5)
CHLORIDE BLD-SCNC: 105 MMOL/L (ref 98–110)
CO2: 25 MMOL/L (ref 20–32)
CREAT SERPL-MCNC: 1.3 MG/DL (ref 0.8–1.4)
EOSINOPHIL # BLD: 1 % (ref 0–6)
EOSINOPHILS ABSOLUTE: 0 K/UL (ref 0–0.5)
GFR, ESTIMATED: 42.5 ML/MIN/1.73 SQ.M.
GLOBULIN: 2.6 G/DL (ref 2–4)
GLUCOSE: 92 MG/DL (ref 70–99)
HCT VFR BLD CALC: 41.5 % (ref 35.1–48.3)
HEMOGLOBIN: 13.3 G/DL (ref 11.7–16.1)
LYMPHOCYTES # BLD: 48 % (ref 20–45)
LYMPHOCYTES ABSOLUTE: 2.2 K/UL (ref 1–4.8)
MAGNESIUM: 2.3 MG/DL (ref 1.6–2.5)
MCH RBC QN AUTO: 28 PG (ref 26–34)
MCHC RBC AUTO-ENTMCNC: 32 G/DL (ref 31–36)
MCV RBC AUTO: 88 FL (ref 80–99)
MONOCYTES ABSOLUTE: 0.4 K/UL (ref 0.1–1)
MONOCYTES: 8 % (ref 3–12)
NEUTROPHILS ABSOLUTE: 2 K/UL (ref 1.8–7.7)
NEUTROPHILS: 42 % (ref 40–75)
PDW BLD-RTO: 12.7 % (ref 10–15.5)
PLATELET # BLD: 238 K/UL (ref 140–440)
PMV BLD AUTO: 9.9 FL (ref 9–13)
POTASSIUM SERPL-SCNC: 4.6 MMOL/L (ref 3.5–5.5)
RAPAMUNE (SIROLIMUS): 5.1 NG/ML (ref 3–20)
RBC # BLD: 4.7 M/UL (ref 3.8–5.2)
SODIUM BLD-SCNC: 142 MMOL/L (ref 133–145)
TOTAL PROTEIN: 6.4 G/DL (ref 6.2–8.1)
WBC # BLD: 4.6 K/UL (ref 4–11)

## 2024-12-30 ENCOUNTER — TELEPHONE (OUTPATIENT)
Age: 69
End: 2024-12-30

## 2024-12-30 DIAGNOSIS — Z94.4 H/O LIVER TRANSPLANT (HCC): Primary | ICD-10-CM

## 2024-12-30 DIAGNOSIS — Z79.899 LONG TERM CURRENT USE OF IMMUNOSUPPRESSIVE DRUG: ICD-10-CM

## 2024-12-30 NOTE — TELEPHONE ENCOUNTER
Called patient to review recent lab results from 12/26/24. Informed patient all labs are within normal limits. Next appointment with Pepe Madrigal NP, is 1/30/25. Patient verbally confirmed understanding.

## 2025-01-24 LAB
A/G RATIO: 1.5 RATIO (ref 1.1–2.6)
ALBUMIN: 3.8 G/DL (ref 3.5–5)
ALP BLD-CCNC: 71 U/L (ref 40–120)
ALT SERPL-CCNC: 17 U/L (ref 5–40)
ANION GAP SERPL CALCULATED.3IONS-SCNC: 9 MMOL/L (ref 3–15)
AST SERPL-CCNC: 19 U/L (ref 10–37)
BASOPHILS # BLD: 1 % (ref 0–2)
BASOPHILS ABSOLUTE: 0 K/UL (ref 0–0.2)
BILIRUB SERPL-MCNC: 0.2 MG/DL (ref 0.2–1.2)
BILIRUBIN DIRECT: <0.2 MG/DL (ref 0–0.3)
BUN BLDV-MCNC: 17 MG/DL (ref 6–22)
CALCIUM SERPL-MCNC: 9 MG/DL (ref 8.4–10.5)
CHLORIDE BLD-SCNC: 102 MMOL/L (ref 98–110)
CO2: 25 MMOL/L (ref 20–32)
CREAT SERPL-MCNC: 1.1 MG/DL (ref 0.8–1.4)
EOSINOPHIL # BLD: 1 % (ref 0–6)
EOSINOPHILS ABSOLUTE: 0 K/UL (ref 0–0.5)
GFR, ESTIMATED: 51.4 ML/MIN/1.73 SQ.M.
GLOBULIN: 2.6 G/DL (ref 2–4)
GLUCOSE: 96 MG/DL (ref 70–99)
HCT VFR BLD CALC: 42.5 % (ref 35.1–48.3)
HEMOGLOBIN: 13.4 G/DL (ref 11.7–16.1)
LYMPHOCYTES # BLD: 50 % (ref 20–45)
LYMPHOCYTES ABSOLUTE: 2.1 K/UL (ref 1–4.8)
MAGNESIUM: 2.2 MG/DL (ref 1.6–2.5)
MCH RBC QN AUTO: 28 PG (ref 26–34)
MCHC RBC AUTO-ENTMCNC: 32 G/DL (ref 31–36)
MCV RBC AUTO: 90 FL (ref 80–99)
MONOCYTES ABSOLUTE: 0.3 K/UL (ref 0.1–1)
MONOCYTES: 7 % (ref 3–12)
NEUTROPHILS ABSOLUTE: 1.8 K/UL (ref 1.8–7.7)
NEUTROPHILS SEGMENTED: 42 % (ref 40–75)
PDW BLD-RTO: 12.8 % (ref 10–15.5)
PLATELET # BLD: 236 K/UL (ref 140–440)
PMV BLD AUTO: 10.1 FL (ref 9–13)
POTASSIUM SERPL-SCNC: 5 MMOL/L (ref 3.5–5.5)
RBC # BLD: 4.73 M/UL (ref 3.8–5.2)
SODIUM BLD-SCNC: 136 MMOL/L (ref 133–145)
TOTAL PROTEIN: 6.4 G/DL (ref 6.2–8.1)
WBC # BLD: 4.1 K/UL (ref 4–11)

## 2025-01-25 LAB — SIROLIMUS LEVEL BLOOD: 5.3 NG/ML (ref 3–20)

## 2025-01-30 ENCOUNTER — OFFICE VISIT (OUTPATIENT)
Age: 70
End: 2025-01-30

## 2025-01-30 VITALS
WEIGHT: 161 LBS | TEMPERATURE: 97.5 F | BODY MASS INDEX: 25.88 KG/M2 | HEART RATE: 83 BPM | OXYGEN SATURATION: 98 % | HEIGHT: 66 IN | DIASTOLIC BLOOD PRESSURE: 58 MMHG | SYSTOLIC BLOOD PRESSURE: 138 MMHG

## 2025-01-30 DIAGNOSIS — Z94.4 LIVER TRANSPLANT STATUS (HCC): Primary | ICD-10-CM

## 2025-01-30 NOTE — PROGRESS NOTES
Cl 98 - 110 mmol/L 103  102  105  102    CO2 20 - 32 mmol/L 28  27  25  25    Glucose 70 - 99 mg/dL 92  83  92  96    Magnesium 1.6 - 2.5 mg/dL 2.3  2.3  2.3  2.2        Latest Ref Rng 9/20/2024 10/18/2024 11/25/2024 12/26/2024 1/24/2025   UMA - Transplant Immune Suppression         Sirolimus 3.0 - 20.0 ng/mL     5.3    Sirolimus Level 3.0 - 20.0 ng/mL 4.1  3.7  4.7  5.1       SEROLOGIES:  2/2012.  HAV total negative, HBsAntigen negative, anti-HBcore negative, anti-HBsurface negative, anti-HCV negative, Ferritin 20, DIONICIO negative, ASMA negative, AMA negative, ceruloplsmin 34, alpha-1-antitrypsin 195, A1AT phenotype MM.  2/2013.  Ferritin 434, ASMA weak positive, CMV IgG positive, EBV IgG positive, anti-HIV negative, HBA1C 5.1    LIVER HISTOLOGY:  11/2017.  Slides reviewed by MLS.  Acute graft rejection.    6/2018.  Slides reviewed by MLS.  NAFL.  40% macro and micovesicular steatosis.  No ballooning.  No inflammation.  No fibrosis.  No rejection.    05/2022.  FibroScan performed at [facility]. EkPa was 5.9.  IQR/med 22%.  .   The results suggested a fibrosis level of F0.  The CAP score suggests there is hepatic steatosis.      04/2024.  FibroScan performed at [facility]. EkPa was 3.7.  IQR/med 18%.  .   The results suggested a fibrosis level of F0.  The CAP score suggests there is hepatic steatosis.      ENDOSCOPIC PROCEDURES:  1/2012.  EGD by Dr Morton.  Esophageal varices.    RADIOLOGY:  1/2012.  CT scan abdomen with and without IV contrast.  Changes consistent with cirrhosis.  No liver mass lesions.  No dilated bile ducts.  No bile duct strictures.  Varices.  Generalized ascites.  07/2012:  Ultrasound of the liver.  Echogenic consistent with chronic liver disease, cirrhosis.  No liver mass lesions.  No ascites  11/2012: Ultrasound of the liver.  Echogenic consistent with chronic liver disease, cirrhosis.  No liver mass lesions.  Small amount ascites

## 2025-02-27 LAB
A/G RATIO: 1.7 RATIO (ref 1.1–2.6)
ALBUMIN: 4.1 G/DL (ref 3.5–5)
ALP BLD-CCNC: 68 U/L (ref 40–120)
ALT SERPL-CCNC: 15 U/L (ref 5–40)
ANION GAP SERPL CALCULATED.3IONS-SCNC: 8 MMOL/L (ref 3–15)
AST SERPL-CCNC: 19 U/L (ref 10–37)
BASOPHILS # BLD: 0 % (ref 0–2)
BASOPHILS ABSOLUTE: 0 K/UL (ref 0–0.2)
BILIRUB SERPL-MCNC: 0.3 MG/DL (ref 0.2–1.2)
BILIRUBIN DIRECT: <0.2 MG/DL (ref 0–0.3)
BUN BLDV-MCNC: 23 MG/DL (ref 6–22)
CALCIUM SERPL-MCNC: 9 MG/DL (ref 8.4–10.5)
CHLORIDE BLD-SCNC: 101 MMOL/L (ref 98–110)
CO2: 27 MMOL/L (ref 20–32)
CREAT SERPL-MCNC: 1.2 MG/DL (ref 0.8–1.4)
EOSINOPHIL # BLD: 1 % (ref 0–6)
EOSINOPHILS ABSOLUTE: 0 K/UL (ref 0–0.5)
GFR, ESTIMATED: 46.6 ML/MIN/1.73 SQ.M.
GLOBULIN: 2.4 G/DL (ref 2–4)
GLUCOSE: 95 MG/DL (ref 70–99)
HCT VFR BLD CALC: 44 % (ref 35.1–48.3)
HEMOGLOBIN: 14.2 G/DL (ref 11.7–16.1)
LYMPHOCYTES # BLD: 40 % (ref 20–45)
LYMPHOCYTES ABSOLUTE: 2.5 K/UL (ref 1–4.8)
MAGNESIUM: 2.2 MG/DL (ref 1.6–2.5)
MCH RBC QN AUTO: 29 PG (ref 26–34)
MCHC RBC AUTO-ENTMCNC: 32 G/DL (ref 31–36)
MCV RBC AUTO: 91 FL (ref 80–99)
MONOCYTES ABSOLUTE: 0.4 K/UL (ref 0.1–1)
MONOCYTES: 6 % (ref 3–12)
NEUTROPHILS ABSOLUTE: 3.2 K/UL (ref 1.8–7.7)
NEUTROPHILS SEGMENTED: 52 % (ref 40–75)
PDW BLD-RTO: 12.8 % (ref 10–15.5)
PLATELET # BLD: 232 K/UL (ref 140–440)
PMV BLD AUTO: 10.3 FL (ref 9–13)
POTASSIUM SERPL-SCNC: 4.3 MMOL/L (ref 3.5–5.5)
RBC # BLD: 4.85 M/UL (ref 3.8–5.2)
SODIUM BLD-SCNC: 136 MMOL/L (ref 133–145)
TOTAL PROTEIN: 6.5 G/DL (ref 6.2–8.1)
WBC # BLD: 6.1 K/UL (ref 4–11)

## 2025-02-28 LAB — SIROLIMUS LEVEL BLOOD: 2.5 NG/ML (ref 3–20)

## 2025-03-03 ENCOUNTER — TELEPHONE (OUTPATIENT)
Age: 70
End: 2025-03-03

## 2025-03-03 NOTE — TELEPHONE ENCOUNTER
Called patient to review recent lab results from 2/26/25. Informed patient all lab results within normal limits. Patient verbally confirmed understanding.

## 2025-03-20 DIAGNOSIS — Z94.4 LIVER TRANSPLANT STATUS: Primary | ICD-10-CM

## 2025-03-20 RX ORDER — ERYTHROMYCIN 5 MG/G
OINTMENT OPHTHALMIC
COMMUNITY
Start: 2025-02-11

## 2025-03-20 RX ORDER — FUROSEMIDE 40 MG/1
40 TABLET ORAL DAILY
Qty: 90 TABLET | Refills: 0 | Status: SHIPPED | OUTPATIENT
Start: 2025-03-20

## 2025-03-21 LAB
A/G RATIO: 1.4 RATIO (ref 1.1–2.6)
ALBUMIN: 3.8 G/DL (ref 3.5–5)
ALP BLD-CCNC: 75 U/L (ref 40–120)
ALT SERPL-CCNC: 16 U/L (ref 5–40)
ANION GAP SERPL CALCULATED.3IONS-SCNC: 16 MMOL/L (ref 3–15)
AST SERPL-CCNC: 21 U/L (ref 10–37)
BASOPHILS # BLD: 1 % (ref 0–2)
BASOPHILS ABSOLUTE: 0 K/UL (ref 0–0.2)
BILIRUB SERPL-MCNC: 0.2 MG/DL (ref 0.2–1.2)
BILIRUBIN DIRECT: <0.2 MG/DL (ref 0–0.3)
BUN BLDV-MCNC: 19 MG/DL (ref 6–22)
CALCIUM SERPL-MCNC: 9 MG/DL (ref 8.4–10.5)
CHLORIDE BLD-SCNC: 100 MMOL/L (ref 98–110)
CO2: 24 MMOL/L (ref 20–32)
CREAT SERPL-MCNC: 1.4 MG/DL (ref 0.8–1.4)
EOSINOPHIL # BLD: 1 % (ref 0–6)
EOSINOPHILS ABSOLUTE: 0 K/UL (ref 0–0.5)
GFR, ESTIMATED: 39.1 ML/MIN/1.73 SQ.M.
GLOBULIN: 2.7 G/DL (ref 2–4)
GLUCOSE: 119 MG/DL (ref 70–99)
HCT VFR BLD CALC: 42.9 % (ref 35.1–48.3)
HEMOGLOBIN: 13.7 G/DL (ref 11.7–16.1)
LYMPHOCYTES # BLD: 49 % (ref 20–45)
LYMPHOCYTES ABSOLUTE: 2.1 K/UL (ref 1–4.8)
MAGNESIUM: 2.4 MG/DL (ref 1.6–2.5)
MCH RBC QN AUTO: 29 PG (ref 26–34)
MCHC RBC AUTO-ENTMCNC: 32 G/DL (ref 31–36)
MCV RBC AUTO: 91 FL (ref 80–99)
MONOCYTES ABSOLUTE: 0.4 K/UL (ref 0.1–1)
MONOCYTES: 10 % (ref 3–12)
NEUTROPHILS ABSOLUTE: 1.7 K/UL (ref 1.8–7.7)
NEUTROPHILS SEGMENTED: 40 % (ref 40–75)
PDW BLD-RTO: 12.6 % (ref 10–15.5)
PLATELET # BLD: 230 K/UL (ref 140–440)
PMV BLD AUTO: 10.2 FL (ref 9–13)
POTASSIUM SERPL-SCNC: 4.9 MMOL/L (ref 3.5–5.5)
RBC # BLD: 4.73 M/UL (ref 3.8–5.2)
SODIUM BLD-SCNC: 140 MMOL/L (ref 133–145)
TOTAL PROTEIN: 6.5 G/DL (ref 6.2–8.1)
WBC # BLD: 4.3 K/UL (ref 4–11)

## 2025-04-15 LAB
A/G RATIO: 1.5 RATIO (ref 1.1–2.6)
ALBUMIN: 3.8 G/DL (ref 3.5–5)
ALP BLD-CCNC: 69 U/L (ref 40–120)
ALT SERPL-CCNC: 14 U/L (ref 5–40)
ANION GAP SERPL CALCULATED.3IONS-SCNC: 14 MMOL/L (ref 3–15)
AST SERPL-CCNC: 23 U/L (ref 10–37)
BASOPHILS # BLD: 0 % (ref 0–2)
BASOPHILS ABSOLUTE: 0 K/UL (ref 0–0.2)
BILIRUB SERPL-MCNC: 0.3 MG/DL (ref 0.2–1.2)
BILIRUBIN DIRECT: <0.2 MG/DL (ref 0–0.3)
BUN BLDV-MCNC: 20 MG/DL (ref 6–22)
CALCIUM SERPL-MCNC: 8.9 MG/DL (ref 8.4–10.5)
CHLORIDE BLD-SCNC: 102 MMOL/L (ref 98–110)
CO2: 23 MMOL/L (ref 20–32)
CREAT SERPL-MCNC: 1.1 MG/DL (ref 0.8–1.4)
EOSINOPHIL # BLD: 1 % (ref 0–6)
EOSINOPHILS ABSOLUTE: 0.1 K/UL (ref 0–0.5)
GFR, ESTIMATED: 51.4 ML/MIN/1.73 SQ.M.
GLOBULIN: 2.6 G/DL (ref 2–4)
GLUCOSE: 116 MG/DL (ref 70–99)
HCT VFR BLD CALC: 40 % (ref 35.1–48.3)
HEMOGLOBIN: 12.9 G/DL (ref 11.7–16.1)
LYMPHOCYTES # BLD: 47 % (ref 20–45)
LYMPHOCYTES ABSOLUTE: 2.1 K/UL (ref 1–4.8)
MAGNESIUM: 2.2 MG/DL (ref 1.6–2.5)
MCH RBC QN AUTO: 29 PG (ref 26–34)
MCHC RBC AUTO-ENTMCNC: 32 G/DL (ref 31–36)
MCV RBC AUTO: 90 FL (ref 80–99)
MONOCYTES ABSOLUTE: 0.4 K/UL (ref 0.1–1)
MONOCYTES: 8 % (ref 3–12)
NEUTROPHILS ABSOLUTE: 1.9 K/UL (ref 1.8–7.7)
NEUTROPHILS SEGMENTED: 44 % (ref 40–75)
PDW BLD-RTO: 12.9 % (ref 10–15.5)
PLATELET # BLD: 193 K/UL (ref 140–440)
PMV BLD AUTO: 9.7 FL (ref 9–13)
POTASSIUM SERPL-SCNC: 4.4 MMOL/L (ref 3.5–5.5)
RBC # BLD: 4.46 M/UL (ref 3.8–5.2)
SODIUM BLD-SCNC: 139 MMOL/L (ref 133–145)
TOTAL PROTEIN: 6.4 G/DL (ref 6.2–8.1)
WBC # BLD: 4.5 K/UL (ref 4–11)

## 2025-04-16 LAB — SIROLIMUS LEVEL BLOOD: 2.5 NG/ML (ref 3–20)

## 2025-04-21 RX ORDER — SIROLIMUS 1 MG/1
TABLET, FILM COATED ORAL
Qty: 360 TABLET | Refills: 0 | Status: SHIPPED | OUTPATIENT
Start: 2025-04-21 | End: 2025-04-23 | Stop reason: SDUPTHER

## 2025-04-23 ENCOUNTER — OFFICE VISIT (OUTPATIENT)
Age: 70
End: 2025-04-23
Payer: MEDICARE

## 2025-04-23 VITALS
SYSTOLIC BLOOD PRESSURE: 120 MMHG | WEIGHT: 161 LBS | DIASTOLIC BLOOD PRESSURE: 49 MMHG | OXYGEN SATURATION: 98 % | HEIGHT: 66 IN | HEART RATE: 85 BPM | BODY MASS INDEX: 25.88 KG/M2 | RESPIRATION RATE: 20 BRPM

## 2025-04-23 DIAGNOSIS — Z94.4 LIVER TRANSPLANT STATUS (HCC): Primary | ICD-10-CM

## 2025-04-23 PROCEDURE — 91200 LIVER ELASTOGRAPHY: CPT | Performed by: NURSE PRACTITIONER

## 2025-04-23 PROCEDURE — G8417 CALC BMI ABV UP PARAM F/U: HCPCS | Performed by: NURSE PRACTITIONER

## 2025-04-23 PROCEDURE — 1126F AMNT PAIN NOTED NONE PRSNT: CPT | Performed by: NURSE PRACTITIONER

## 2025-04-23 PROCEDURE — 1159F MED LIST DOCD IN RCRD: CPT | Performed by: NURSE PRACTITIONER

## 2025-04-23 PROCEDURE — G8427 DOCREV CUR MEDS BY ELIG CLIN: HCPCS | Performed by: NURSE PRACTITIONER

## 2025-04-23 PROCEDURE — 99214 OFFICE O/P EST MOD 30 MIN: CPT | Performed by: NURSE PRACTITIONER

## 2025-04-23 PROCEDURE — 1090F PRES/ABSN URINE INCON ASSESS: CPT | Performed by: NURSE PRACTITIONER

## 2025-04-23 PROCEDURE — G8400 PT W/DXA NO RESULTS DOC: HCPCS | Performed by: NURSE PRACTITIONER

## 2025-04-23 PROCEDURE — 1160F RVW MEDS BY RX/DR IN RCRD: CPT | Performed by: NURSE PRACTITIONER

## 2025-04-23 PROCEDURE — 1036F TOBACCO NON-USER: CPT | Performed by: NURSE PRACTITIONER

## 2025-04-23 PROCEDURE — 3017F COLORECTAL CA SCREEN DOC REV: CPT | Performed by: NURSE PRACTITIONER

## 2025-04-23 PROCEDURE — 1123F ACP DISCUSS/DSCN MKR DOCD: CPT | Performed by: NURSE PRACTITIONER

## 2025-04-23 RX ORDER — SIROLIMUS 1 MG/1
TABLET, FILM COATED ORAL
Qty: 360 TABLET | Refills: 0 | Status: SHIPPED | OUTPATIENT
Start: 2025-04-23

## 2025-04-23 NOTE — PROGRESS NOTES
[facility]     Jerod Unger MD, FACP, MACG, FAASLD   MD Lia Lucia PA-C April S Ashworth, [facility]   Kassy Spivey, [facility]-   Eloisa Leyva, FNP-C  Arnold Madrigal, FN-C   Kassidy Barber, [facility]   at [facility]   [address]   [place]  [address] [phone]   FAX: [phone]  [facility]   [address]   [place]  [address] [phone]   FAX: [phone]       Patient Care Team:  Jonathan Sharma MD as PCP - General (Family Medicine)  Tyesha Ma MD as Physician (Surgery Physician)  Claude Javier MD as Physician (Obstetrics & Gynecology)  Thomas Sena MD as Physician (Neurosurgery)  SAAF POPE DDS as Physician (Dental General Practice)  TABBY DONOHUE O.D. as Physician (Optometry)  Karthik Martinez MD (Gastroenterology)  Chad Whitney MD (Internal Medicine Physician)  Devang Fong MD (Hematology and Oncology)  Domo Hudson DO (Rheumatology Internal Medicine)  Claude Masters MD (Dermatology Physician)  Jomar Claudio MD (Endocrinology Physician)  Navi Moran DPM (Podiatry)  Essie Parra, ELSA as Nurse Navigator (Hepatology)      Patient Active Problem List   Diagnosis    H/O liver transplant (HCC)    Diabetes mellitus (HCC)    Complication of transplanted liver (HCC)    Breast cancer    H/O shoulder surgery    Long term current use of immunosuppressive drug    Colon polyps    Back pain, lumbosacral       Cherise L Langevin returns to the [facility] for management of liver allograft function, to adjust immune suppression.  Fibroscan assessment was also performed during this office visit.     The active problem list, all pertinent past medical

## 2025-04-29 DIAGNOSIS — Z79.60 LONG TERM CURRENT USE OF IMMUNOSUPPRESSIVE DRUG: ICD-10-CM

## 2025-04-29 DIAGNOSIS — E11.319 TYPE 2 DIABETES MELLITUS WITH RETINOPATHY WITHOUT MACULAR EDEMA, UNSPECIFIED LATERALITY, UNSPECIFIED RETINOPATHY SEVERITY, UNSPECIFIED WHETHER LONG TERM INSULIN USE (HCC): Primary | ICD-10-CM

## 2025-04-29 DIAGNOSIS — Z94.4 H/O LIVER TRANSPLANT (HCC): ICD-10-CM

## 2025-04-29 DIAGNOSIS — Z94.4 H/O LIVER TRANSPLANT (HCC): Primary | ICD-10-CM

## 2025-04-29 RX ORDER — SIROLIMUS 1 MG/1
4 TABLET, FILM COATED ORAL DAILY
Qty: 360 TABLET | Refills: 3 | Status: SHIPPED | OUTPATIENT
Start: 2025-04-29 | End: 2026-04-29

## 2025-04-29 RX ORDER — FUROSEMIDE 40 MG/1
40 TABLET ORAL 2 TIMES DAILY
Qty: 180 TABLET | Refills: 1 | Status: SHIPPED | OUTPATIENT
Start: 2025-04-29

## 2025-04-30 DIAGNOSIS — Z79.60 LONG TERM CURRENT USE OF IMMUNOSUPPRESSIVE DRUG: ICD-10-CM

## 2025-04-30 DIAGNOSIS — Z94.4 H/O LIVER TRANSPLANT (HCC): ICD-10-CM

## 2025-04-30 RX ORDER — MYCOPHENOLATE MOFETIL 500 MG/1
1000 TABLET ORAL 2 TIMES DAILY
Qty: 360 TABLET | Refills: 3 | Status: SHIPPED | OUTPATIENT
Start: 2025-04-30 | End: 2026-04-30

## 2025-05-13 LAB
BASOPHILS # BLD: 0 % (ref 0–2)
BASOPHILS ABSOLUTE: 0 K/UL (ref 0–0.2)
EOSINOPHIL # BLD: 1 % (ref 0–6)
EOSINOPHILS ABSOLUTE: 0 K/UL (ref 0–0.5)
HCT VFR BLD CALC: 41.6 % (ref 35.1–48.3)
HEMOGLOBIN: 13.4 G/DL (ref 11.7–16.1)
LYMPHOCYTES # BLD: 43 % (ref 20–45)
LYMPHOCYTES ABSOLUTE: 2 K/UL (ref 1–4.8)
MCH RBC QN AUTO: 29 PG (ref 26–34)
MCHC RBC AUTO-ENTMCNC: 32 G/DL (ref 31–36)
MCV RBC AUTO: 89 FL (ref 80–99)
MONOCYTES ABSOLUTE: 0.4 K/UL (ref 0.1–1)
MONOCYTES: 7 % (ref 3–12)
NEUTROPHILS ABSOLUTE: 2.3 K/UL (ref 1.8–7.7)
NEUTROPHILS SEGMENTED: 49 % (ref 40–75)
PDW BLD-RTO: 12.6 % (ref 10–15.5)
PLATELET # BLD: 223 K/UL (ref 140–440)
PMV BLD AUTO: 10.3 FL (ref 9–13)
RBC # BLD: 4.7 M/UL (ref 3.8–5.2)
WBC # BLD: 4.8 K/UL (ref 4–11)

## 2025-05-14 LAB
A/G RATIO: 1.3 RATIO (ref 1.1–2.6)
ALBUMIN: 3.9 G/DL (ref 3.5–5)
ALP BLD-CCNC: 75 U/L (ref 40–120)
ALT SERPL-CCNC: 16 U/L (ref 5–40)
ANION GAP SERPL CALCULATED.3IONS-SCNC: 17 MMOL/L (ref 3–15)
AST SERPL-CCNC: 22 U/L (ref 10–37)
BILIRUB SERPL-MCNC: 0.3 MG/DL (ref 0.2–1.2)
BILIRUBIN DIRECT: <0.2 MG/DL (ref 0–0.3)
BUN BLDV-MCNC: 22 MG/DL (ref 6–22)
CALCIUM SERPL-MCNC: 9.4 MG/DL (ref 8.4–10.5)
CHLORIDE BLD-SCNC: 100 MMOL/L (ref 98–110)
CO2: 24 MMOL/L (ref 20–32)
CREAT SERPL-MCNC: 1.1 MG/DL (ref 0.8–1.4)
GFR, ESTIMATED: 51.4 ML/MIN/1.73 SQ.M.
GLOBULIN: 2.9 G/DL (ref 2–4)
GLUCOSE: 111 MG/DL (ref 70–99)
MAGNESIUM: 2.4 MG/DL (ref 1.6–2.5)
POTASSIUM SERPL-SCNC: 4.3 MMOL/L (ref 3.5–5.5)
SIROLIMUS LEVEL BLOOD: 3.4 NG/ML (ref 3–20)
SODIUM BLD-SCNC: 141 MMOL/L (ref 133–145)
TOTAL PROTEIN: 6.8 G/DL (ref 6.2–8.1)

## 2025-06-12 LAB
BASOPHILS # BLD: 0 % (ref 0–2)
BASOPHILS ABSOLUTE: 0 K/UL (ref 0–0.2)
EOSINOPHIL # BLD: 1 % (ref 0–6)
EOSINOPHILS ABSOLUTE: 0 K/UL (ref 0–0.5)
HCT VFR BLD CALC: 40.2 % (ref 35.1–48.3)
HEMOGLOBIN: 13.1 G/DL (ref 11.7–16.1)
LYMPHOCYTES # BLD: 51 % (ref 20–45)
LYMPHOCYTES ABSOLUTE: 2.2 K/UL (ref 1–4.8)
MCH RBC QN AUTO: 29 PG (ref 26–34)
MCHC RBC AUTO-ENTMCNC: 33 G/DL (ref 31–36)
MCV RBC AUTO: 90 FL (ref 80–99)
MONOCYTES ABSOLUTE: 0.4 K/UL (ref 0.1–1)
MONOCYTES: 8 % (ref 3–12)
NEUTROPHILS ABSOLUTE: 1.7 K/UL (ref 1.8–7.7)
NEUTROPHILS SEGMENTED: 40 % (ref 40–75)
PDW BLD-RTO: 12.8 % (ref 10–15.5)
PLATELET # BLD: 212 K/UL (ref 140–440)
PMV BLD AUTO: 10.7 FL (ref 9–13)
RBC # BLD: 4.46 M/UL (ref 3.8–5.2)
WBC # BLD: 4.2 K/UL (ref 4–11)

## 2025-06-13 LAB
A/G RATIO: 1.5 RATIO (ref 1.1–2.6)
ALBUMIN: 3.8 G/DL (ref 3.5–5)
ALP BLD-CCNC: 66 U/L (ref 40–120)
ALT SERPL-CCNC: 13 U/L (ref 5–40)
ANION GAP SERPL CALCULATED.3IONS-SCNC: 10 MMOL/L (ref 3–15)
AST SERPL-CCNC: 20 U/L (ref 10–37)
BILIRUB SERPL-MCNC: 0.2 MG/DL (ref 0.2–1.2)
BILIRUBIN DIRECT: <0.2 MG/DL (ref 0–0.3)
BUN BLDV-MCNC: 16 MG/DL (ref 6–22)
CALCIUM SERPL-MCNC: 9 MG/DL (ref 8.4–10.5)
CHLORIDE BLD-SCNC: 104 MMOL/L (ref 98–110)
CO2: 23 MMOL/L (ref 20–32)
CREAT SERPL-MCNC: 1.2 MG/DL (ref 0.8–1.4)
GFR, ESTIMATED: 46.6 ML/MIN/1.73 SQ.M.
GLOBULIN: 2.5 G/DL (ref 2–4)
GLUCOSE: 94 MG/DL (ref 70–99)
MAGNESIUM: 2.4 MG/DL (ref 1.6–2.5)
POTASSIUM SERPL-SCNC: 4.5 MMOL/L (ref 3.5–5.5)
SODIUM BLD-SCNC: 137 MMOL/L (ref 133–145)
TOTAL PROTEIN: 6.3 G/DL (ref 6.2–8.1)

## 2025-06-16 LAB — SIROLIMUS LEVEL BLOOD: 4.5 NG/ML (ref 3–20)

## 2025-06-17 DIAGNOSIS — Z79.60 LONG TERM CURRENT USE OF IMMUNOSUPPRESSIVE DRUG: ICD-10-CM

## 2025-06-17 DIAGNOSIS — Z94.4 H/O LIVER TRANSPLANT (HCC): Primary | ICD-10-CM

## 2025-07-02 DIAGNOSIS — Z94.4 H/O LIVER TRANSPLANT (HCC): Primary | ICD-10-CM

## 2025-07-02 DIAGNOSIS — Z79.60 LONG TERM CURRENT USE OF IMMUNOSUPPRESSIVE DRUG: ICD-10-CM

## 2025-07-16 LAB
A/G RATIO: 1.2 RATIO (ref 1.1–2.6)
ALBUMIN: 3.8 G/DL (ref 3.5–5)
ALP BLD-CCNC: 76 U/L (ref 40–120)
ALT SERPL-CCNC: 15 U/L (ref 5–40)
ANION GAP SERPL CALCULATED.3IONS-SCNC: 10 MMOL/L (ref 3–15)
AST SERPL-CCNC: 21 U/L (ref 10–37)
BASOPHILS # BLD: 0 % (ref 0–2)
BASOPHILS ABSOLUTE: 0 K/UL (ref 0–0.2)
BILIRUB SERPL-MCNC: 0.4 MG/DL (ref 0.2–1.2)
BILIRUBIN DIRECT: <0.2 MG/DL (ref 0–0.3)
BUN BLDV-MCNC: 16 MG/DL (ref 6–22)
CALCIUM SERPL-MCNC: 9.2 MG/DL (ref 8.4–10.5)
CHLORIDE BLD-SCNC: 105 MMOL/L (ref 98–110)
CO2: 23 MMOL/L (ref 20–32)
CREAT SERPL-MCNC: 1 MG/DL (ref 0.8–1.4)
EOSINOPHIL # BLD: 0 % (ref 0–6)
EOSINOPHILS ABSOLUTE: 0 K/UL (ref 0–0.5)
GFR, ESTIMATED: 57.2 ML/MIN/1.73 SQ.M.
GLOBULIN: 3.2 G/DL (ref 2–4)
GLUCOSE: 97 MG/DL (ref 70–99)
HCT VFR BLD CALC: 39.8 % (ref 35.1–48.3)
HEMOGLOBIN: 13 G/DL (ref 11.7–16.1)
LYMPHOCYTES # BLD: 41 % (ref 20–45)
LYMPHOCYTES ABSOLUTE: 2 K/UL (ref 1–4.8)
MAGNESIUM: 2.3 MG/DL (ref 1.6–2.5)
MCH RBC QN AUTO: 29 PG (ref 26–34)
MCHC RBC AUTO-ENTMCNC: 33 G/DL (ref 31–36)
MCV RBC AUTO: 88 FL (ref 80–99)
MONOCYTES ABSOLUTE: 0.3 K/UL (ref 0.1–1)
MONOCYTES: 7 % (ref 3–12)
NEUTROPHILS ABSOLUTE: 2.5 K/UL (ref 1.8–7.7)
NEUTROPHILS SEGMENTED: 52 % (ref 40–75)
PDW BLD-RTO: 12.8 % (ref 10–15.5)
PLATELET # BLD: 204 K/UL (ref 140–440)
PMV BLD AUTO: 9.6 FL (ref 9–13)
POTASSIUM SERPL-SCNC: 4.6 MMOL/L (ref 3.5–5.5)
RBC # BLD: 4.53 M/UL (ref 3.8–5.2)
SIROLIMUS LEVEL BLOOD: 4.3 NG/ML (ref 3–20)
SODIUM BLD-SCNC: 138 MMOL/L (ref 133–145)
TOTAL PROTEIN: 7 G/DL (ref 6.2–8.1)
WBC # BLD: 4.9 K/UL (ref 4–11)

## 2025-07-18 ENCOUNTER — TELEPHONE (OUTPATIENT)
Age: 70
End: 2025-07-18

## 2025-07-18 NOTE — TELEPHONE ENCOUNTER
Called patient to review lab results from 7/17/25. Informed patient all lab results are within normal limits. Patient verbally confirmed understanding.

## (undated) DEVICE — INTENDED FOR TISSUE SEPARATION, AND OTHER PROCEDURES THAT REQUIRE A SHARP SURGICAL BLADE TO PUNCTURE OR CUT.: Brand: BARD-PARKER ®  SAFETY SCALPED

## (undated) DEVICE — PAD NON-ADHERENT 3X4 STRL LF --

## (undated) DEVICE — MAJ-1414 SINGLE USE ADPATER BIOPSY VALV: Brand: SINGLE USE ADAPTOR BIOPSY VALVE

## (undated) DEVICE — (D)SYR 10ML 1/5ML GRAD NSAF -- PKGING CHANGE USE ITEM 338027

## (undated) DEVICE — SPONGE GZ W4XL4IN COT 12 PLY TYP VII WVN C FLD DSGN

## (undated) DEVICE — TRAY PREP DRY W/ PREM GLV 2 APPL 6 SPNG 2 UNDPD 1 OVERWRAP

## (undated) DEVICE — DRAPE,APERTURE,MINOR PROCEDURE: Brand: MEDLINE

## (undated) DEVICE — MAX-CORE® DISPOSABLE CORE BIOPSY INSTRUMENT, 16G X 16CM: Brand: MAX-CORE

## (undated) DEVICE — SOL IRRIGATION INJ NACL 0.9% 500ML BTL

## (undated) DEVICE — (D)BNDG ADHESIVE FABRIC 3/4X3 -- DISC BY MFR USE ITEM 357960

## (undated) DEVICE — SKIN MARKER,REGULAR TIP WITH RULER AND LABELS: Brand: DEVON

## (undated) DEVICE — HYPODERMIC SAFETY NEEDLE: Brand: MAGELLAN

## (undated) DEVICE — KENDALL RADIOLUCENT FOAM MONITORING ELECTRODE RECTANGULAR SHAPE: Brand: KENDALL

## (undated) DEVICE — MAYO STAND COVER: Brand: CONVERTORS